# Patient Record
Sex: MALE | Race: WHITE | NOT HISPANIC OR LATINO | Employment: FULL TIME | URBAN - METROPOLITAN AREA
[De-identification: names, ages, dates, MRNs, and addresses within clinical notes are randomized per-mention and may not be internally consistent; named-entity substitution may affect disease eponyms.]

---

## 2017-09-19 ENCOUNTER — ALLSCRIPTS OFFICE VISIT (OUTPATIENT)
Dept: OTHER | Facility: OTHER | Age: 42
End: 2017-09-19

## 2017-09-19 ENCOUNTER — APPOINTMENT (OUTPATIENT)
Dept: LAB | Facility: CLINIC | Age: 42
End: 2017-09-19
Payer: COMMERCIAL

## 2017-09-19 ENCOUNTER — TRANSCRIBE ORDERS (OUTPATIENT)
Dept: LAB | Facility: CLINIC | Age: 42
End: 2017-09-19

## 2017-09-19 DIAGNOSIS — H53.9 VISUAL DISTURBANCE: ICD-10-CM

## 2017-09-19 DIAGNOSIS — Z00.00 ENCOUNTER FOR GENERAL ADULT MEDICAL EXAMINATION WITHOUT ABNORMAL FINDINGS: ICD-10-CM

## 2017-09-19 DIAGNOSIS — E66.01 MORBID (SEVERE) OBESITY DUE TO EXCESS CALORIES (HCC): ICD-10-CM

## 2017-09-19 DIAGNOSIS — R63.5 ABNORMAL WEIGHT GAIN: ICD-10-CM

## 2017-09-19 DIAGNOSIS — R74.8 ABNORMAL LEVELS OF OTHER SERUM ENZYMES: ICD-10-CM

## 2017-09-19 DIAGNOSIS — R73.9 HYPERGLYCEMIA: ICD-10-CM

## 2017-09-19 DIAGNOSIS — Z98.84 BARIATRIC SURGERY STATUS: ICD-10-CM

## 2017-09-19 DIAGNOSIS — Z13.220 ENCOUNTER FOR SCREENING FOR LIPOID DISORDERS: ICD-10-CM

## 2017-09-19 LAB
ALBUMIN SERPL BCP-MCNC: 3.8 G/DL (ref 3.5–5)
ALP SERPL-CCNC: 138 U/L (ref 46–116)
ALT SERPL W P-5'-P-CCNC: 33 U/L (ref 12–78)
ANION GAP SERPL CALCULATED.3IONS-SCNC: 7 MMOL/L (ref 4–13)
AST SERPL W P-5'-P-CCNC: 25 U/L (ref 5–45)
BILIRUB SERPL-MCNC: 0.7 MG/DL (ref 0.2–1)
BILIRUB UR QL STRIP: NEGATIVE
BUN SERPL-MCNC: 17 MG/DL (ref 5–25)
CALCIUM SERPL-MCNC: 8.9 MG/DL (ref 8.3–10.1)
CHLORIDE SERPL-SCNC: 107 MMOL/L (ref 100–108)
CHOLEST SERPL-MCNC: 144 MG/DL (ref 50–200)
CLARITY UR: CLEAR
CO2 SERPL-SCNC: 27 MMOL/L (ref 21–32)
COLOR UR: YELLOW
CREAT SERPL-MCNC: 0.88 MG/DL (ref 0.6–1.3)
CREAT UR-MCNC: 206 MG/DL
ERYTHROCYTE [DISTWIDTH] IN BLOOD BY AUTOMATED COUNT: 13.3 % (ref 11.6–15.1)
EST. AVERAGE GLUCOSE BLD GHB EST-MCNC: 137 MG/DL
GFR SERPL CREATININE-BSD FRML MDRD: 105 ML/MIN/1.73SQ M
GLUCOSE P FAST SERPL-MCNC: 131 MG/DL (ref 65–99)
GLUCOSE UR STRIP-MCNC: NEGATIVE MG/DL
HBA1C MFR BLD: 6.4 % (ref 4.2–6.3)
HCT VFR BLD AUTO: 46.2 % (ref 36.5–49.3)
HDLC SERPL-MCNC: 46 MG/DL (ref 40–60)
HGB BLD-MCNC: 15.4 G/DL (ref 12–17)
HGB UR QL STRIP.AUTO: NEGATIVE
KETONES UR STRIP-MCNC: NEGATIVE MG/DL
LDLC SERPL CALC-MCNC: 80 MG/DL (ref 0–100)
LEUKOCYTE ESTERASE UR QL STRIP: NEGATIVE
MCH RBC QN AUTO: 30 PG (ref 26.8–34.3)
MCHC RBC AUTO-ENTMCNC: 33.3 G/DL (ref 31.4–37.4)
MCV RBC AUTO: 90 FL (ref 82–98)
MICROALBUMIN UR-MCNC: 6.4 MG/L (ref 0–20)
MICROALBUMIN/CREAT 24H UR: 3 MG/G CREATININE (ref 0–30)
NITRITE UR QL STRIP: NEGATIVE
PH UR STRIP.AUTO: 6 [PH] (ref 4.5–8)
PLATELET # BLD AUTO: 250 THOUSANDS/UL (ref 149–390)
PMV BLD AUTO: 11.2 FL (ref 8.9–12.7)
POTASSIUM SERPL-SCNC: 4.3 MMOL/L (ref 3.5–5.3)
PROT SERPL-MCNC: 7 G/DL (ref 6.4–8.2)
PROT UR STRIP-MCNC: NEGATIVE MG/DL
RBC # BLD AUTO: 5.14 MILLION/UL (ref 3.88–5.62)
SODIUM SERPL-SCNC: 141 MMOL/L (ref 136–145)
SP GR UR STRIP.AUTO: 1.03 (ref 1–1.03)
TRIGL SERPL-MCNC: 91 MG/DL
TSH SERPL DL<=0.05 MIU/L-ACNC: 3.07 UIU/ML (ref 0.36–3.74)
UROBILINOGEN UR QL STRIP.AUTO: 1 E.U./DL
WBC # BLD AUTO: 6.81 THOUSAND/UL (ref 4.31–10.16)

## 2017-09-19 PROCEDURE — 36415 COLL VENOUS BLD VENIPUNCTURE: CPT

## 2017-09-19 PROCEDURE — 82043 UR ALBUMIN QUANTITATIVE: CPT

## 2017-09-19 PROCEDURE — 80053 COMPREHEN METABOLIC PANEL: CPT

## 2017-09-19 PROCEDURE — 81003 URINALYSIS AUTO W/O SCOPE: CPT

## 2017-09-19 PROCEDURE — 83036 HEMOGLOBIN GLYCOSYLATED A1C: CPT

## 2017-09-19 PROCEDURE — 85027 COMPLETE CBC AUTOMATED: CPT

## 2017-09-19 PROCEDURE — 80061 LIPID PANEL: CPT

## 2017-09-19 PROCEDURE — 82570 ASSAY OF URINE CREATININE: CPT

## 2017-09-19 PROCEDURE — 84443 ASSAY THYROID STIM HORMONE: CPT

## 2017-09-27 ENCOUNTER — GENERIC CONVERSION - ENCOUNTER (OUTPATIENT)
Dept: OTHER | Facility: OTHER | Age: 42
End: 2017-09-27

## 2017-09-28 ENCOUNTER — APPOINTMENT (OUTPATIENT)
Dept: LAB | Facility: CLINIC | Age: 42
End: 2017-09-28
Payer: COMMERCIAL

## 2017-09-28 PROCEDURE — 84075 ASSAY ALKALINE PHOSPHATASE: CPT

## 2017-09-28 PROCEDURE — 84080 ASSAY ALKALINE PHOSPHATASES: CPT

## 2017-09-28 PROCEDURE — 36415 COLL VENOUS BLD VENIPUNCTURE: CPT

## 2017-10-02 LAB
ALP BONE CFR SERPL: 53 % (ref 12–68)
ALP INTEST CFR SERPL: 3 % (ref 0–18)
ALP LIVER CFR SERPL: 44 % (ref 13–88)
ALP SERPL-CCNC: 144 IU/L (ref 39–117)

## 2018-01-14 VITALS
BODY MASS INDEX: 35.91 KG/M2 | HEIGHT: 71 IN | WEIGHT: 256.5 LBS | SYSTOLIC BLOOD PRESSURE: 110 MMHG | HEART RATE: 56 BPM | DIASTOLIC BLOOD PRESSURE: 72 MMHG

## 2018-01-22 VITALS
BODY MASS INDEX: 36.02 KG/M2 | WEIGHT: 258.25 LBS | DIASTOLIC BLOOD PRESSURE: 82 MMHG | HEART RATE: 60 BPM | SYSTOLIC BLOOD PRESSURE: 112 MMHG

## 2018-03-27 DIAGNOSIS — E66.01 MORBID (SEVERE) OBESITY DUE TO EXCESS CALORIES (HCC): ICD-10-CM

## 2018-03-27 DIAGNOSIS — R63.5 ABNORMAL WEIGHT GAIN: ICD-10-CM

## 2018-03-27 DIAGNOSIS — R73.9 HYPERGLYCEMIA: ICD-10-CM

## 2018-03-27 DIAGNOSIS — H60.90 OTITIS EXTERNA: ICD-10-CM

## 2018-03-27 DIAGNOSIS — H61.21 IMPACTED CERUMEN OF RIGHT EAR: ICD-10-CM

## 2018-03-27 DIAGNOSIS — Z98.84 BARIATRIC SURGERY STATUS: ICD-10-CM

## 2018-03-27 DIAGNOSIS — R74.8 ABNORMAL LEVELS OF OTHER SERUM ENZYMES: ICD-10-CM

## 2018-04-02 PROBLEM — R63.5 WEIGHT GAIN FOLLOWING GASTRIC BYPASS SURGERY: Status: ACTIVE | Noted: 2017-09-19

## 2018-04-02 PROBLEM — R74.8 ALKALINE PHOSPHATASE ELEVATION: Status: ACTIVE | Noted: 2017-09-27

## 2018-04-02 PROBLEM — E66.01 SEVERE OBESITY (BMI 35.0-39.9): Status: ACTIVE | Noted: 2017-09-19

## 2018-04-02 PROBLEM — Z98.84 WEIGHT GAIN FOLLOWING GASTRIC BYPASS SURGERY: Status: ACTIVE | Noted: 2017-09-19

## 2018-04-02 PROBLEM — R73.9 HYPERGLYCEMIA: Status: ACTIVE | Noted: 2017-09-19

## 2018-04-02 PROBLEM — H53.9 VISION CHANGES: Status: ACTIVE | Noted: 2017-09-19

## 2019-01-04 ENCOUNTER — OFFICE VISIT (OUTPATIENT)
Dept: FAMILY MEDICINE CLINIC | Facility: CLINIC | Age: 44
End: 2019-01-04
Payer: COMMERCIAL

## 2019-01-04 VITALS
DIASTOLIC BLOOD PRESSURE: 80 MMHG | SYSTOLIC BLOOD PRESSURE: 116 MMHG | HEART RATE: 60 BPM | BODY MASS INDEX: 35.56 KG/M2 | TEMPERATURE: 98.1 F | WEIGHT: 254 LBS | HEIGHT: 71 IN

## 2019-01-04 DIAGNOSIS — J01.00 ACUTE NON-RECURRENT MAXILLARY SINUSITIS: Primary | ICD-10-CM

## 2019-01-04 PROCEDURE — 1036F TOBACCO NON-USER: CPT | Performed by: PHYSICIAN ASSISTANT

## 2019-01-04 PROCEDURE — 99214 OFFICE O/P EST MOD 30 MIN: CPT | Performed by: PHYSICIAN ASSISTANT

## 2019-01-04 PROCEDURE — 3008F BODY MASS INDEX DOCD: CPT | Performed by: PHYSICIAN ASSISTANT

## 2019-01-04 RX ORDER — AZITHROMYCIN 250 MG/1
TABLET, FILM COATED ORAL
Qty: 6 TABLET | Refills: 0 | Status: SHIPPED | OUTPATIENT
Start: 2019-01-04 | End: 2019-01-09

## 2019-01-04 NOTE — PROGRESS NOTES
Assessment/Plan:  Patient Instructions   1  Acute sinusitis-recommended Zithromax Z-Felipe as directed  The patient was also advised to use Flonase, 2 sprays in each nostril daily  They may use over-the-counter NSAIDs such as ibuprofen as necessary for pressure  Follow-up in the next 4-5 days if not improved  Diagnoses and all orders for this visit:    Acute non-recurrent maxillary sinusitis  -     azithromycin (ZITHROMAX) 250 mg tablet; Take 2 Tabs by mouth today, then Take 1 tablet daily for 4 more days  Subjective:   Fever, sore throat, productive cough clear / yellow, body aches with cough  Symptoms started Monday  -  ls     Patient ID: Candelaria Bang is a 38 y o  male  HPI:  This is a 76-year-old gentleman that presents to the office with ongoing cough and sinus congestion  He has had a lot of head pressure and postnasal drip  He has had some yellow mucus production  He is feeling a bit fatigued and run down  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  He has been having some fevers which are mostly around  degrees F  The following portions of the patient's history were reviewed and updated as appropriate: allergies, current medications, past family history, past medical history, past social history, past surgical history and problem list     Review of Systems   Constitutional: Positive for activity change and fatigue  Negative for fever  HENT: Positive for congestion, postnasal drip, rhinorrhea, sinus pressure and sore throat  Negative for ear pain  Respiratory: Positive for cough  Negative for chest tightness, shortness of breath and wheezing  Cardiovascular: Negative for palpitations  Gastrointestinal: Negative for diarrhea and nausea  Musculoskeletal: Positive for myalgias  Negative for arthralgias  Skin: Negative for rash  Neurological: Negative for dizziness and numbness  All other systems reviewed and are negative  Objective:      /80 (BP Location: Left arm, Patient Position: Sitting, Cuff Size: Large)   Pulse 60   Temp 98 1 °F (36 7 °C) (Oral)   Ht 5' 11" (1 803 m)   Wt 115 kg (254 lb)   BMI 35 43 kg/m²          Physical Exam   Constitutional: He is oriented to person, place, and time  He appears well-developed and well-nourished  No distress  HENT:   Head: Normocephalic and atraumatic  Mouth/Throat: No oropharyngeal exudate  Turbinates are erythematous and edematous bilaterally  Post nasal drip of the posterior pharynx noted  Eyes: Pupils are equal, round, and reactive to light  Right eye exhibits no discharge  Left eye exhibits no discharge  Neck: Neck supple  Cardiovascular: Normal rate, regular rhythm and normal heart sounds  Exam reveals no friction rub  No murmur heard  Pulmonary/Chest: Effort normal and breath sounds normal  No respiratory distress  He has no wheezes  He has no rales  Musculoskeletal: Normal range of motion  He exhibits no edema  Lymphadenopathy:     He has cervical adenopathy  Neurological: He is alert and oriented to person, place, and time  Skin: Skin is warm and dry  No erythema  Psychiatric: He has a normal mood and affect  His behavior is normal    Nursing note and vitals reviewed

## 2019-01-04 NOTE — PATIENT INSTRUCTIONS
1   Acute sinusitis-recommended Zithromax Z-Felipe as directed  The patient was also advised to use Flonase, 2 sprays in each nostril daily  They may use over-the-counter NSAIDs such as ibuprofen as necessary for pressure  Follow-up in the next 4-5 days if not improved

## 2019-07-11 ENCOUNTER — OFFICE VISIT (OUTPATIENT)
Dept: FAMILY MEDICINE CLINIC | Facility: CLINIC | Age: 44
End: 2019-07-11
Payer: COMMERCIAL

## 2019-07-11 VITALS
HEIGHT: 72 IN | SYSTOLIC BLOOD PRESSURE: 102 MMHG | HEART RATE: 56 BPM | WEIGHT: 262 LBS | DIASTOLIC BLOOD PRESSURE: 64 MMHG | BODY MASS INDEX: 35.49 KG/M2

## 2019-07-11 DIAGNOSIS — E66.01 SEVERE OBESITY WITH BODY MASS INDEX (BMI) OF 35.0 TO 39.9 WITH SERIOUS COMORBIDITY (HCC): ICD-10-CM

## 2019-07-11 DIAGNOSIS — S91.332A PUNCTURE WOUND OF LEFT FOOT, INITIAL ENCOUNTER: Primary | ICD-10-CM

## 2019-07-11 DIAGNOSIS — R63.5 WEIGHT GAIN FOLLOWING GASTRIC BYPASS SURGERY: ICD-10-CM

## 2019-07-11 DIAGNOSIS — Z13.220 SCREENING, LIPID: ICD-10-CM

## 2019-07-11 DIAGNOSIS — R73.9 HYPERGLYCEMIA: ICD-10-CM

## 2019-07-11 DIAGNOSIS — R10.9 ABDOMINAL PAIN, UNSPECIFIED ABDOMINAL LOCATION: ICD-10-CM

## 2019-07-11 DIAGNOSIS — K80.20 ASYMPTOMATIC CHOLELITHIASIS: ICD-10-CM

## 2019-07-11 DIAGNOSIS — R74.8 ALKALINE PHOSPHATASE ELEVATION: ICD-10-CM

## 2019-07-11 DIAGNOSIS — Z98.84 WEIGHT GAIN FOLLOWING GASTRIC BYPASS SURGERY: ICD-10-CM

## 2019-07-11 PROCEDURE — 90715 TDAP VACCINE 7 YRS/> IM: CPT

## 2019-07-11 PROCEDURE — 90471 IMMUNIZATION ADMIN: CPT

## 2019-07-11 PROCEDURE — 99214 OFFICE O/P EST MOD 30 MIN: CPT | Performed by: FAMILY MEDICINE

## 2019-07-11 NOTE — PROGRESS NOTES
Assessment and Plan:   1  Abdominal pain /  Asymptomatic cholelithiasis in past   Check blood work and ultrasound  2  Hyperglycemia borderline diabetic blood work is ordered  3  Alkaline phosphatase elevation blood work is ordered  4  Weight gain status post gastric bypass /very obesity diet exercise and weight loss recommended BMI of 35 53 discussed   5  Puncture wound left foot healing well  Signs of infection discussed  Adacel given today  6  Return in 2 weeks sooner if needed      Problem List Items Addressed This Visit        Digestive    Asymptomatic cholelithiasis      Blood work and ultrasound are ordered            Other    Alkaline phosphatase elevation    Relevant Orders    CBC    Comprehensive metabolic panel    Hemoglobin A1C    Lipid Panel with Direct LDL reflex    Microalbumin / creatinine urine ratio    Urinalysis with reflex to microscopic    Alkaline phosphatase, isoenzymes    Amylase    Lipase    Hyperglycemia    Relevant Orders    CBC    Comprehensive metabolic panel    Hemoglobin A1C    Lipid Panel with Direct LDL reflex    Microalbumin / creatinine urine ratio    Urinalysis with reflex to microscopic    Alkaline phosphatase, isoenzymes    Amylase    Lipase    Severe obesity (BMI 35 0-39  9)    Relevant Orders    CBC    Comprehensive metabolic panel    Hemoglobin A1C    Lipid Panel with Direct LDL reflex    Microalbumin / creatinine urine ratio    Urinalysis with reflex to microscopic    Alkaline phosphatase, isoenzymes    Amylase    Lipase    Weight gain following gastric bypass surgery    Relevant Orders    CBC    Comprehensive metabolic panel    Hemoglobin A1C    Lipid Panel with Direct LDL reflex    Microalbumin / creatinine urine ratio    Urinalysis with reflex to microscopic    Alkaline phosphatase, isoenzymes    Amylase    Lipase    Abdominal pain      Blood work and ultrasound ordered         Relevant Orders    CBC    Comprehensive metabolic panel    Hemoglobin A1C    Lipid Panel with Direct LDL reflex    Microalbumin / creatinine urine ratio    Urinalysis with reflex to microscopic    Alkaline phosphatase, isoenzymes    Amylase    Lipase    US abdomen complete      Other Visit Diagnoses     Puncture wound of left foot, initial encounter    -  Primary    Relevant Orders    CBC    Comprehensive metabolic panel    Hemoglobin A1C    Lipid Panel with Direct LDL reflex    Microalbumin / creatinine urine ratio    Urinalysis with reflex to microscopic    Alkaline phosphatase, isoenzymes    Amylase    Lipase    Screening, lipid        Relevant Orders    CBC    Comprehensive metabolic panel    Hemoglobin A1C    Lipid Panel with Direct LDL reflex    Microalbumin / creatinine urine ratio    Urinalysis with reflex to microscopic    Alkaline phosphatase, isoenzymes    Amylase    Lipase                 Diagnoses and all orders for this visit:    Puncture wound of left foot, initial encounter  -     CBC; Future  -     Comprehensive metabolic panel; Future  -     Hemoglobin A1C; Future  -     Lipid Panel with Direct LDL reflex; Future  -     Microalbumin / creatinine urine ratio; Future  -     Urinalysis with reflex to microscopic; Future  -     Alkaline phosphatase, isoenzymes; Future  -     Amylase; Future  -     Lipase; Future    Hyperglycemia  -     CBC; Future  -     Comprehensive metabolic panel; Future  -     Hemoglobin A1C; Future  -     Lipid Panel with Direct LDL reflex; Future  -     Microalbumin / creatinine urine ratio; Future  -     Urinalysis with reflex to microscopic; Future  -     Alkaline phosphatase, isoenzymes; Future  -     Amylase; Future  -     Lipase; Future    Alkaline phosphatase elevation  -     CBC; Future  -     Comprehensive metabolic panel; Future  -     Hemoglobin A1C; Future  -     Lipid Panel with Direct LDL reflex; Future  -     Microalbumin / creatinine urine ratio; Future  -     Urinalysis with reflex to microscopic; Future  -     Alkaline phosphatase, isoenzymes;  Future  - Amylase; Future  -     Lipase; Future    Severe obesity (BMI 35 0-39 9)  -     CBC; Future  -     Comprehensive metabolic panel; Future  -     Hemoglobin A1C; Future  -     Lipid Panel with Direct LDL reflex; Future  -     Microalbumin / creatinine urine ratio; Future  -     Urinalysis with reflex to microscopic; Future  -     Alkaline phosphatase, isoenzymes; Future  -     Amylase; Future  -     Lipase; Future    Weight gain following gastric bypass surgery  -     CBC; Future  -     Comprehensive metabolic panel; Future  -     Hemoglobin A1C; Future  -     Lipid Panel with Direct LDL reflex; Future  -     Microalbumin / creatinine urine ratio; Future  -     Urinalysis with reflex to microscopic; Future  -     Alkaline phosphatase, isoenzymes; Future  -     Amylase; Future  -     Lipase; Future    Screening, lipid  -     CBC; Future  -     Comprehensive metabolic panel; Future  -     Hemoglobin A1C; Future  -     Lipid Panel with Direct LDL reflex; Future  -     Microalbumin / creatinine urine ratio; Future  -     Urinalysis with reflex to microscopic; Future  -     Alkaline phosphatase, isoenzymes; Future  -     Amylase; Future  -     Lipase; Future    Abdominal pain, unspecified abdominal location  -     CBC; Future  -     Comprehensive metabolic panel; Future  -     Hemoglobin A1C; Future  -     Lipid Panel with Direct LDL reflex; Future  -     Microalbumin / creatinine urine ratio; Future  -     Urinalysis with reflex to microscopic; Future  -     Alkaline phosphatase, isoenzymes; Future  -     Amylase; Future  -     Lipase; Future  -     US abdomen complete; Future    Asymptomatic cholelithiasis              Subjective:      Patient ID: Ethan Covarrubias is a 38 y o  male  CC:    Chief Complaint   Patient presents with    Follow-up     patient is here for a yearly check up  ak       HPI:     Patient is here for follow-up  Patient is overdue for blood work  Patient gained 8 lb since last office visit  Yesterday patient found a small piece of glasses of foot he did remove it  Patient is overdue for tetanus immunizations  Patient states by once a week he gets upper abdominal crampy type pain  No nausea vomiting constipation diarrhea no melena change in stools      The following portions of the patient's history were reviewed and updated as appropriate: allergies, current medications, past family history, past medical history, past social history, past surgical history and problem list       Review of Systems   Constitutional: Positive for unexpected weight change  HENT: Negative  Eyes: Negative  Respiratory: Negative  Cardiovascular: Negative  Gastrointestinal: Negative  Endocrine:          Patient's last hemoglobin A1c is 6 4/borderline diabetic   Genitourinary: Negative  Musculoskeletal: Negative  Skin:         HPI   Allergic/Immunologic: Negative  Neurological: Negative  Hematological: Negative  Psychiatric/Behavioral: Negative  Data to review:       Objective:    Vitals:    07/11/19 1141   BP: 102/64   Pulse: 56   Weight: 119 kg (262 lb)   Height: 6' (1 829 m)        Physical Exam   Constitutional: He is oriented to person, place, and time  He appears well-developed and well-nourished  HENT:   Head: Normocephalic and atraumatic  Right Ear: External ear normal    Left Ear: External ear normal    Nose: Nose normal    Mouth/Throat: Oropharynx is clear and moist  No oropharyngeal exudate  Eyes: Pupils are equal, round, and reactive to light  Conjunctivae and EOM are normal  No scleral icterus  Neck: Neck supple  No JVD present  No tracheal deviation present  No thyromegaly present  Cardiovascular: Normal rate, regular rhythm and normal heart sounds  Pulmonary/Chest: Effort normal and breath sounds normal    Abdominal: Soft  Bowel sounds are normal  He exhibits no distension and no mass  There is no tenderness  There is no rebound and no guarding  No hernia  Musculoskeletal: He exhibits no edema  Lymphadenopathy:     He has no cervical adenopathy  Neurological: He is alert and oriented to person, place, and time  No cranial nerve deficit  Skin: Skin is warm and dry  Left foot just proximal to 1st MTP joint with small puncture wound no purulent discharge no tenderness   Psychiatric: He has a normal mood and affect  BMI Counseling: Body mass index is 35 53 kg/m²  Discussed the patient's BMI with him  The BMI is above average  BMI counseling and education was provided to the patient  Nutrition recommendations include moderation in carbohydrate intake  Exercise recommendations include exercising 3-5 times per week

## 2019-07-13 ENCOUNTER — APPOINTMENT (OUTPATIENT)
Dept: LAB | Facility: MEDICAL CENTER | Age: 44
End: 2019-07-13
Payer: COMMERCIAL

## 2019-07-13 DIAGNOSIS — Z98.84 WEIGHT GAIN FOLLOWING GASTRIC BYPASS SURGERY: ICD-10-CM

## 2019-07-13 DIAGNOSIS — R63.5 WEIGHT GAIN FOLLOWING GASTRIC BYPASS SURGERY: ICD-10-CM

## 2019-07-13 DIAGNOSIS — Z13.220 SCREENING, LIPID: ICD-10-CM

## 2019-07-13 DIAGNOSIS — R74.8 ALKALINE PHOSPHATASE ELEVATION: ICD-10-CM

## 2019-07-13 DIAGNOSIS — R10.9 ABDOMINAL PAIN, UNSPECIFIED ABDOMINAL LOCATION: ICD-10-CM

## 2019-07-13 DIAGNOSIS — R73.9 HYPERGLYCEMIA: ICD-10-CM

## 2019-07-13 DIAGNOSIS — E66.01 SEVERE OBESITY WITH BODY MASS INDEX (BMI) OF 35.0 TO 39.9 WITH SERIOUS COMORBIDITY (HCC): ICD-10-CM

## 2019-07-13 DIAGNOSIS — S91.332A PUNCTURE WOUND OF LEFT FOOT, INITIAL ENCOUNTER: ICD-10-CM

## 2019-07-13 LAB
ALBUMIN SERPL BCP-MCNC: 3.8 G/DL (ref 3.5–5)
ALP SERPL-CCNC: 133 U/L (ref 46–116)
ALT SERPL W P-5'-P-CCNC: 26 U/L (ref 12–78)
AMYLASE SERPL-CCNC: 48 IU/L (ref 25–115)
ANION GAP SERPL CALCULATED.3IONS-SCNC: 4 MMOL/L (ref 4–13)
AST SERPL W P-5'-P-CCNC: 18 U/L (ref 5–45)
BILIRUB SERPL-MCNC: 0.9 MG/DL (ref 0.2–1)
BILIRUB UR QL STRIP: NEGATIVE
BUN SERPL-MCNC: 15 MG/DL (ref 5–25)
CALCIUM SERPL-MCNC: 8.6 MG/DL (ref 8.3–10.1)
CHLORIDE SERPL-SCNC: 109 MMOL/L (ref 100–108)
CHOLEST SERPL-MCNC: 135 MG/DL (ref 50–200)
CLARITY UR: CLEAR
CO2 SERPL-SCNC: 25 MMOL/L (ref 21–32)
COLOR UR: YELLOW
CREAT SERPL-MCNC: 0.88 MG/DL (ref 0.6–1.3)
CREAT UR-MCNC: 193 MG/DL
ERYTHROCYTE [DISTWIDTH] IN BLOOD BY AUTOMATED COUNT: 13 % (ref 11.6–15.1)
EST. AVERAGE GLUCOSE BLD GHB EST-MCNC: 148 MG/DL
GFR SERPL CREATININE-BSD FRML MDRD: 104 ML/MIN/1.73SQ M
GLUCOSE P FAST SERPL-MCNC: 130 MG/DL (ref 65–99)
GLUCOSE UR STRIP-MCNC: NEGATIVE MG/DL
HBA1C MFR BLD: 6.8 % (ref 4.2–6.3)
HCT VFR BLD AUTO: 45.8 % (ref 36.5–49.3)
HDLC SERPL-MCNC: 40 MG/DL (ref 40–60)
HGB BLD-MCNC: 15.2 G/DL (ref 12–17)
HGB UR QL STRIP.AUTO: NEGATIVE
KETONES UR STRIP-MCNC: NEGATIVE MG/DL
LDLC SERPL CALC-MCNC: 76 MG/DL (ref 0–100)
LEUKOCYTE ESTERASE UR QL STRIP: NEGATIVE
LIPASE SERPL-CCNC: 112 U/L (ref 73–393)
MCH RBC QN AUTO: 30.2 PG (ref 26.8–34.3)
MCHC RBC AUTO-ENTMCNC: 33.2 G/DL (ref 31.4–37.4)
MCV RBC AUTO: 91 FL (ref 82–98)
MICROALBUMIN UR-MCNC: 6.9 MG/L (ref 0–20)
MICROALBUMIN/CREAT 24H UR: 4 MG/G CREATININE (ref 0–30)
NITRITE UR QL STRIP: NEGATIVE
PH UR STRIP.AUTO: 6 [PH]
PLATELET # BLD AUTO: 235 THOUSANDS/UL (ref 149–390)
PMV BLD AUTO: 10.8 FL (ref 8.9–12.7)
POTASSIUM SERPL-SCNC: 4 MMOL/L (ref 3.5–5.3)
PROT SERPL-MCNC: 6.9 G/DL (ref 6.4–8.2)
PROT UR STRIP-MCNC: NEGATIVE MG/DL
RBC # BLD AUTO: 5.03 MILLION/UL (ref 3.88–5.62)
SODIUM SERPL-SCNC: 138 MMOL/L (ref 136–145)
SP GR UR STRIP.AUTO: 1.02 (ref 1–1.03)
TRIGL SERPL-MCNC: 93 MG/DL
UROBILINOGEN UR QL STRIP.AUTO: 1 E.U./DL
WBC # BLD AUTO: 6.65 THOUSAND/UL (ref 4.31–10.16)

## 2019-07-13 PROCEDURE — 82043 UR ALBUMIN QUANTITATIVE: CPT

## 2019-07-13 PROCEDURE — 84080 ASSAY ALKALINE PHOSPHATASES: CPT

## 2019-07-13 PROCEDURE — 3061F NEG MICROALBUMINURIA REV: CPT | Performed by: FAMILY MEDICINE

## 2019-07-13 PROCEDURE — 83036 HEMOGLOBIN GLYCOSYLATED A1C: CPT

## 2019-07-13 PROCEDURE — 80061 LIPID PANEL: CPT

## 2019-07-13 PROCEDURE — 85027 COMPLETE CBC AUTOMATED: CPT

## 2019-07-13 PROCEDURE — 84075 ASSAY ALKALINE PHOSPHATASE: CPT

## 2019-07-13 PROCEDURE — 36415 COLL VENOUS BLD VENIPUNCTURE: CPT

## 2019-07-13 PROCEDURE — 80053 COMPREHEN METABOLIC PANEL: CPT

## 2019-07-13 PROCEDURE — 82570 ASSAY OF URINE CREATININE: CPT

## 2019-07-13 PROCEDURE — 83690 ASSAY OF LIPASE: CPT

## 2019-07-13 PROCEDURE — 82150 ASSAY OF AMYLASE: CPT

## 2019-07-13 PROCEDURE — 81003 URINALYSIS AUTO W/O SCOPE: CPT

## 2019-07-16 LAB
ALP BONE CFR SERPL: 48 % (ref 12–68)
ALP INTEST CFR SERPL: 3 % (ref 0–18)
ALP LIVER CFR SERPL: 49 % (ref 13–88)
ALP SERPL-CCNC: 123 IU/L (ref 39–117)

## 2019-07-18 ENCOUNTER — HOSPITAL ENCOUNTER (OUTPATIENT)
Dept: ULTRASOUND IMAGING | Facility: HOSPITAL | Age: 44
Discharge: HOME | End: 2019-07-18
Payer: COMMERCIAL

## 2019-07-18 DIAGNOSIS — R10.9 ABDOMINAL PAIN, UNSPECIFIED ABDOMINAL LOCATION: ICD-10-CM

## 2019-07-18 PROCEDURE — 76700 US EXAM ABDOM COMPLETE: CPT

## 2019-07-25 ENCOUNTER — OFFICE VISIT (OUTPATIENT)
Dept: FAMILY MEDICINE CLINIC | Facility: CLINIC | Age: 44
End: 2019-07-25
Payer: COMMERCIAL

## 2019-07-25 VITALS
HEART RATE: 56 BPM | DIASTOLIC BLOOD PRESSURE: 76 MMHG | HEIGHT: 72 IN | BODY MASS INDEX: 34.95 KG/M2 | WEIGHT: 258 LBS | SYSTOLIC BLOOD PRESSURE: 108 MMHG

## 2019-07-25 DIAGNOSIS — Z98.84 WEIGHT GAIN FOLLOWING GASTRIC BYPASS SURGERY: ICD-10-CM

## 2019-07-25 DIAGNOSIS — E11.9 TYPE 2 DIABETES MELLITUS WITHOUT COMPLICATION, WITHOUT LONG-TERM CURRENT USE OF INSULIN (HCC): Primary | ICD-10-CM

## 2019-07-25 DIAGNOSIS — R74.8 ALKALINE PHOSPHATASE ELEVATION: ICD-10-CM

## 2019-07-25 DIAGNOSIS — R10.9 ABDOMINAL PAIN, UNSPECIFIED ABDOMINAL LOCATION: ICD-10-CM

## 2019-07-25 DIAGNOSIS — E66.9 OBESITY (BMI 30-39.9): ICD-10-CM

## 2019-07-25 DIAGNOSIS — R63.5 WEIGHT GAIN FOLLOWING GASTRIC BYPASS SURGERY: ICD-10-CM

## 2019-07-25 DIAGNOSIS — K80.20 CALCULUS OF GALLBLADDER WITHOUT CHOLECYSTITIS WITHOUT OBSTRUCTION: ICD-10-CM

## 2019-07-25 PROBLEM — H53.9 VISION CHANGES: Status: RESOLVED | Noted: 2017-09-19 | Resolved: 2019-07-25

## 2019-07-25 PROBLEM — R73.9 HYPERGLYCEMIA: Status: RESOLVED | Noted: 2017-09-19 | Resolved: 2019-07-25

## 2019-07-25 PROBLEM — E66.01 SEVERE OBESITY WITH BODY MASS INDEX (BMI) OF 35.0 TO 39.9 WITH SERIOUS COMORBIDITY (HCC): Status: RESOLVED | Noted: 2017-09-19 | Resolved: 2019-07-25

## 2019-07-25 LAB
LEFT EYE DIABETIC RETINOPATHY: ABNORMAL
LEFT EYE IMAGE QUALITY: ABNORMAL
LEFT EYE MACULAR EDEMA: ABNORMAL
LEFT EYE OTHER RETINOPATHY: ABNORMAL
RIGHT EYE DIABETIC RETINOPATHY: ABNORMAL
RIGHT EYE IMAGE QUALITY: ABNORMAL
RIGHT EYE MACULAR EDEMA: ABNORMAL
RIGHT EYE OTHER RETINOPATHY: ABNORMAL
SEVERITY (EYE EXAM): ABNORMAL

## 2019-07-25 PROCEDURE — 2022F DILAT RTA XM EVC RTNOPTHY: CPT | Performed by: PHYSICIAN ASSISTANT

## 2019-07-25 PROCEDURE — 99214 OFFICE O/P EST MOD 30 MIN: CPT | Performed by: FAMILY MEDICINE

## 2019-07-25 PROCEDURE — 3008F BODY MASS INDEX DOCD: CPT | Performed by: FAMILY MEDICINE

## 2019-07-25 PROCEDURE — 92250 FUNDUS PHOTOGRAPHY W/I&R: CPT | Performed by: FAMILY MEDICINE

## 2019-07-25 RX ORDER — LANCETS
EACH MISCELLANEOUS
Qty: 100 EACH | Refills: 3 | Status: SHIPPED | OUTPATIENT
Start: 2019-07-25 | End: 2021-04-01

## 2019-07-25 RX ORDER — METFORMIN HYDROCHLORIDE 500 MG/1
500 TABLET, EXTENDED RELEASE ORAL
Qty: 30 TABLET | Refills: 5 | Status: SHIPPED | OUTPATIENT
Start: 2019-07-25 | End: 2020-01-14 | Stop reason: SDUPTHER

## 2019-07-25 RX ORDER — BLOOD-GLUCOSE METER
EACH MISCELLANEOUS DAILY
Qty: 1 EACH | Refills: 0 | Status: SHIPPED | OUTPATIENT
Start: 2019-07-25 | End: 2021-04-01

## 2019-07-25 NOTE — PROGRESS NOTES
Assessment and Plan:  1  Type 2 diabetes, diabetic foot and iris exams completed in office today metformin was prescribed  Glucometer was prescribed  Patient was a type 2 insulin-dependent before his gastric bypass over 10 years ago did declines diabetic education  2  Cholelithiasis /abdominal pain will consult General surgery for evaluation  3  Alkaline phosphatase elevation, normal isoenzymes  4  Obesity /status post gastric bypass  Diet exercise weight loss recommended  5  Patient to return in 3 months for office visit blood work sooner if needed  Problem List Items Addressed This Visit        Digestive    Cholelithiasis    Relevant Orders    Comprehensive metabolic panel    Hemoglobin A1C    Microalbumin / creatinine urine ratio    Lipid Panel with Direct LDL reflex    Ambulatory referral to General Surgery       Endocrine    Type 2 diabetes mellitus without complication, without long-term current use of insulin ([facility]) - Primary     Lab Results   Component Value Date    HGBA1C 6 8 (H) 07/13/2019    hemoglobin A1c and fasting sugars now in the diabetic range  Will start metformin  Patient was diabetic before his gastric bypass  Does not wish to go for diabetic training  Will start metformin 500 mg extended release daily  Diabetic foot not exams completed in office today    No results for input(s): POCGLU in the last 72 hours      Blood Sugar Average: Last 72 hrs:           Relevant Medications    Lancets (ONETOUCH ULTRASOFT) lancets    glucose blood (ONE TOUCH ULTRA TEST) test strip    Blood Glucose Monitoring Suppl (ONE TOUCH ULTRA 2) w/Device KIT    Other Relevant Orders    IRIS Diabetic eye exam    Comprehensive metabolic panel    Hemoglobin A1C    Microalbumin / creatinine urine ratio    Lipid Panel with Direct LDL reflex       Other    Alkaline phosphatase elevation    Relevant Orders    Comprehensive metabolic panel    Hemoglobin A1C    Microalbumin / creatinine urine ratio    Lipid Panel with Direct LDL reflex    Weight gain following gastric bypass surgery    Relevant Orders    Comprehensive metabolic panel    Hemoglobin A1C    Microalbumin / creatinine urine ratio    Lipid Panel with Direct LDL reflex    Abdominal pain    Relevant Orders    Comprehensive metabolic panel    Hemoglobin A1C    Microalbumin / creatinine urine ratio    Lipid Panel with Direct LDL reflex    Ambulatory referral to General Surgery    Obesity (BMI 30-39  9)      Diet exercise weight loss recommended         Relevant Orders    Comprehensive metabolic panel    Hemoglobin A1C    Microalbumin / creatinine urine ratio    Lipid Panel with Direct LDL reflex                 Diagnoses and all orders for this visit:    Type 2 diabetes mellitus without complication, without long-term current use of insulin ([facility])  -     IRIS Diabetic eye exam  -     Comprehensive metabolic panel; Future  -     Hemoglobin A1C; Future  -     Microalbumin / creatinine urine ratio; Future  -     Lipid Panel with Direct LDL reflex; Future  -     Lancets (ONETOUCH ULTRASOFT) lancets; Use as instructed daily  -     glucose blood (ONE TOUCH ULTRA TEST) test strip; Use as instructed daily  -     Blood Glucose Monitoring Suppl (ONE TOUCH ULTRA 2) w/Device KIT; by Does not apply route daily    Calculus of gallbladder without cholecystitis without obstruction  -     Comprehensive metabolic panel; Future  -     Hemoglobin A1C; Future  -     Microalbumin / creatinine urine ratio; Future  -     Lipid Panel with Direct LDL reflex; Future  -     Ambulatory referral to General Surgery; Future    Abdominal pain, unspecified abdominal location  -     Comprehensive metabolic panel; Future  -     Hemoglobin A1C; Future  -     Microalbumin / creatinine urine ratio; Future  -     Lipid Panel with Direct LDL reflex; Future  -     Ambulatory referral to General Surgery; Future    Alkaline phosphatase elevation  -     Comprehensive metabolic panel;  Future  -     Hemoglobin A1C; Future  -     Microalbumin / creatinine urine ratio; Future  -     Lipid Panel with Direct LDL reflex; Future    Weight gain following gastric bypass surgery  -     Comprehensive metabolic panel; Future  -     Hemoglobin A1C; Future  -     Microalbumin / creatinine urine ratio; Future  -     Lipid Panel with Direct LDL reflex; Future    Obesity (BMI 30-39 9)  -     Comprehensive metabolic panel; Future  -     Hemoglobin A1C; Future  -     Microalbumin / creatinine urine ratio; Future  -     Lipid Panel with Direct LDL reflex; Future              Subjective:      Patient ID: Naomi Fernández is a 38 y o  male  CC:    Chief Complaint   Patient presents with    Follow-up     patient is here for a f/u to review U/S and blood work results  Patient still has intermittent abdominal pain and occasional loose stools  ak       HPI:     Patient's abdominal pain/ discomfort is not better not worse  Ultrasound was discussed  Question if cholelithiasis is the cause  Will refer to General surgery for consultation  Blood work was discussed  Patient is now diabetic with a hemoglobin A1c of 6 8 and fasting 130  Of note patient was insulin dependent diabetic before his gastric bypass  Over 10 years ago      The following portions of the patient's history were reviewed and updated as appropriate: allergies, current medications, past family history, past medical history, past social history, past surgical history and problem list       Review of Systems   Constitutional: Negative  HENT: Negative  Eyes: Negative  Respiratory: Negative  Cardiovascular: Negative  Gastrointestinal:         HPI   Endocrine:         HPI   Genitourinary: Negative  Musculoskeletal: Negative  Skin: Negative  Allergic/Immunologic: Negative  Neurological: Negative  Hematological: Negative  Psychiatric/Behavioral: Negative            Data to review:       Objective:    Vitals:    07/25/19 1113   BP: 108/76   Pulse: 56 Weight: 117 kg (258 lb)   Height: 6' (1 829 m)        Physical Exam   Constitutional: He is oriented to person, place, and time  He appears well-developed and well-nourished  HENT:   Head: Normocephalic and atraumatic  Mouth/Throat: Oropharynx is clear and moist    Eyes: Pupils are equal, round, and reactive to light  Conjunctivae and EOM are normal  No scleral icterus  Neck: Neck supple  No JVD present  Cardiovascular: Normal rate, regular rhythm, normal heart sounds and intact distal pulses  Pulses:       Dorsalis pedis pulses are 2+ on the right side, and 2+ on the left side  Posterior tibial pulses are 2+ on the right side, and 2+ on the left side  Pulmonary/Chest: Effort normal and breath sounds normal    Abdominal: Soft  Bowel sounds are normal  He exhibits no distension and no mass  There is no tenderness  There is no rebound and no guarding  No hernia  Musculoskeletal: He exhibits no edema  Feet:   Right Foot:   Skin Integrity: Negative for ulcer, skin breakdown, erythema, warmth, callus or dry skin  Left Foot:   Skin Integrity: Negative for ulcer, skin breakdown, erythema, warmth, callus or dry skin  Neurological: He is alert and oriented to person, place, and time  No cranial nerve deficit  Skin: Skin is warm and dry  Psychiatric: He has a normal mood and affect  Patient's shoes and socks removed  Right Foot/Ankle   Right Foot Inspection  Skin Exam: skin normal and skin intact no dry skin, no warmth, no callus, no erythema, no maceration, no abnormal color, no pre-ulcer, no ulcer and no callus                          Toe Exam: ROM and strength within normal limits  Sensory   Vibration: intact  Proprioception: intact   Monofilament testing: intact  Vascular  Capillary refills: < 3 seconds  The right DP pulse is 2+  The right PT pulse is 2+     Right Toe  - Comprehensive Exam  Arch: normal  Hammertoes: absent  Claw Toes: absent  Swelling: none   Tenderness: none Left Foot/Ankle  Left Foot Inspection  Skin Exam: skin normal and skin intactno dry skin, no warmth, no erythema, no maceration, normal color, no pre-ulcer, no ulcer and no callus                         Toe Exam: ROM and strength within normal limits                   Sensory   Vibration: intact  Proprioception: intact  Monofilament: intact  Vascular  Capillary refills: < 3 seconds  The left DP pulse is 2+  The left PT pulse is 2+  Left Toe  - Comprehensive Exam  Arch: normal  Hammertoes: absent  Claw toes: absent  Swelling: none   Tenderness: none       Assign Risk Category:  No deformity present; ;        Risk: 0      BMI Counseling: Body mass index is 34 99 kg/m²  Discussed the patient's BMI with him  The BMI is above average  BMI counseling and education was provided to the patient  Nutrition recommendations include moderation in carbohydrate intake

## 2019-07-25 NOTE — ASSESSMENT & PLAN NOTE
Lab Results   Component Value Date    HGBA1C 6 8 (H) 07/13/2019    hemoglobin A1c and fasting sugars now in the diabetic range  Will start metformin  Patient was diabetic before his gastric bypass  Does not wish to go for diabetic training  Will start metformin 500 mg extended release daily  Diabetic foot not exams completed in office today    No results for input(s): POCGLU in the last 72 hours      Blood Sugar Average: Last 72 hrs:

## 2019-07-25 NOTE — PATIENT INSTRUCTIONS
Follow low-fat low-carbohydrate low sugar diet  Check blood sugars daily  Start metformin daily  Return in 3 months for office visit blood work    Follow-up with General surgery for evaluation of abdominal pain and gallstone

## 2019-08-07 ENCOUNTER — CONSULT (OUTPATIENT)
Dept: SURGERY | Facility: MEDICAL CENTER | Age: 44
End: 2019-08-07
Payer: COMMERCIAL

## 2019-08-07 VITALS
WEIGHT: 256.2 LBS | BODY MASS INDEX: 34.7 KG/M2 | RESPIRATION RATE: 16 BRPM | HEART RATE: 68 BPM | DIASTOLIC BLOOD PRESSURE: 70 MMHG | SYSTOLIC BLOOD PRESSURE: 102 MMHG | TEMPERATURE: 97.3 F | HEIGHT: 72 IN

## 2019-08-07 DIAGNOSIS — K80.20 CALCULUS OF GALLBLADDER WITHOUT CHOLECYSTITIS WITHOUT OBSTRUCTION: ICD-10-CM

## 2019-08-07 DIAGNOSIS — R10.9 ABDOMINAL PAIN, UNSPECIFIED ABDOMINAL LOCATION: ICD-10-CM

## 2019-08-07 DIAGNOSIS — E11.9 TYPE 2 DIABETES MELLITUS WITHOUT COMPLICATION, WITHOUT LONG-TERM CURRENT USE OF INSULIN (HCC): ICD-10-CM

## 2019-08-07 DIAGNOSIS — E66.9 OBESITY (BMI 30-39.9): Primary | ICD-10-CM

## 2019-08-07 PROCEDURE — 99203 OFFICE O/P NEW LOW 30 MIN: CPT | Performed by: SURGERY

## 2019-08-07 NOTE — PROGRESS NOTES
Assessment/Plan:     Cholelithiasis  -  Symptomatic     discussed with him the options which included laparoscopic cholecystectomy versus conservative management  Explained that  Because he is apparently symptomatic from his gallstones it is definitely indicated to consider cholecystectomy  However he would require 1 month off work in order to undergo surgical removal   This point he does not feel that his symptoms with that concerning and also he is unable to find that much time off work  He states he will talk to his working see if there is an opportune time to proceed with surgery  In addition we discussed that if his pain increased in severity and does not resolve with time or he develops a fever he needs to call for evaluation  Diagnoses and all orders for this visit:    Obesity (BMI 30-39  9)    Calculus of gallbladder without cholecystitis without obstruction  -     Ambulatory referral to General Surgery    Abdominal pain, unspecified abdominal location  -     Ambulatory referral to General Surgery    Type 2 diabetes mellitus without complication, without long-term current use of insulin (HCC)          Subjective:      Patient ID: Juris Severance is a 38 y o  male  Mr Steve Mercado  Is a 65-year-old gentleman that is here for evaluation of intermittent abdominal pain and cholelithiasis  He has history of a laparoscopic Quynh-en-Y gastric bypass in 2008  At that time he was told he had gallstones  Since then he has episodes of epigastric right-sided abdominal pain postprandial that last about 20 minutes  These are intermittent and can be several weeks or months between attacks  He states  Their related to eating fatty or greasy foods  He was sent for repeat ultrasound for re-evaluation and is here for discussion    He denies fevers or chills      The following portions of the patient's history were reviewed and updated as appropriate: allergies, current medications, past family history, past medical history, past social history, past surgical history and problem list     Review of Systems   Constitutional: Negative for chills, fever and unexpected weight change  HENT: Negative for congestion, sore throat and trouble swallowing  Eyes: Negative for discharge and itching  Respiratory: Negative for cough, shortness of breath and wheezing  Cardiovascular: Negative for chest pain and leg swelling  Gastrointestinal: Positive for abdominal pain  Negative for abdominal distention  Endocrine: Negative for cold intolerance and heat intolerance  Genitourinary: Negative for difficulty urinating, dysuria and frequency  Musculoskeletal: Negative for arthralgias, gait problem and joint swelling  Skin: Negative for color change and wound  Neurological: Negative for dizziness, numbness and headaches  Psychiatric/Behavioral: Negative for agitation and confusion  The patient is not nervous/anxious  Objective:      /70   Pulse 68   Temp (!) 97 3 °F (36 3 °C) (Tympanic)   Resp 16   Ht 6' (1 829 m)   Wt 116 kg (256 lb 3 2 oz)   BMI 34 75 kg/m²          Physical Exam   Constitutional: He is oriented to person, place, and time  He appears well-developed  He is cooperative  HENT:   Head: Normocephalic and atraumatic  Eyes: Pupils are equal, round, and reactive to light  Conjunctivae and EOM are normal    Neck: Trachea normal  Neck supple  Cardiovascular: Regular rhythm and normal heart sounds  Pulmonary/Chest: Effort normal and breath sounds normal    Abdominal: Soft  Bowel sounds are normal  He exhibits no distension  There is no tenderness  Musculoskeletal: Normal range of motion  Neurological: He is alert and oriented to person, place, and time  Skin: Skin is warm and dry  Psychiatric: He has a normal mood and affect  His behavior is normal    Nursing note and vitals reviewed

## 2020-01-10 ENCOUNTER — APPOINTMENT (OUTPATIENT)
Dept: LAB | Facility: CLINIC | Age: 45
End: 2020-01-10
Payer: COMMERCIAL

## 2020-01-10 DIAGNOSIS — R10.9 ABDOMINAL PAIN, UNSPECIFIED ABDOMINAL LOCATION: ICD-10-CM

## 2020-01-10 DIAGNOSIS — Z98.84 WEIGHT GAIN FOLLOWING GASTRIC BYPASS SURGERY: ICD-10-CM

## 2020-01-10 DIAGNOSIS — R63.5 WEIGHT GAIN FOLLOWING GASTRIC BYPASS SURGERY: ICD-10-CM

## 2020-01-10 DIAGNOSIS — E66.9 OBESITY (BMI 30-39.9): ICD-10-CM

## 2020-01-10 DIAGNOSIS — R74.8 ALKALINE PHOSPHATASE ELEVATION: ICD-10-CM

## 2020-01-10 DIAGNOSIS — K80.20 CALCULUS OF GALLBLADDER WITHOUT CHOLECYSTITIS WITHOUT OBSTRUCTION: ICD-10-CM

## 2020-01-10 DIAGNOSIS — E11.9 TYPE 2 DIABETES MELLITUS WITHOUT COMPLICATION, WITHOUT LONG-TERM CURRENT USE OF INSULIN (HCC): ICD-10-CM

## 2020-01-10 LAB
ALBUMIN SERPL BCP-MCNC: 3.8 G/DL (ref 3.5–5)
ALP SERPL-CCNC: 109 U/L (ref 46–116)
ALT SERPL W P-5'-P-CCNC: 36 U/L (ref 12–78)
ANION GAP SERPL CALCULATED.3IONS-SCNC: 3 MMOL/L (ref 4–13)
AST SERPL W P-5'-P-CCNC: 22 U/L (ref 5–45)
BILIRUB SERPL-MCNC: 1.11 MG/DL (ref 0.2–1)
BUN SERPL-MCNC: 13 MG/DL (ref 5–25)
CALCIUM SERPL-MCNC: 9 MG/DL (ref 8.3–10.1)
CHLORIDE SERPL-SCNC: 109 MMOL/L (ref 100–108)
CHOLEST SERPL-MCNC: 169 MG/DL (ref 50–200)
CO2 SERPL-SCNC: 28 MMOL/L (ref 21–32)
CREAT SERPL-MCNC: 0.9 MG/DL (ref 0.6–1.3)
CREAT UR-MCNC: 181 MG/DL
EST. AVERAGE GLUCOSE BLD GHB EST-MCNC: 151 MG/DL
GFR SERPL CREATININE-BSD FRML MDRD: 102 ML/MIN/1.73SQ M
GLUCOSE P FAST SERPL-MCNC: 143 MG/DL (ref 65–99)
HBA1C MFR BLD: 6.9 % (ref 4.2–6.3)
HDLC SERPL-MCNC: 48 MG/DL
LDLC SERPL CALC-MCNC: 102 MG/DL (ref 0–100)
MICROALBUMIN UR-MCNC: 8 MG/L (ref 0–20)
MICROALBUMIN/CREAT 24H UR: 4 MG/G CREATININE (ref 0–30)
POTASSIUM SERPL-SCNC: 4.1 MMOL/L (ref 3.5–5.3)
PROT SERPL-MCNC: 7 G/DL (ref 6.4–8.2)
SODIUM SERPL-SCNC: 140 MMOL/L (ref 136–145)
TRIGL SERPL-MCNC: 96 MG/DL

## 2020-01-10 PROCEDURE — 83036 HEMOGLOBIN GLYCOSYLATED A1C: CPT

## 2020-01-10 PROCEDURE — 82570 ASSAY OF URINE CREATININE: CPT

## 2020-01-10 PROCEDURE — 3061F NEG MICROALBUMINURIA REV: CPT | Performed by: FAMILY MEDICINE

## 2020-01-10 PROCEDURE — 36415 COLL VENOUS BLD VENIPUNCTURE: CPT

## 2020-01-10 PROCEDURE — 80053 COMPREHEN METABOLIC PANEL: CPT

## 2020-01-10 PROCEDURE — 80061 LIPID PANEL: CPT

## 2020-01-10 PROCEDURE — 82043 UR ALBUMIN QUANTITATIVE: CPT

## 2020-01-14 DIAGNOSIS — E11.9 TYPE 2 DIABETES MELLITUS WITHOUT COMPLICATION, WITHOUT LONG-TERM CURRENT USE OF INSULIN (HCC): ICD-10-CM

## 2020-01-14 RX ORDER — METFORMIN HYDROCHLORIDE 500 MG/1
500 TABLET, EXTENDED RELEASE ORAL
Qty: 30 TABLET | Refills: 5 | Status: SHIPPED | OUTPATIENT
Start: 2020-01-14 | End: 2020-06-12

## 2020-01-14 NOTE — TELEPHONE ENCOUNTER
Pt presented in office today to review labs with Dr Mick Scales at 11:30, due to provider running behind and hour pt was unable to stay as he needed to head back to work  He was also unable to schedule f/u with pcp because of computer issues at check in

## 2020-01-27 ENCOUNTER — OFFICE VISIT (OUTPATIENT)
Dept: FAMILY MEDICINE CLINIC | Facility: CLINIC | Age: 45
End: 2020-01-27
Payer: COMMERCIAL

## 2020-01-27 VITALS
DIASTOLIC BLOOD PRESSURE: 70 MMHG | HEART RATE: 60 BPM | WEIGHT: 262 LBS | BODY MASS INDEX: 35.49 KG/M2 | SYSTOLIC BLOOD PRESSURE: 100 MMHG | HEIGHT: 72 IN

## 2020-01-27 DIAGNOSIS — R00.0 TACHYCARDIA: ICD-10-CM

## 2020-01-27 DIAGNOSIS — E78.5 HYPERLIPIDEMIA, UNSPECIFIED HYPERLIPIDEMIA TYPE: ICD-10-CM

## 2020-01-27 DIAGNOSIS — Z98.84 WEIGHT GAIN FOLLOWING GASTRIC BYPASS SURGERY: ICD-10-CM

## 2020-01-27 DIAGNOSIS — E66.9 OBESITY (BMI 30-39.9): ICD-10-CM

## 2020-01-27 DIAGNOSIS — E11.9 TYPE 2 DIABETES MELLITUS WITHOUT COMPLICATION, WITHOUT LONG-TERM CURRENT USE OF INSULIN (HCC): Primary | ICD-10-CM

## 2020-01-27 DIAGNOSIS — Z23 ENCOUNTER FOR IMMUNIZATION: ICD-10-CM

## 2020-01-27 DIAGNOSIS — E66.01 SEVERE OBESITY (BMI 35.0-39.9) WITH COMORBIDITY (HCC): ICD-10-CM

## 2020-01-27 DIAGNOSIS — R74.8 ALKALINE PHOSPHATASE ELEVATION: ICD-10-CM

## 2020-01-27 DIAGNOSIS — R63.5 WEIGHT GAIN FOLLOWING GASTRIC BYPASS SURGERY: ICD-10-CM

## 2020-01-27 PROBLEM — R10.9 ABDOMINAL PAIN: Status: RESOLVED | Noted: 2019-07-11 | Resolved: 2020-01-27

## 2020-01-27 PROCEDURE — 99214 OFFICE O/P EST MOD 30 MIN: CPT | Performed by: FAMILY MEDICINE

## 2020-01-27 PROCEDURE — 1036F TOBACCO NON-USER: CPT | Performed by: FAMILY MEDICINE

## 2020-01-27 PROCEDURE — 3008F BODY MASS INDEX DOCD: CPT | Performed by: FAMILY MEDICINE

## 2020-01-27 NOTE — ASSESSMENT & PLAN NOTE
Lab Results   Component Value Date    HGBA1C 6 9 (H) 01/10/2020   Stable continue metformin 500 mg daily

## 2020-01-27 NOTE — PATIENT INSTRUCTIONS
Diet exercise weight loss recommended  Take metformin the same time every day even if not eating at that time  Patient to return in 6 months for office visit blood work    If fast heart rate reoccurs call office report to [facility]

## 2020-01-27 NOTE — PROGRESS NOTES
Assessment and Plan:  1  Type 2 diabetes without complications, continue metformin daily  May take once a day even if not eating that day  2  Hyperlipidemia LDL is risen of 102  If over 100 next office visit consideration be for statin therapy  3  Alkaline phosphatase elevation, normal isoenzymes in the past total normal the present time continue to monitor  4  Severe obesity with BMI of 35 53/weight gain following gastric bypass surgery diet exercise and weight loss recommended  5  Episode of tachycardia  Patient was asymptomatic  Patient did have a hangover  I feel hangover/dehydration/hot tub contribute to this  If reoccurs return to office  6  Patient return in 6 months for office visit blood work sooner if need      Problem List Items Addressed This Visit        Endocrine    Type 2 diabetes mellitus without complication, without long-term current use of insulin ([facility] [address] ) - Primary       Lab Results   Component Value Date    HGBA1C 6 9 (H) 01/10/2020   Stable continue metformin 500 mg daily         Relevant Orders    CBC    Comprehensive metabolic panel    Hemoglobin A1C    Lipid Panel with Direct LDL reflex    Microalbumin / creatinine urine ratio    TSH, 3rd generation with Free T4 reflex    UA (URINE) with reflex to Scope       Other    Alkaline phosphatase elevation     Level normal continue to monitor         Relevant Orders    CBC    Comprehensive metabolic panel    Hemoglobin A1C    Lipid Panel with Direct LDL reflex    Microalbumin / creatinine urine ratio    TSH, 3rd generation with Free T4 reflex    UA (URINE) with reflex to Scope    Weight gain following gastric bypass surgery     As above         Relevant Orders    CBC    Comprehensive metabolic panel    Hemoglobin A1C    Lipid Panel with Direct LDL reflex    Microalbumin / creatinine urine ratio    TSH, 3rd generation with Free T4 reflex    UA (URINE) with reflex to Scope    Hyperlipidemia     LDL has gone from     If over 100 next office visit will consider statin therapy         Relevant Orders    CBC    Comprehensive metabolic panel    Hemoglobin A1C    Lipid Panel with Direct LDL reflex    Microalbumin / creatinine urine ratio    TSH, 3rd generation with Free T4 reflex    UA (URINE) with reflex to Scope    Severe obesity (BMI 35 0-39  9) with comorbidity ([address] )     Patient gained 6 lb  BMI of 35 53 discussed  Diet exercise weight loss recommended         Relevant Orders    CBC    Comprehensive metabolic panel    Hemoglobin A1C    Lipid Panel with Direct LDL reflex    Microalbumin / creatinine urine ratio    TSH, 3rd generation with Free T4 reflex    UA (URINE) with reflex to Scope    RESOLVED: Obesity (BMI 30-39  9)    Relevant Orders    CBC    Comprehensive metabolic panel    Hemoglobin A1C    Lipid Panel with Direct LDL reflex    Microalbumin / creatinine urine ratio    TSH, 3rd generation with Free T4 reflex    UA (URINE) with reflex to Scope      Other Visit Diagnoses     Encounter for immunization        Relevant Orders    HEPATITIS A VACCINE ADULT IM    HEPATITIS B VACCINE ADULT IM    Tachycardia        Relevant Orders    CBC    Comprehensive metabolic panel    Hemoglobin A1C    Lipid Panel with Direct LDL reflex    Microalbumin / creatinine urine ratio    TSH, 3rd generation with Free T4 reflex    UA (URINE) with reflex to Scope                 Diagnoses and all orders for this visit:    Type 2 diabetes mellitus without complication, without long-term current use of insulin (HCC)  -     CBC; Future  -     Comprehensive metabolic panel; Future  -     Hemoglobin A1C; Future  -     Lipid Panel with Direct LDL reflex; Future  -     Microalbumin / creatinine urine ratio; Future  -     TSH, 3rd generation with Free T4 reflex; Future  -     UA (URINE) with reflex to Scope;  Future    Encounter for immunization  -     HEPATITIS A VACCINE ADULT IM  -     HEPATITIS B VACCINE ADULT IM    Hyperlipidemia, unspecified hyperlipidemia type  -     CBC; Future  -     Comprehensive metabolic panel; Future  -     Hemoglobin A1C; Future  -     Lipid Panel with Direct LDL reflex; Future  -     Microalbumin / creatinine urine ratio; Future  -     TSH, 3rd generation with Free T4 reflex; Future  -     UA (URINE) with reflex to Scope; Future    Alkaline phosphatase elevation  -     CBC; Future  -     Comprehensive metabolic panel; Future  -     Hemoglobin A1C; Future  -     Lipid Panel with Direct LDL reflex; Future  -     Microalbumin / creatinine urine ratio; Future  -     TSH, 3rd generation with Free T4 reflex; Future  -     UA (URINE) with reflex to Scope; Future    Obesity (BMI 30-39 9)  -     CBC; Future  -     Comprehensive metabolic panel; Future  -     Hemoglobin A1C; Future  -     Lipid Panel with Direct LDL reflex; Future  -     Microalbumin / creatinine urine ratio; Future  -     TSH, 3rd generation with Free T4 reflex; Future  -     UA (URINE) with reflex to Scope; Future    Severe obesity (BMI 35 0-39  9) with comorbidity ([address] )  -     CBC; Future  -     Comprehensive metabolic panel; Future  -     Hemoglobin A1C; Future  -     Lipid Panel with Direct LDL reflex; Future  -     Microalbumin / creatinine urine ratio; Future  -     TSH, 3rd generation with Free T4 reflex; Future  -     UA (URINE) with reflex to Scope; Future    Weight gain following gastric bypass surgery  -     CBC; Future  -     Comprehensive metabolic panel; Future  -     Hemoglobin A1C; Future  -     Lipid Panel with Direct LDL reflex; Future  -     Microalbumin / creatinine urine ratio; Future  -     TSH, 3rd generation with Free T4 reflex; Future  -     UA (URINE) with reflex to Scope; Future    Tachycardia  -     CBC; Future  -     Comprehensive metabolic panel; Future  -     Hemoglobin A1C; Future  -     Lipid Panel with Direct LDL reflex; Future  -     Microalbumin / creatinine urine ratio; Future  -     TSH, 3rd generation with Free T4 reflex;  Future  -     UA (URINE) with reflex to Scope; Future              Subjective:      Patient ID: Alvarez Mcdonough is a 54 y o  male  CC:    Chief Complaint   Patient presents with    Follow-up    Results     review recent lab results  HPI:    Patient is doing well  Patient stated over the weekend he was at a party drink too much  Woke up the next day not feeling well  Was drinking Gatorade with the hot tub his pulse was up for little bit 120  Was regular patient otherwise had absolutely 0 symptoms  Patient's pulse is been normal since that time  Told patient I believe is probably dehydrated from the alcohol and also temperature elevation from the hot tub can raise is  If this reoccurs patient will return to office  At the present time exam was normal   Blood work was discussed  Patient gained 6 lb since last office visit      The following portions of the patient's history were reviewed and updated as appropriate: allergies, current medications, past family history, past medical history, past social history, past surgical history and problem list       Review of Systems   Constitutional:        HPI   HENT: Negative  Eyes: Negative  Respiratory: Negative  Cardiovascular:        HPI   Gastrointestinal: Negative  Endocrine:        Diabetes is well controlled   Musculoskeletal: Negative  Skin: Negative  Allergic/Immunologic: Negative  Neurological: Negative  Hematological: Negative  Psychiatric/Behavioral: Negative  Data to review:       Objective:    Vitals:    01/27/20 0809   BP: 100/70   BP Location: Left arm   Patient Position: Sitting   Cuff Size: Large   Pulse: 60   Weight: 119 kg (262 lb)   Height: 6' (1 829 m)        Physical Exam   Constitutional: He is oriented to person, place, and time  He appears well-developed and well-nourished  HENT:   Head: Normocephalic and atraumatic  Mouth/Throat: Oropharynx is clear and moist    Eyes: No scleral icterus  Neck: Neck supple  No JVD present  Cardiovascular: Normal rate, regular rhythm and normal heart sounds  Pulmonary/Chest: Effort normal and breath sounds normal    Abdominal: Soft  Bowel sounds are normal  There is no tenderness  Musculoskeletal: He exhibits no edema  Neurological: He is alert and oriented to person, place, and time  No cranial nerve deficit  Skin: Skin is warm and dry  Psychiatric: He has a normal mood and affect  BMI Counseling: Body mass index is 35 53 kg/m²  The BMI is above normal  Nutrition recommendations include moderation in carbohydrate intake and reducing intake of cholesterol  Exercise recommendations include exercising 3-5 times per week

## 2020-03-13 ENCOUNTER — APPOINTMENT (OUTPATIENT)
Dept: URGENT CARE | Facility: MEDICAL CENTER | Age: 45
End: 2020-03-13

## 2020-03-13 ENCOUNTER — APPOINTMENT (OUTPATIENT)
Dept: LAB | Facility: MEDICAL CENTER | Age: 45
End: 2020-03-13

## 2020-03-13 ENCOUNTER — TRANSCRIBE ORDERS (OUTPATIENT)
Dept: URGENT CARE | Facility: MEDICAL CENTER | Age: 45
End: 2020-03-13

## 2020-03-13 DIAGNOSIS — Z02.1 PRE-EMPLOYMENT EXAMINATION: Primary | ICD-10-CM

## 2020-03-13 DIAGNOSIS — Z02.1 PRE-EMPLOYMENT EXAMINATION: ICD-10-CM

## 2020-03-13 LAB — RUBV IGG SERPL IA-ACNC: >175 IU/ML

## 2020-03-13 PROCEDURE — 86735 MUMPS ANTIBODY: CPT

## 2020-03-13 PROCEDURE — 86480 TB TEST CELL IMMUN MEASURE: CPT

## 2020-03-13 PROCEDURE — 36415 COLL VENOUS BLD VENIPUNCTURE: CPT

## 2020-03-13 PROCEDURE — 86762 RUBELLA ANTIBODY: CPT

## 2020-03-13 PROCEDURE — 86765 RUBEOLA ANTIBODY: CPT

## 2020-03-13 PROCEDURE — 86787 VARICELLA-ZOSTER ANTIBODY: CPT

## 2020-03-16 LAB
GAMMA INTERFERON BACKGROUND BLD IA-ACNC: 0.04 IU/ML
M TB IFN-G BLD-IMP: NEGATIVE
M TB IFN-G CD4+ BCKGRND COR BLD-ACNC: 0 IU/ML
M TB IFN-G CD4+ BCKGRND COR BLD-ACNC: 0.01 IU/ML
MITOGEN IGNF BCKGRD COR BLD-ACNC: >10 IU/ML

## 2020-03-17 LAB
MEV IGG SER QL: NORMAL
MUV IGG SER QL: NORMAL
VZV IGG SER IA-ACNC: NORMAL

## 2020-04-18 ENCOUNTER — TELEPHONE (OUTPATIENT)
Dept: FAMILY MEDICINE CLINIC | Facility: CLINIC | Age: 45
End: 2020-04-18

## 2020-06-12 ENCOUNTER — TELEMEDICINE (OUTPATIENT)
Dept: FAMILY MEDICINE CLINIC | Facility: CLINIC | Age: 45
End: 2020-06-12
Payer: COMMERCIAL

## 2020-06-12 VITALS — BODY MASS INDEX: 35.21 KG/M2 | WEIGHT: 260 LBS | HEIGHT: 72 IN

## 2020-06-12 DIAGNOSIS — E78.5 HYPERLIPIDEMIA, UNSPECIFIED HYPERLIPIDEMIA TYPE: ICD-10-CM

## 2020-06-12 DIAGNOSIS — E11.9 TYPE 2 DIABETES MELLITUS WITHOUT COMPLICATION, WITHOUT LONG-TERM CURRENT USE OF INSULIN (HCC): Primary | ICD-10-CM

## 2020-06-12 DIAGNOSIS — R74.8 ALKALINE PHOSPHATASE ELEVATION: ICD-10-CM

## 2020-06-12 DIAGNOSIS — E66.01 SEVERE OBESITY (BMI 35.0-39.9) WITH COMORBIDITY (HCC): ICD-10-CM

## 2020-06-12 PROCEDURE — 3008F BODY MASS INDEX DOCD: CPT | Performed by: FAMILY MEDICINE

## 2020-06-12 PROCEDURE — 99213 OFFICE O/P EST LOW 20 MIN: CPT | Performed by: FAMILY MEDICINE

## 2020-08-14 ENCOUNTER — APPOINTMENT (OUTPATIENT)
Dept: LAB | Facility: CLINIC | Age: 45
End: 2020-08-14
Payer: COMMERCIAL

## 2020-08-14 DIAGNOSIS — R00.0 TACHYCARDIA: ICD-10-CM

## 2020-08-14 DIAGNOSIS — R74.8 ALKALINE PHOSPHATASE ELEVATION: ICD-10-CM

## 2020-08-14 DIAGNOSIS — E66.9 OBESITY (BMI 30-39.9): ICD-10-CM

## 2020-08-14 DIAGNOSIS — Z98.84 WEIGHT GAIN FOLLOWING GASTRIC BYPASS SURGERY: ICD-10-CM

## 2020-08-14 DIAGNOSIS — E78.5 HYPERLIPIDEMIA, UNSPECIFIED HYPERLIPIDEMIA TYPE: ICD-10-CM

## 2020-08-14 DIAGNOSIS — E11.9 TYPE 2 DIABETES MELLITUS WITHOUT COMPLICATION, WITHOUT LONG-TERM CURRENT USE OF INSULIN (HCC): ICD-10-CM

## 2020-08-14 DIAGNOSIS — E66.01 SEVERE OBESITY (BMI 35.0-39.9) WITH COMORBIDITY (HCC): ICD-10-CM

## 2020-08-14 DIAGNOSIS — R63.5 WEIGHT GAIN FOLLOWING GASTRIC BYPASS SURGERY: ICD-10-CM

## 2020-08-14 LAB
ALBUMIN SERPL BCP-MCNC: 4 G/DL (ref 3.5–5)
ALP SERPL-CCNC: 91 U/L (ref 46–116)
ALT SERPL W P-5'-P-CCNC: 31 U/L (ref 12–78)
ANION GAP SERPL CALCULATED.3IONS-SCNC: 5 MMOL/L (ref 4–13)
AST SERPL W P-5'-P-CCNC: 25 U/L (ref 5–45)
BILIRUB SERPL-MCNC: 0.87 MG/DL (ref 0.2–1)
BILIRUB UR QL STRIP: NEGATIVE
BUN SERPL-MCNC: 17 MG/DL (ref 5–25)
CALCIUM SERPL-MCNC: 8.3 MG/DL (ref 8.3–10.1)
CHLORIDE SERPL-SCNC: 111 MMOL/L (ref 100–108)
CHOLEST SERPL-MCNC: 152 MG/DL (ref 50–200)
CLARITY UR: CLEAR
CO2 SERPL-SCNC: 24 MMOL/L (ref 21–32)
COLOR UR: YELLOW
CREAT SERPL-MCNC: 0.86 MG/DL (ref 0.6–1.3)
CREAT UR-MCNC: 150 MG/DL
ERYTHROCYTE [DISTWIDTH] IN BLOOD BY AUTOMATED COUNT: 13 % (ref 11.6–15.1)
EST. AVERAGE GLUCOSE BLD GHB EST-MCNC: 134 MG/DL
GFR SERPL CREATININE-BSD FRML MDRD: 104 ML/MIN/1.73SQ M
GLUCOSE P FAST SERPL-MCNC: 112 MG/DL (ref 65–99)
GLUCOSE UR STRIP-MCNC: NEGATIVE MG/DL
HBA1C MFR BLD: 6.3 %
HCT VFR BLD AUTO: 46 % (ref 36.5–49.3)
HDLC SERPL-MCNC: 42 MG/DL
HGB BLD-MCNC: 15.5 G/DL (ref 12–17)
HGB UR QL STRIP.AUTO: NEGATIVE
KETONES UR STRIP-MCNC: NEGATIVE MG/DL
LDLC SERPL CALC-MCNC: 95 MG/DL (ref 0–100)
LEUKOCYTE ESTERASE UR QL STRIP: NEGATIVE
MCH RBC QN AUTO: 31.2 PG (ref 26.8–34.3)
MCHC RBC AUTO-ENTMCNC: 33.7 G/DL (ref 31.4–37.4)
MCV RBC AUTO: 93 FL (ref 82–98)
MICROALBUMIN UR-MCNC: 6.5 MG/L (ref 0–20)
MICROALBUMIN/CREAT 24H UR: 4 MG/G CREATININE (ref 0–30)
NITRITE UR QL STRIP: NEGATIVE
PH UR STRIP.AUTO: 6 [PH]
PLATELET # BLD AUTO: 258 THOUSANDS/UL (ref 149–390)
PMV BLD AUTO: 10.9 FL (ref 8.9–12.7)
POTASSIUM SERPL-SCNC: 4.1 MMOL/L (ref 3.5–5.3)
PROT SERPL-MCNC: 7 G/DL (ref 6.4–8.2)
PROT UR STRIP-MCNC: NEGATIVE MG/DL
RBC # BLD AUTO: 4.97 MILLION/UL (ref 3.88–5.62)
SODIUM SERPL-SCNC: 140 MMOL/L (ref 136–145)
SP GR UR STRIP.AUTO: 1.03 (ref 1–1.03)
TRIGL SERPL-MCNC: 77 MG/DL
TSH SERPL DL<=0.05 MIU/L-ACNC: 2.34 UIU/ML (ref 0.36–3.74)
UROBILINOGEN UR QL STRIP.AUTO: 0.2 E.U./DL
WBC # BLD AUTO: 6.09 THOUSAND/UL (ref 4.31–10.16)

## 2020-08-14 PROCEDURE — 85027 COMPLETE CBC AUTOMATED: CPT

## 2020-08-14 PROCEDURE — 82570 ASSAY OF URINE CREATININE: CPT

## 2020-08-14 PROCEDURE — 82043 UR ALBUMIN QUANTITATIVE: CPT

## 2020-08-14 PROCEDURE — 83036 HEMOGLOBIN GLYCOSYLATED A1C: CPT

## 2020-08-14 PROCEDURE — 80053 COMPREHEN METABOLIC PANEL: CPT

## 2020-08-14 PROCEDURE — 3044F HG A1C LEVEL LT 7.0%: CPT | Performed by: FAMILY MEDICINE

## 2020-08-14 PROCEDURE — 3061F NEG MICROALBUMINURIA REV: CPT | Performed by: FAMILY MEDICINE

## 2020-08-14 PROCEDURE — 84443 ASSAY THYROID STIM HORMONE: CPT

## 2020-08-14 PROCEDURE — 80061 LIPID PANEL: CPT

## 2020-08-14 PROCEDURE — 81003 URINALYSIS AUTO W/O SCOPE: CPT

## 2020-08-14 PROCEDURE — 36415 COLL VENOUS BLD VENIPUNCTURE: CPT

## 2020-09-02 LAB
LEFT EYE DIABETIC RETINOPATHY: NORMAL
RIGHT EYE DIABETIC RETINOPATHY: NORMAL

## 2020-09-17 ENCOUNTER — OFFICE VISIT (OUTPATIENT)
Dept: FAMILY MEDICINE CLINIC | Facility: CLINIC | Age: 45
End: 2020-09-17
Payer: COMMERCIAL

## 2020-09-17 VITALS
RESPIRATION RATE: 16 BRPM | WEIGHT: 253 LBS | TEMPERATURE: 97.8 F | HEIGHT: 72 IN | SYSTOLIC BLOOD PRESSURE: 108 MMHG | BODY MASS INDEX: 34.27 KG/M2 | HEART RATE: 72 BPM | DIASTOLIC BLOOD PRESSURE: 66 MMHG

## 2020-09-17 DIAGNOSIS — Z11.4 SCREENING FOR HIV (HUMAN IMMUNODEFICIENCY VIRUS): ICD-10-CM

## 2020-09-17 DIAGNOSIS — E78.5 HYPERLIPIDEMIA, UNSPECIFIED HYPERLIPIDEMIA TYPE: ICD-10-CM

## 2020-09-17 DIAGNOSIS — E11.9 TYPE 2 DIABETES MELLITUS WITHOUT COMPLICATION, WITHOUT LONG-TERM CURRENT USE OF INSULIN (HCC): Primary | ICD-10-CM

## 2020-09-17 DIAGNOSIS — E66.9 OBESITY (BMI 30-39.9): ICD-10-CM

## 2020-09-17 DIAGNOSIS — R74.8 ALKALINE PHOSPHATASE ELEVATION: ICD-10-CM

## 2020-09-17 DIAGNOSIS — F52.32 DELAYED EJACULATION: ICD-10-CM

## 2020-09-17 PROBLEM — E66.01 SEVERE OBESITY (BMI 35.0-39.9) WITH COMORBIDITY (HCC): Status: RESOLVED | Noted: 2020-01-27 | Resolved: 2020-09-17

## 2020-09-17 PROCEDURE — 1036F TOBACCO NON-USER: CPT | Performed by: FAMILY MEDICINE

## 2020-09-17 PROCEDURE — 3725F SCREEN DEPRESSION PERFORMED: CPT | Performed by: FAMILY MEDICINE

## 2020-09-17 PROCEDURE — 99214 OFFICE O/P EST MOD 30 MIN: CPT | Performed by: FAMILY MEDICINE

## 2020-09-17 NOTE — PATIENT INSTRUCTIONS
Complete testosterone level  Continue diet exercise weight loss  I recommend contacting insurance for coverage approval before HIV screening completed  Return in 6 months for office visit blood work sooner if need

## 2020-09-17 NOTE — ASSESSMENT & PLAN NOTE
Possibly from diabetes will check testosterone level recommended urology consultation patient wishes to hold off on urology consult patient the present time

## 2020-09-17 NOTE — PROGRESS NOTES
Assessment and Plan:  1  Type 2 diabetes, stable continue present therapy foot exam was completed  Will obtain patient's diabetic eye exam which was completed recently by Ophthalmology  2  Hyperlipidemia, diet controlled  3  Alkaline phosphatase elevation, normal the present time continue to monitor  4  BMI 34 31 patient lost 7 lb continue diet exercise weight loss recommended  5  Delayed ejaculation will check a testosterone level offered urology consultation patient declined the present time  6  Screening for HIV discussed with patient order was placed  7  Patient to return in 6 months for office visit blood work sooner if needed     Problem List Items Addressed This Visit        Endocrine    Type 2 diabetes mellitus without complication, without long-term current use of insulin ([address] ) - Primary       Lab Results   Component Value Date    HGBA1C 6 3 (H) 08/14/2020   Stable continue present therapy diabetic foot exam was completed  Will obtain diabetic eye exams by patient's optometrist            Genitourinary    Delayed ejaculation     Possibly from diabetes will check testosterone level recommended urology consultation patient wishes to hold off on urology consult patient the present time         Relevant Orders    Testosterone       Other    Alkaline phosphatase elevation     Alkaline phosphatase level normal will monitor         Hyperlipidemia     Stable, diet controlled         Obesity (BMI 30-39  9)     BMI is 34 31 patient lost 7 lb continue diet exercise weight loss recommended           Other Visit Diagnoses     Screening for HIV (human immunodeficiency virus)        Relevant Orders    HIV 1/2 Antigen/Antibody (4th Generation) w Reflex [facility]                 Diagnoses and all orders for this visit:    Type 2 diabetes mellitus without complication, without long-term current use of insulin ([address] )  -     Cancel: [facility] Diabetic eye exam    Screening for HIV (human immunodeficiency virus)  -     HIV 1/2 Antigen/Antibody (4th Generation) w Reflex [facility]; Future    Hyperlipidemia, unspecified hyperlipidemia type    Alkaline phosphatase elevation    Obesity (BMI 30-39  9)    Delayed ejaculation  -     Testosterone; Future              Subjective:      Patient ID: Bekah Pradhan is a 54 y o  male  CC:    Chief Complaint   Patient presents with    Follow-up     pt here for a follow up and to review lab results  SOLEDAD Marcos       HPI:    Patient sugars have been good patient has recently seen ophthalmology will get a report of his diabetic eye exam   Patient did lose 7 lb since last office visit  Patient to a complaint is sometimes trouble with for long ejaculation sometimes no ejaculation  Patient has no issues with erection  Blood work was discussed with the patient      The following portions of the patient's history were reviewed and updated as appropriate: allergies, current medications, past family history, past medical history, past social history, past surgical history and problem list       Review of Systems   Constitutional: Negative  HENT: Negative  Eyes: Negative  Respiratory: Negative  Cardiovascular: Negative  Gastrointestinal: Negative  Endocrine:        HPI   Genitourinary:        HPI   Musculoskeletal: Negative  Skin: Negative  Allergic/Immunologic: Negative  Neurological: Negative  Hematological: Negative  Psychiatric/Behavioral: Negative  Data to review:       Objective:    Vitals:    09/17/20 1500   BP: 108/66   BP Location: Left arm   Patient Position: Sitting   Cuff Size: Large   Pulse: 72   Resp: 16   Temp: 97 8 °F (36 6 °C)   TempSrc: Temporal   Weight: 115 kg (253 lb)   Height: 6' (1 829 m)        Physical Exam  Vitals signs and nursing note reviewed  Constitutional:       Appearance: Normal appearance  HENT:      Head: Normocephalic and atraumatic  Eyes:      General: No scleral icterus  Neck:      Musculoskeletal: Neck supple        Vascular: No carotid bruit  Cardiovascular:      Rate and Rhythm: Rhythm irregular  Pulses: Normal pulses  Dorsalis pedis pulses are 2+ on the right side and 2+ on the left side  Posterior tibial pulses are 2+ on the right side and 2+ on the left side  Heart sounds: Normal heart sounds  Pulmonary:      Effort: Pulmonary effort is normal       Breath sounds: Normal breath sounds  Abdominal:      General: Bowel sounds are normal       Palpations: Abdomen is soft  Tenderness: There is no abdominal tenderness  Musculoskeletal:      Right lower leg: No edema  Left lower leg: No edema  Feet:      Right foot:      Skin integrity: No ulcer, skin breakdown, erythema, warmth, callus or dry skin  Left foot:      Skin integrity: No ulcer, skin breakdown, erythema, warmth, callus or dry skin  Skin:     General: Skin is warm and dry  Neurological:      General: No focal deficit present  Mental Status: He is alert  Psychiatric:         Mood and Affect: Mood normal          Patient's shoes and socks removed  Right Foot/Ankle   Right Foot Inspection  Skin Exam: skin normal and skin intact no dry skin, no warmth, no callus, no erythema, no maceration, no abnormal color, no pre-ulcer, no ulcer and no callus                          Toe Exam: ROM and strength within normal limits  Sensory   Vibration: intact  Proprioception: intact   Monofilament testing: intact  Vascular  Capillary refills: < 3 seconds  The right DP pulse is 2+  The right PT pulse is 2+     Right Toe  - Comprehensive Exam  Arch: normal  Hammertoes: absent  Claw Toes: absent  Swelling: none   Tenderness: none         Left Foot/Ankle  Left Foot Inspection  Skin Exam: skin normal and skin intactno dry skin, no warmth, no erythema, no maceration, normal color, no pre-ulcer, no ulcer and no callus                         Toe Exam: ROM and strength within normal limits                   Sensory   Vibration: intact  Proprioception: intact  Monofilament: intact  Vascular  Capillary refills: < 3 seconds  The left DP pulse is 2+  The left PT pulse is 2+  Left Toe  - Comprehensive Exam  Arch: normal  Hammertoes: absent  Claw toes: absent  Swelling: none   Tenderness: none       Assign Risk Category:  No deformity present; ;        Risk: 0       BMI Counseling: Body mass index is 34 31 kg/m²  The BMI is above normal  Nutrition recommendations include moderation in carbohydrate intake and reducing intake of cholesterol

## 2020-09-17 NOTE — ASSESSMENT & PLAN NOTE
Lab Results   Component Value Date    HGBA1C 6 3 (H) 08/14/2020   Stable continue present therapy diabetic foot exam was completed    Will obtain diabetic eye exams by patient's optometrist

## 2020-09-18 ENCOUNTER — TELEPHONE (OUTPATIENT)
Dept: ADMINISTRATIVE | Facility: OTHER | Age: 45
End: 2020-09-18

## 2020-09-18 NOTE — TELEPHONE ENCOUNTER
----- Message from Trang Henley sent at 9/17/2020  3:06 PM EDT -----  Regarding: [facility] [phone]  09/17/20 3:07 PM    Hello, our patient Bekah Dejesus has had Diabetic Eye Exam completed/performed   Please assist in updating the patient chart by making an External outreach to [facility] located in in [facility] date of service is 9/8/2020    Thank you,  Jailyn Hays MA  [facility]

## 2020-09-18 NOTE — TELEPHONE ENCOUNTER
Upon review of the In Basket request and the patient's chart, initial outreach has been made via telephone, please see Contacts section for details  A second outreach attempt will be made within 5 business days      Thank you  Ramakrishna Cleveland

## 2020-09-21 NOTE — TELEPHONE ENCOUNTER
Upon review of the In Basket request we were able to locate, review, and update the patient chart as requested for Diabetic Eye Exam     Any additional questions or concerns should be emailed to the Practice Liaisons via [email or web address] email, please do not reply via In Basket      Thank you  Farnaz Ramirez

## 2020-12-09 DIAGNOSIS — E11.9 TYPE 2 DIABETES MELLITUS WITHOUT COMPLICATION, WITHOUT LONG-TERM CURRENT USE OF INSULIN (HCC): ICD-10-CM

## 2021-02-04 ENCOUNTER — APPOINTMENT (OUTPATIENT)
Dept: RADIOLOGY | Facility: MEDICAL CENTER | Age: 46
End: 2021-02-04
Payer: COMMERCIAL

## 2021-02-04 ENCOUNTER — TELEMEDICINE (OUTPATIENT)
Dept: FAMILY MEDICINE CLINIC | Facility: CLINIC | Age: 46
End: 2021-02-04
Payer: COMMERCIAL

## 2021-02-04 DIAGNOSIS — M54.2 NECK PAIN: ICD-10-CM

## 2021-02-04 DIAGNOSIS — L30.9 DERMATITIS: ICD-10-CM

## 2021-02-04 DIAGNOSIS — J04.0 LARYNGITIS: ICD-10-CM

## 2021-02-04 DIAGNOSIS — J30.9 ALLERGIC RHINITIS, UNSPECIFIED SEASONALITY, UNSPECIFIED TRIGGER: ICD-10-CM

## 2021-02-04 DIAGNOSIS — J04.0 LARYNGITIS: Primary | ICD-10-CM

## 2021-02-04 DIAGNOSIS — R49.0 HOARSENESS: ICD-10-CM

## 2021-02-04 DIAGNOSIS — E11.9 TYPE 2 DIABETES MELLITUS WITHOUT COMPLICATION, WITHOUT LONG-TERM CURRENT USE OF INSULIN (HCC): ICD-10-CM

## 2021-02-04 PROCEDURE — 99214 OFFICE O/P EST MOD 30 MIN: CPT | Performed by: FAMILY MEDICINE

## 2021-02-04 PROCEDURE — 70360 X-RAY EXAM OF NECK: CPT

## 2021-02-04 RX ORDER — PREDNISONE 20 MG/1
TABLET ORAL
Qty: 20 TABLET | Refills: 0 | Status: SHIPPED | OUTPATIENT
Start: 2021-02-04 | End: 2021-02-14

## 2021-02-04 RX ORDER — KETOCONAZOLE 20 MG/G
CREAM TOPICAL 2 TIMES DAILY
Qty: 30 G | Refills: 1 | Status: SHIPPED | OUTPATIENT
Start: 2021-02-04 | End: 2021-04-01

## 2021-02-04 NOTE — PROGRESS NOTES
Virtual Regular Visit      Assessment/Plan:   1  Hoarseness  2  Laryngitis    3  Neck pain   Soft tissue neck x-ray is ordered, prednisone is ordered  4  Per allergic rhinitis, prednisone is ordered  5  Type 2 diabetes, blood sugar may be temporary elevated secondary prednisone  6  Dermatitis favor candidal, Nizoral was ordered  7  Patient return in office in 2 weeks  If no improvement next week call office          Problem List Items Addressed This Visit        Endocrine    Type 2 diabetes mellitus without complication, without long-term current use of insulin ([facility])       Lab Results   Component Value Date    HGBA1C 6 3 (H) 08/14/2020    blood sugars may be elevated with prednisone            Respiratory    Allergic rhinitis      Prednisone ordered         Relevant Medications    predniSONE 20 mg tablet      Other Visit Diagnoses     Laryngitis    -  Primary    Relevant Medications    predniSONE 20 mg tablet    Other Relevant Orders    XR neck soft tissue    Hoarseness        Relevant Medications    predniSONE 20 mg tablet    Other Relevant Orders    XR neck soft tissue    Neck pain        Relevant Medications    predniSONE 20 mg tablet    Other Relevant Orders    XR neck soft tissue    Dermatitis        Relevant Medications    predniSONE 20 mg tablet    ketoconazole (NIZORAL) 2 % cream    Other Relevant Orders    XR neck soft tissue               Reason for visit is   Chief Complaint   Patient presents with    Virtual Regular Visit        Encounter provider Nancy Valdez DO    Provider located at [address] [place] [address]  [phone]      Recent Visits  No visits were found meeting these conditions     Showing recent visits within past 7 days and meeting all other requirements     Today's Visits  Date Type Provider Dept   02/04/21 Telemedicine Nancy Valdez DO [facility]   Showing today's visits and meeting all other requirements     Future Appointments  No visits were found meeting these conditions  Showing future appointments within next 150 days and meeting all other requirements        The patient was identified by name and date of birth  Malina Talley was informed that this is a telemedicine visit and that the visit is being conducted through [employer] and patient was informed that this is not a secure, HIPAA-compliant platform  He agrees to proceed     My office door was closed  No one else was in the room  He acknowledged consent and understanding of privacy and security of the video platform  The patient has agreed to participate and understands they can discontinue the visit at any time  Patient is aware this is a billable service  Subjective  Malina Talley is a 51 y o  male  HPI       Patient states that he has had laryngitis and hoarseness since November  Patient does have a history of allergies and he gets occasional bits never lasted this long  No fever chills headache vertigo mild postnasal drip no abdominal pain and GI issues  Absolutely no symptomatology of COVID  Patient does have some rash bilateral axillary  Areas  Patient occasion with right sided neck pain       No past medical history on file      Past Surgical History:   Procedure Laterality Date    GASTRIC BYPASS      gastric surgery for morbid obesity-last assessed-9/19/2017    VASECTOMY      surgery vas deferens-last assessed-9/19/2017       Current Outpatient Medications   Medication Sig Dispense Refill    Blood Glucose Monitoring Suppl (ONE TOUCH ULTRA 2) w/Device KIT by Does not apply route daily 1 each 0    glucose blood (ONE TOUCH ULTRA TEST) test strip Use as instructed daily 100 each 3    ketoconazole (NIZORAL) 2 % cream Apply topically 2 (two) times a day 30 g 1    Lancets (ONETOUCH ULTRASOFT) lancets Use as instructed daily 100 each 3    metFORMIN (GLUCOPHAGE) 500 mg tablet take 1 tablet by mouth twice a day with meals 60 tablet 11    predniSONE 20 mg tablet Take 3 tablets daily for 3 days, 2 tablets daily for 3 days, 1 tablet daily for 4 days  Take with food 20 tablet 0     No current facility-administered medications for this visit  Allergies   Allergen Reactions    Nsaids      Hx gastric bypass       Review of Systems   Constitutional: Negative  HENT:        HPI   Eyes: Negative  Respiratory: Negative  Cardiovascular: Negative  Gastrointestinal:        HPI   Endocrine: Negative  Genitourinary: Negative  Musculoskeletal: Negative  Skin:        HPI   Allergic/Immunologic: Positive for environmental allergies  Neurological:        HPI   Hematological: Negative  Psychiatric/Behavioral: Negative  Video Exam    There were no vitals filed for this visit  Physical Exam  Constitutional:       General: He is not in acute distress  Appearance: Normal appearance  He is not ill-appearing, toxic-appearing or diaphoretic  HENT:      Head: Normocephalic and atraumatic  Eyes:      General: No scleral icterus  Right eye: No discharge  Left eye: No discharge  Conjunctiva/sclera: Conjunctivae normal    Neck:      Musculoskeletal: No neck rigidity  Pulmonary:      Effort: Pulmonary effort is normal    Skin:     General: Skin is dry  Neurological:      Mental Status: He is alert and oriented to person, place, and time  Cranial Nerves: Cranial nerve deficit present  Comments: Positive hoarseness   Psychiatric:         Mood and Affect: Mood normal          Behavior: Behavior normal          Thought Content: Thought content normal          Judgment: Judgment normal           I spent 18 minutes directly with the patient during this visit      VIRTUAL VISIT DISCLAIMER    Saran Noonan acknowledges that he has consented to an online visit or consultation   He understands that the online visit is based solely on information provided by him, and that, in the absence of a face-to-face physical evaluation by the physician, the diagnosis he receives is both limited and provisional in terms of accuracy and completeness  This is not intended to replace a full medical face-to-face evaluation by the physician  Irma Deal understands and accepts these terms

## 2021-02-04 NOTE — ASSESSMENT & PLAN NOTE
Lab Results   Component Value Date    HGBA1C 6 3 (H) 08/14/2020    blood sugars may be elevated with prednisone

## 2021-02-04 NOTE — PATIENT INSTRUCTIONS
Complete neck x-ray   Finish prednisone  Prednisone may temporally raise blood sugars   Use  Ketoconazole cream twice daily to rash area  Recheck in office 2 weeks

## 2021-02-25 ENCOUNTER — OFFICE VISIT (OUTPATIENT)
Dept: FAMILY MEDICINE CLINIC | Facility: CLINIC | Age: 46
End: 2021-02-25
Payer: COMMERCIAL

## 2021-02-25 VITALS
DIASTOLIC BLOOD PRESSURE: 76 MMHG | RESPIRATION RATE: 16 BRPM | WEIGHT: 249 LBS | TEMPERATURE: 98.1 F | BODY MASS INDEX: 33.72 KG/M2 | HEART RATE: 80 BPM | HEIGHT: 72 IN | SYSTOLIC BLOOD PRESSURE: 122 MMHG

## 2021-02-25 DIAGNOSIS — R49.0 CHRONIC HOARSENESS: ICD-10-CM

## 2021-02-25 DIAGNOSIS — E11.9 TYPE 2 DIABETES MELLITUS WITHOUT COMPLICATION, WITHOUT LONG-TERM CURRENT USE OF INSULIN (HCC): ICD-10-CM

## 2021-02-25 DIAGNOSIS — J30.9 ALLERGIC RHINITIS, UNSPECIFIED SEASONALITY, UNSPECIFIED TRIGGER: Primary | ICD-10-CM

## 2021-02-25 DIAGNOSIS — R09.82 POSTNASAL DRIP: ICD-10-CM

## 2021-02-25 DIAGNOSIS — E66.9 OBESITY (BMI 30-39.9): ICD-10-CM

## 2021-02-25 PROCEDURE — 99214 OFFICE O/P EST MOD 30 MIN: CPT | Performed by: FAMILY MEDICINE

## 2021-02-25 RX ORDER — DESLORATADINE 5 MG/1
5 TABLET ORAL DAILY
Qty: 30 TABLET | Refills: 5 | Status: SHIPPED | OUTPATIENT
Start: 2021-02-25 | End: 2021-05-06

## 2021-02-25 NOTE — PATIENT INSTRUCTIONS
Start Clarinex daily  Follow with ENT, Dr Janel Monson exercise and weight loss  Return at scheduled appointment sooner if needed

## 2021-02-25 NOTE — PROGRESS NOTES
Assessment and Plan:  1  Allergic rhinitis, Clarinex was started  2  Postnasal drip, as above  3  Chronic coarseness  Will refer to ENT for further evaluation  4  Type 2 diabetes patient has an appointment next month for follow-up with blood work  5  BMI 33 77 patient lost 4 lb continue diet exercise weight loss recommended  6  Return at scheduled appointment sooner if needed    Problem List Items Addressed This Visit        Endocrine    Type 2 diabetes mellitus without complication, without long-term current use of insulin ([facility] )       Lab Results   Component Value Date    HGBA1C 6 3 (H) 08/14/2020    patient has appointment in office next month for office visit and blood work follow-up            Respiratory    Allergic rhinitis - Primary      Clarinex ordered will refer to ENT         Relevant Medications    desloratadine (CLARINEX) 5 MG tablet    Other Relevant Orders    Ambulatory Referral to Otolaryngology       Other    Obesity (BMI 30-39  9)      BMI 33 77  Patient lost 4 lb since last office visit continue weight loss is recommended           Other Visit Diagnoses     Postnasal drip        Relevant Orders    Ambulatory Referral to Otolaryngology    Chronic hoarseness        Relevant Orders    Ambulatory Referral to Otolaryngology                 Diagnoses and all orders for this visit:    Allergic rhinitis, unspecified seasonality, unspecified trigger  -     desloratadine (CLARINEX) 5 MG tablet; Take 1 tablet (5 mg total) by mouth daily  -     Ambulatory Referral to Otolaryngology; Future    Postnasal drip  -     Ambulatory Referral to Otolaryngology; Future    Chronic hoarseness  -     Ambulatory Referral to Otolaryngology; Future    Obesity (BMI 30-39  9)    Type 2 diabetes mellitus without complication, without long-term current use of insulin ([facility])              Subjective:      Patient ID: Jamie Boone is a 51 y o  male      CC:    Chief Complaint   Patient presents with    Follow-up     pt here for a follow up and his throat issues  R<Hemant       HPI:     Patient is here for follow-up of hoarseness  Patient stated after the prednisone his neck pain actually completely 1 away but he still gets worse  Occasional postnasal drip  No fever chills cough no chest pain  Patient's soft tissue neck x-ray was normal      The following portions of the patient's history were reviewed and updated as appropriate: allergies, current medications, past family history, past medical history, past social history, past surgical history and problem list       Review of Systems   Constitutional: Negative for chills and fever  HENT:        HPI   Eyes: Negative  Respiratory: Negative  Cardiovascular: Negative  Gastrointestinal: Negative  Endocrine:        Has appointment next month for diabetic follow-up and blood work   Genitourinary: Negative  Musculoskeletal: Negative  Skin: Negative  Allergic/Immunologic: Positive for environmental allergies  Neurological:        HPI   Hematological: Negative  Psychiatric/Behavioral: Negative  Data to review:       Objective:    Vitals:    02/25/21 1732   BP: 122/76   BP Location: Left arm   Patient Position: Sitting   Cuff Size: Large   Pulse: 80   Resp: 16   Temp: 98 1 °F (36 7 °C)   TempSrc: Temporal   Weight: 113 kg (249 lb)   Height: 6' (1 829 m)        Physical Exam  Vitals signs and nursing note reviewed  Constitutional:       Appearance: Normal appearance  HENT:      Head: Normocephalic and atraumatic  Right Ear: Tympanic membrane normal       Left Ear: Tympanic membrane normal       Nose:      Comments: Mildly allergic turbinates     Mouth/Throat:      Pharynx: No oropharyngeal exudate or posterior oropharyngeal erythema  Comments: Scant clear postnasal drip negative pharyngeal injection or exudate  Eyes:      General: No scleral icterus  Neck:      Musculoskeletal: Neck supple     Cardiovascular:      Rate and Rhythm: Normal rate and regular rhythm  Heart sounds: Normal heart sounds  Pulmonary:      Effort: Pulmonary effort is normal       Breath sounds: Normal breath sounds  Lymphadenopathy:      Cervical: No cervical adenopathy  Skin:     General: Skin is warm and dry  Neurological:      Mental Status: He is alert and oriented to person, place, and time  Cranial Nerves: Cranial nerve deficit present  Comments: Positive hoarseness otherwise cranial nerves intact   Psychiatric:         Mood and Affect: Mood normal            BMI Counseling: Body mass index is 33 77 kg/m²  The BMI is above normal  Nutrition recommendations include moderation in carbohydrate intake and reducing intake of cholesterol  Exercise recommendations include exercising 3-5 times per week

## 2021-02-25 NOTE — ASSESSMENT & PLAN NOTE
Lab Results   Component Value Date    HGBA1C 6 3 (H) 08/14/2020    patient has appointment in office next month for office visit and blood work follow-up

## 2021-03-30 NOTE — PROGRESS NOTES
Speech-Language Pathology Initial Evaluation    Today's date: 3/30/2021  Patients name: Erick Cartwright  : 1975  MRN: [id number]  Safety measures: medication allergy  Referring provider: DO Kandi Zaragoza Diagnosis     ICD-10-CM    1  Other voice and resonance disorders  R49 8    2  Chronic hoarseness  R49 0 Ambulatory referral to Speech Therapy   3  Vocal cord nodule  J38 2 Ambulatory referral to Speech Therapy       Visit tracking:  -Referring provider: Epic  -Billing guidelines: CMS  -Visit #1 (30 PBP-- 10/1/2020-2021)  -Insurance: Brenda Salcedo (no auth required)  -RE due 2021    Subjective comments: Patient states doing well, has noted voice difficulties since November however reports they have been improving as of late    How did the patient hear about us? Physician    Patient's goal(s): to improve vocal quality    Reason for referral: Change in vocal quality  Prior functional status: Communication effective and appropriate in all situations  Clinically complex situations: N/A    History: Patient is a 51 y o  male who was referred to outpatient skilled Speech Therapy services for a voice evaluation  Patient presents with medical history of chronic hoarseness, vocal nodule, and allergic rhinitis, resulting in other voice/resonance disorders  Patient was previously able to communicate with vocal quality WFL however has noted reduced vocal quality past few months (since November)  Patient requires skilled SLP interventions at this time in order to assess current functional status, reduce risk of further decline, miscommunication and associated complications        Hearing: WFL  Vision: WFL    Home environment/lifestyle: lives with fiance  Highest level of education: some college, paramedic school   Vocational status: paramedic     Mental status: Alert  Behavior status: Cooperative  Communication modalities: Verbal  Rehabilitation prognosis: Good rehab potential to reach and maintain prior level of function    Assessments    VOICE EVALUATION:  -Chief complaint: "losing voice", soreness in throat     -Onset: Gradual (beginning: November), feels like it has improved as of late     -Voice history: Nodules     -Occupational/vocal demands: consistent demand of voice throughout work day     -Water intake:  2-3 bottles up to gallon, varies per day    -Caffeine intake: monster (2/day) , coffee, 2-4 cup/day    -Alcohol intake: 6 pack/week approximately    -Smoking?: no, no hx     -Throat clearing/coughing?: inconsistent throat clearing, does not notice increase with allergies     -Previous voice tx?:  No history     -Other History n/a      1  Voice Handicap Index (VHI): The VHI is a list of 30 statements that many people have used to describe their voices and the effects of their voices on their lives  Patient indicated how frequently she has the same experience using a rating point scale (never = 0, almost never = 1, sometimes = 2, almost always = 3, and always = 4)  Patient's results were as follows:    Subscale: Score: Self-Perceived Impairment Level:   Physical 12/40 Mild   Functional 2/40 Mild   Emotional 1/40 Mild        TOTAL 15/120 Mild       PERCEPTUAL VOICE ASSESSMENT:    2  Consensus Auditory Perceptual Evaluation of Voice (CAPE-V): The CAPE-V rates auditory-perceptual qualities of a patients voice  The overall severity, roughness, breathiness, strain, pitch and loudness are rated during several tasks including general conversation, vowel prolongation, and reading of sentences  Patient also read the [place] to assess the coordination of respiration and voice production  The ratings of the abovementioned parameters were plotted on a 100-millimeter scale, with 0 corresponding to typical normal voice and 100 indicating a deviant voice      Parameter: Rating: Severity & Perceptual Observations:   *Overall Severity Roughness 25/100 mild/mod   Roughness 25/100 mild/mod   Breathiness 10/100 Mild   Strain 10/100 Mild   Pitch 0/100 WFL   Loudness 0/100 WFL   Focus of Resonance pharyngeal/laryngeal Moderate       RESPIRATORY EFFICIENCY:   3  S:Z Ratio Task: Patient was instructed to sustain the sounds /s/ and /z/ across three trials to examine the coordination and efficiency of respiration and voice production  Normative data suggests that adults can prolong these sounds for 20-25 seconds  Ratios of 1 4 and above are consistent with laryngeal inefficiency, and ratios of 2 0 and above are suggestive of vocal fold pathology  Task: Trial 1 (sec): Trial 2 (sec): Trial 3 (sec):   /s/ 6 53 6 70 6 54   /z/ 6 41 9 00 11 22     Best /s/: 6 54  Best /z/: 11 22     Ratio:  58      4  Maximum Phonation Time: Patient was instructed to sustain the sound /ah/ across three trials to measure respiratory and laryngeal coordination and efficiency  Adults are typically able to prolong these vowels sound for 15-20 seconds  Reduced MPT may suggest poor respiratory support, or poor medial glottal closure  Trial 1 (sec): Trial 2 (sec): Trial 3 (sec):   9 33 10 44 8 04     Average Duration: 9 27 seconds      *Patient was educated on various vocal abuse behaviors and vocal hygiene throughout assessment  Vocal abuses included increased caffiene intake, reduced water intake, talking over background noise, straining voice  Vocal hygiene strategies included increased water intake reduced caffeine intake and additional vocal hygiene recommendations  Patient was agreeable  Goals    Short-term goals:  1  Patient will be educated on vocal hygiene and demonstrate understanding of recommendations to facilitate increased quality of voice, to be achieved in 4-6 weeks  2  Pt will utilize diaphragmatic breathing during oral sentence/paragraph reading in 80% of opportunities to facilitate increased breath support for voice/speaking, to be achieved in 4-6 weeks      3  Vocal quality during 1:1 conversation will improve over 4 weeks using the trained compensatory strategies, to be achieved in 4-6 weeks  4  Patient will complete vocal function exercises to increase vocal fold efficiency and endurance, to be achieved in 4-6 weeks  Long-term goals:  1  Patient will improve vocal quality as self reported by 80% improvement for increased functional communication by discharge  2  Patient will independently utilize trained strategies with 90% accuracy for improved vocal quality during oral reading tasks to facilitate increased generational skills into conversational speech by discharge  Impressions/Recommendations    Impressions:   - Patient presents mild to moderate voice difficulties as characterized by hoarse vocal quality, reduced MPT time indicative of reduced medial glottal closure or potentially reduced respiratory support, history of nodules, pharyngeal/laryngeal focus for resonance impacting vocal quality for communication tasks  Palpation of anterior portion of cervical area/ laryngeal area noted to reveal no increased patient reported tightness/tension in association with vocal quality  SLP provided education on vocal hygiene recommendations in order to facilitate improved vocal quality, patient in agreement at this time  Patient trialed with resonant voice tasks  SOVTE tasks and noted to be stimulable provided cues for improved vocal quality  Patient does have a high vocal demand during work/preferred tasks impacting communication accuracy as at times, patient reports he is asked to repeat himself especially when communicating via radio  Patient would benefit from skilled SLP interventions at this time in order to facilitate improved vocal quality, train in strategies to improve vocal quality/vocal hygiene recommendations for improved communication during functional work/preferred communication tasks           Recommendations:  -Patient would benefit from outpatient skilled Speech Therapy services: Voice therapy    -Frequency: 1-2x weekly  -Duration: 4-6 weeks    -Intervention certification from: 5/72/0377  -Intervention certification to: 5/88/6826

## 2021-03-31 ENCOUNTER — EVALUATION (OUTPATIENT)
Dept: SPEECH THERAPY | Facility: REHABILITATION | Age: 46
End: 2021-03-31
Payer: COMMERCIAL

## 2021-03-31 DIAGNOSIS — R49.0 CHRONIC HOARSENESS: ICD-10-CM

## 2021-03-31 DIAGNOSIS — J38.2 VOCAL CORD NODULE: ICD-10-CM

## 2021-03-31 DIAGNOSIS — R49.8 OTHER VOICE AND RESONANCE DISORDERS: Primary | ICD-10-CM

## 2021-03-31 PROCEDURE — 92524 BEHAVRAL QUALIT ANALYS VOICE: CPT

## 2021-04-21 ENCOUNTER — APPOINTMENT (OUTPATIENT)
Dept: LAB | Facility: CLINIC | Age: 46
End: 2021-04-21
Payer: COMMERCIAL

## 2021-04-21 DIAGNOSIS — R74.8 ALKALINE PHOSPHATASE ELEVATION: ICD-10-CM

## 2021-04-21 DIAGNOSIS — Z11.4 SCREENING FOR HIV (HUMAN IMMUNODEFICIENCY VIRUS): ICD-10-CM

## 2021-04-21 DIAGNOSIS — E66.9 OBESITY (BMI 30-39.9): ICD-10-CM

## 2021-04-21 DIAGNOSIS — E11.9 TYPE 2 DIABETES MELLITUS WITHOUT COMPLICATION, WITHOUT LONG-TERM CURRENT USE OF INSULIN (HCC): ICD-10-CM

## 2021-04-21 DIAGNOSIS — E78.5 HYPERLIPIDEMIA, UNSPECIFIED HYPERLIPIDEMIA TYPE: ICD-10-CM

## 2021-04-21 DIAGNOSIS — F52.32 DELAYED EJACULATION: ICD-10-CM

## 2021-04-21 LAB
ALBUMIN SERPL BCP-MCNC: 3.8 G/DL (ref 3.5–5)
ALP SERPL-CCNC: 94 U/L (ref 46–116)
ALT SERPL W P-5'-P-CCNC: 35 U/L (ref 12–78)
ANION GAP SERPL CALCULATED.3IONS-SCNC: 6 MMOL/L (ref 4–13)
AST SERPL W P-5'-P-CCNC: 22 U/L (ref 5–45)
BILIRUB SERPL-MCNC: 0.77 MG/DL (ref 0.2–1)
BILIRUB UR QL STRIP: NEGATIVE
BUN SERPL-MCNC: 16 MG/DL (ref 5–25)
CALCIUM SERPL-MCNC: 8.7 MG/DL (ref 8.3–10.1)
CHLORIDE SERPL-SCNC: 108 MMOL/L (ref 100–108)
CHOLEST SERPL-MCNC: 160 MG/DL (ref 50–200)
CLARITY UR: CLEAR
CO2 SERPL-SCNC: 25 MMOL/L (ref 21–32)
COLOR UR: YELLOW
CREAT SERPL-MCNC: 0.88 MG/DL (ref 0.6–1.3)
CREAT UR-MCNC: 111 MG/DL
ERYTHROCYTE [DISTWIDTH] IN BLOOD BY AUTOMATED COUNT: 12.9 % (ref 11.6–15.1)
EST. AVERAGE GLUCOSE BLD GHB EST-MCNC: 140 MG/DL
GFR SERPL CREATININE-BSD FRML MDRD: 102 ML/MIN/1.73SQ M
GLUCOSE P FAST SERPL-MCNC: 192 MG/DL (ref 65–99)
GLUCOSE UR STRIP-MCNC: ABNORMAL MG/DL
HBA1C MFR BLD: 6.5 %
HCT VFR BLD AUTO: 46.7 % (ref 36.5–49.3)
HDLC SERPL-MCNC: 40 MG/DL
HGB BLD-MCNC: 15.6 G/DL (ref 12–17)
HGB UR QL STRIP.AUTO: NEGATIVE
KETONES UR STRIP-MCNC: NEGATIVE MG/DL
LDLC SERPL CALC-MCNC: 92 MG/DL (ref 0–100)
LEUKOCYTE ESTERASE UR QL STRIP: NEGATIVE
MCH RBC QN AUTO: 31.2 PG (ref 26.8–34.3)
MCHC RBC AUTO-ENTMCNC: 33.4 G/DL (ref 31.4–37.4)
MCV RBC AUTO: 93 FL (ref 82–98)
MICROALBUMIN UR-MCNC: <5 MG/L (ref 0–20)
MICROALBUMIN/CREAT 24H UR: <5 MG/G CREATININE (ref 0–30)
NITRITE UR QL STRIP: NEGATIVE
PH UR STRIP.AUTO: 7 [PH]
PLATELET # BLD AUTO: 245 THOUSANDS/UL (ref 149–390)
PMV BLD AUTO: 10.7 FL (ref 8.9–12.7)
POTASSIUM SERPL-SCNC: 4.1 MMOL/L (ref 3.5–5.3)
PROT SERPL-MCNC: 6.8 G/DL (ref 6.4–8.2)
PROT UR STRIP-MCNC: NEGATIVE MG/DL
RBC # BLD AUTO: 5 MILLION/UL (ref 3.88–5.62)
SODIUM SERPL-SCNC: 139 MMOL/L (ref 136–145)
SP GR UR STRIP.AUTO: 1.02 (ref 1–1.03)
TESTOST SERPL-MCNC: 242 NG/DL (ref 113–1065)
TRIGL SERPL-MCNC: 142 MG/DL
TSH SERPL DL<=0.05 MIU/L-ACNC: 2.48 UIU/ML (ref 0.36–3.74)
UROBILINOGEN UR QL STRIP.AUTO: 1 E.U./DL
WBC # BLD AUTO: 7.68 THOUSAND/UL (ref 4.31–10.16)

## 2021-04-21 PROCEDURE — 83036 HEMOGLOBIN GLYCOSYLATED A1C: CPT

## 2021-04-21 PROCEDURE — 84403 ASSAY OF TOTAL TESTOSTERONE: CPT

## 2021-04-21 PROCEDURE — 84443 ASSAY THYROID STIM HORMONE: CPT

## 2021-04-21 PROCEDURE — 36415 COLL VENOUS BLD VENIPUNCTURE: CPT

## 2021-04-21 PROCEDURE — 85027 COMPLETE CBC AUTOMATED: CPT

## 2021-04-21 PROCEDURE — 80061 LIPID PANEL: CPT

## 2021-04-21 PROCEDURE — 80053 COMPREHEN METABOLIC PANEL: CPT

## 2021-04-21 PROCEDURE — 82570 ASSAY OF URINE CREATININE: CPT

## 2021-04-21 PROCEDURE — 82043 UR ALBUMIN QUANTITATIVE: CPT

## 2021-04-21 PROCEDURE — 81003 URINALYSIS AUTO W/O SCOPE: CPT

## 2021-04-21 PROCEDURE — 87389 HIV-1 AG W/HIV-1&-2 AB AG IA: CPT

## 2021-04-22 LAB — HIV 1+2 AB+HIV1 P24 AG SERPL QL IA: NORMAL

## 2021-05-06 ENCOUNTER — OFFICE VISIT (OUTPATIENT)
Dept: FAMILY MEDICINE CLINIC | Facility: CLINIC | Age: 46
End: 2021-05-06
Payer: COMMERCIAL

## 2021-05-06 VITALS
DIASTOLIC BLOOD PRESSURE: 74 MMHG | BODY MASS INDEX: 35.18 KG/M2 | WEIGHT: 259.38 LBS | TEMPERATURE: 97 F | HEART RATE: 60 BPM | SYSTOLIC BLOOD PRESSURE: 112 MMHG

## 2021-05-06 DIAGNOSIS — R74.8 ALKALINE PHOSPHATASE ELEVATION: ICD-10-CM

## 2021-05-06 DIAGNOSIS — E11.9 TYPE 2 DIABETES MELLITUS WITHOUT COMPLICATION, WITHOUT LONG-TERM CURRENT USE OF INSULIN (HCC): Primary | ICD-10-CM

## 2021-05-06 DIAGNOSIS — E66.01 SEVERE OBESITY (BMI 35.0-39.9) WITH COMORBIDITY (HCC): ICD-10-CM

## 2021-05-06 DIAGNOSIS — E78.00 PURE HYPERCHOLESTEROLEMIA: ICD-10-CM

## 2021-05-06 DIAGNOSIS — R63.5 WEIGHT GAIN FOLLOWING GASTRIC BYPASS SURGERY: ICD-10-CM

## 2021-05-06 DIAGNOSIS — R49.0 CHRONIC HOARSENESS: ICD-10-CM

## 2021-05-06 DIAGNOSIS — J30.9 ALLERGIC RHINITIS, UNSPECIFIED SEASONALITY, UNSPECIFIED TRIGGER: ICD-10-CM

## 2021-05-06 DIAGNOSIS — Z98.84 WEIGHT GAIN FOLLOWING GASTRIC BYPASS SURGERY: ICD-10-CM

## 2021-05-06 PROBLEM — E66.9 OBESITY (BMI 30-39.9): Status: RESOLVED | Noted: 2020-09-17 | Resolved: 2021-05-06

## 2021-05-06 PROCEDURE — 99214 OFFICE O/P EST MOD 30 MIN: CPT | Performed by: FAMILY MEDICINE

## 2021-05-06 NOTE — PROGRESS NOTES
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  Patient status post speech therapy          Problem List Items Addressed This Visit        Endocrine    Type 2 diabetes mellitus without complication, without long-term current use of insulin ([facility] [address] ) - Primary       Lab Results   Component Value Date    HGBA1C 6 5 (H) 04/21/2021     Stable metformin was reordered         Relevant Medications    metFORMIN (GLUCOPHAGE) 500 mg tablet    Other Relevant Orders    CBC    Comprehensive metabolic panel    Hemoglobin A1C    Lipid Panel with Direct LDL reflex    Microalbumin / creatinine urine ratio    TSH, 3rd generation with Free T4 reflex    UA (URINE) with reflex to Scope       Respiratory    Allergic rhinitis      Asymptomatic patient has stopped his Clarinex         Relevant Orders    CBC    Comprehensive metabolic panel    Hemoglobin A1C    Lipid Panel with Direct LDL reflex    Microalbumin / creatinine urine ratio    TSH, 3rd generation with Free T4 reflex    UA (URINE) with reflex to Scope       Other    Weight gain following gastric bypass surgery      Patient lost 2 lb diet exercise weight loss to continue         Relevant Orders    CBC    Comprehensive metabolic panel    Hemoglobin A1C    Lipid Panel with Direct LDL reflex    Microalbumin / creatinine urine ratio    TSH, 3rd generation with Free T4 reflex    UA (URINE) with reflex to Scope    Pure hypercholesterolemia      Diet controlled         Relevant Orders    CBC    Comprehensive metabolic panel    Hemoglobin A1C    Lipid Panel with Direct LDL reflex    Microalbumin / creatinine urine ratio    TSH, 3rd generation with Free T4 reflex    UA (URINE) with reflex to Scope    Severe obesity (BMI 35 0-39  9) with comorbidity (HCC)      BMI 35 18 patient lost 2 lb continue diet exercise weight loss         Relevant Orders    CBC    Comprehensive metabolic panel    Hemoglobin A1C    Lipid Panel with Direct LDL reflex    Microalbumin / creatinine urine ratio    TSH, 3rd generation with Free T4 reflex    UA (URINE) with reflex to Scope    RESOLVED: Alkaline phosphatase elevation      Alkaline phosphatase is normal         Relevant Orders    CBC    Comprehensive metabolic panel    Hemoglobin A1C    Lipid Panel with Direct LDL reflex    Microalbumin / creatinine urine ratio    TSH, 3rd generation with Free T4 reflex    UA (URINE) with reflex to Scope                 Diagnoses and all orders for this visit:    Type 2 diabetes mellitus without complication, without long-term current use of insulin ([facility])  -     metFORMIN (GLUCOPHAGE) 500 mg tablet; Take 1 tablet (500 mg total) by mouth 2 (two) times a day with meals  -     CBC; Future  -     Comprehensive metabolic panel; Future  -     Hemoglobin A1C; Future  -     Lipid Panel with Direct LDL reflex; Future  -     Microalbumin / creatinine urine ratio; Future  -     TSH, 3rd generation with Free T4 reflex; Future  -     UA (URINE) with reflex to Scope; Future    Allergic rhinitis, unspecified seasonality, unspecified trigger  -     CBC; Future  -     Comprehensive metabolic panel; Future  -     Hemoglobin A1C; Future  -     Lipid Panel with Direct LDL reflex; Future  -     Microalbumin / creatinine urine ratio; Future  -     TSH, 3rd generation with Free T4 reflex; Future  -     UA (URINE) with reflex to Scope; Future    Alkaline phosphatase elevation  -     CBC; Future  -     Comprehensive metabolic panel; Future  -     Hemoglobin A1C; Future  -     Lipid Panel with Direct LDL reflex;  Future  -     Microalbumin / creatinine urine ratio; Future  -     TSH, 3rd generation with Free T4 reflex; Future  -     UA (URINE) with reflex to Scope; Future    Pure hypercholesterolemia  -     CBC; Future  -     Comprehensive metabolic panel; Future  -     Hemoglobin A1C; Future  -     Lipid Panel with Direct LDL reflex; Future  -     Microalbumin / creatinine urine ratio; Future  -     TSH, 3rd generation with Free T4 reflex; Future  -     UA (URINE) with reflex to Scope; Future    Severe obesity (BMI 35 0-39  9) with comorbidity ([facility] [address] )  -     CBC; Future  -     Comprehensive metabolic panel; Future  -     Hemoglobin A1C; Future  -     Lipid Panel with Direct LDL reflex; Future  -     Microalbumin / creatinine urine ratio; Future  -     TSH, 3rd generation with Free T4 reflex; Future  -     UA (URINE) with reflex to Scope; Future    Weight gain following gastric bypass surgery  -     CBC; Future  -     Comprehensive metabolic panel; Future  -     Hemoglobin A1C; Future  -     Lipid Panel with Direct LDL reflex; Future  -     Microalbumin / creatinine urine ratio; Future  -     TSH, 3rd generation with Free T4 reflex; Future  -     UA (URINE) with reflex to Scope; Future              Subjective:      Patient ID: Randy Orellana is a 51 y o  male  CC:    Chief Complaint   Patient presents with    Follow-up     for chronic conditions  mgb       HPI:     Patient doing well  Must sugars are good [place] to [address] but knots not typical for the patient  Hemoglobin A1c is 6 5  The following portions of the patient's history were reviewed and updated as appropriate: allergies, current medications, past family history, past medical history, past social history, past surgical history and problem list       Review of Systems   Constitutional: Negative  HENT: Negative  Eyes: Negative  Respiratory: Negative  Cardiovascular: Negative  Gastrointestinal: Negative  Endocrine:        HPI   Genitourinary: Negative  Musculoskeletal: Negative  Skin: Negative  Allergic/Immunologic: Negative  Neurological: Negative  Hematological: Negative  Psychiatric/Behavioral: Negative  Data to review:       Objective:    Vitals:    05/06/21 1837   BP: 112/74   BP Location: Left arm   Patient Position: Sitting   Cuff Size: Large   Pulse: 60   Temp: (!) 97 °F (36 1 °C)   TempSrc: Temporal   Weight: 118 kg (259 lb 6 oz)        Physical Exam  Vitals signs and nursing note reviewed  Constitutional:       Appearance: Normal appearance  HENT:      Head: Normocephalic and atraumatic  Eyes:      General: No scleral icterus  Neck:      Musculoskeletal: Neck supple  Vascular: No carotid bruit  Cardiovascular:      Rate and Rhythm: Normal rate and regular rhythm  Heart sounds: Normal heart sounds  Pulmonary:      Effort: Pulmonary effort is normal       Breath sounds: Normal breath sounds  Abdominal:      General: Bowel sounds are normal       Palpations: Abdomen is soft  Tenderness: There is no abdominal tenderness  Musculoskeletal:      Right lower leg: No edema  Left lower leg: No edema  Skin:     General: Skin is warm and dry  Neurological:      General: No focal deficit present  Mental Status: He is alert     Psychiatric:         Mood and Affect: Mood normal

## 2021-05-25 NOTE — PROGRESS NOTES
Recommendations:  -Patient to be discharged from outpatient skilled Speech Therapy services: Patient has not returned to clinic for further treatment  If patient would like to resume therapy in the future, a new prescription will be required

## 2021-12-04 ENCOUNTER — APPOINTMENT (OUTPATIENT)
Dept: LAB | Facility: MEDICAL CENTER | Age: 46
End: 2021-12-04
Payer: COMMERCIAL

## 2021-12-04 DIAGNOSIS — E11.9 TYPE 2 DIABETES MELLITUS WITHOUT COMPLICATION, WITHOUT LONG-TERM CURRENT USE OF INSULIN (HCC): ICD-10-CM

## 2021-12-04 DIAGNOSIS — R63.5 WEIGHT GAIN FOLLOWING GASTRIC BYPASS SURGERY: ICD-10-CM

## 2021-12-04 DIAGNOSIS — E66.01 SEVERE OBESITY (BMI 35.0-39.9) WITH COMORBIDITY (HCC): ICD-10-CM

## 2021-12-04 DIAGNOSIS — Z98.84 WEIGHT GAIN FOLLOWING GASTRIC BYPASS SURGERY: ICD-10-CM

## 2021-12-04 DIAGNOSIS — E78.00 PURE HYPERCHOLESTEROLEMIA: ICD-10-CM

## 2021-12-04 DIAGNOSIS — J30.9 ALLERGIC RHINITIS, UNSPECIFIED SEASONALITY, UNSPECIFIED TRIGGER: ICD-10-CM

## 2021-12-04 DIAGNOSIS — R74.8 ALKALINE PHOSPHATASE ELEVATION: ICD-10-CM

## 2021-12-04 LAB
ALBUMIN SERPL BCP-MCNC: 3.8 G/DL (ref 3.5–5)
ALP SERPL-CCNC: 95 U/L (ref 46–116)
ALT SERPL W P-5'-P-CCNC: 35 U/L (ref 12–78)
ANION GAP SERPL CALCULATED.3IONS-SCNC: 5 MMOL/L (ref 4–13)
AST SERPL W P-5'-P-CCNC: 22 U/L (ref 5–45)
BILIRUB SERPL-MCNC: 1 MG/DL (ref 0.2–1)
BILIRUB UR QL STRIP: NEGATIVE
BUN SERPL-MCNC: 12 MG/DL (ref 5–25)
CALCIUM SERPL-MCNC: 9.4 MG/DL (ref 8.3–10.1)
CHLORIDE SERPL-SCNC: 108 MMOL/L (ref 100–108)
CHOLEST SERPL-MCNC: 145 MG/DL
CLARITY UR: CLEAR
CO2 SERPL-SCNC: 27 MMOL/L (ref 21–32)
COLOR UR: YELLOW
CREAT SERPL-MCNC: 0.93 MG/DL (ref 0.6–1.3)
CREAT UR-MCNC: 119 MG/DL
ERYTHROCYTE [DISTWIDTH] IN BLOOD BY AUTOMATED COUNT: 13.4 % (ref 11.6–15.1)
EST. AVERAGE GLUCOSE BLD GHB EST-MCNC: 137 MG/DL
GFR SERPL CREATININE-BSD FRML MDRD: 97 ML/MIN/1.73SQ M
GLUCOSE P FAST SERPL-MCNC: 127 MG/DL (ref 65–99)
GLUCOSE UR STRIP-MCNC: NEGATIVE MG/DL
HBA1C MFR BLD: 6.4 %
HCT VFR BLD AUTO: 47.9 % (ref 36.5–49.3)
HDLC SERPL-MCNC: 43 MG/DL
HGB BLD-MCNC: 15.6 G/DL (ref 12–17)
HGB UR QL STRIP.AUTO: NEGATIVE
KETONES UR STRIP-MCNC: NEGATIVE MG/DL
LDLC SERPL CALC-MCNC: 84 MG/DL (ref 0–100)
LEUKOCYTE ESTERASE UR QL STRIP: NEGATIVE
MCH RBC QN AUTO: 30.8 PG (ref 26.8–34.3)
MCHC RBC AUTO-ENTMCNC: 32.6 G/DL (ref 31.4–37.4)
MCV RBC AUTO: 95 FL (ref 82–98)
MICROALBUMIN UR-MCNC: 5.4 MG/L (ref 0–20)
MICROALBUMIN/CREAT 24H UR: 5 MG/G CREATININE (ref 0–30)
NITRITE UR QL STRIP: NEGATIVE
PH UR STRIP.AUTO: 6.5 [PH]
PLATELET # BLD AUTO: 271 THOUSANDS/UL (ref 149–390)
PMV BLD AUTO: 10.9 FL (ref 8.9–12.7)
POTASSIUM SERPL-SCNC: 4 MMOL/L (ref 3.5–5.3)
PROT SERPL-MCNC: 7 G/DL (ref 6.4–8.2)
PROT UR STRIP-MCNC: NEGATIVE MG/DL
RBC # BLD AUTO: 5.07 MILLION/UL (ref 3.88–5.62)
SODIUM SERPL-SCNC: 140 MMOL/L (ref 136–145)
SP GR UR STRIP.AUTO: 1.02 (ref 1–1.03)
TRIGL SERPL-MCNC: 89 MG/DL
TSH SERPL DL<=0.05 MIU/L-ACNC: 2.31 UIU/ML (ref 0.36–3.74)
UROBILINOGEN UR QL STRIP.AUTO: 1 E.U./DL
WBC # BLD AUTO: 10.34 THOUSAND/UL (ref 4.31–10.16)

## 2021-12-04 PROCEDURE — 36415 COLL VENOUS BLD VENIPUNCTURE: CPT

## 2021-12-04 PROCEDURE — 85027 COMPLETE CBC AUTOMATED: CPT

## 2021-12-04 PROCEDURE — 80061 LIPID PANEL: CPT

## 2021-12-04 PROCEDURE — 80053 COMPREHEN METABOLIC PANEL: CPT

## 2021-12-04 PROCEDURE — 82043 UR ALBUMIN QUANTITATIVE: CPT

## 2021-12-04 PROCEDURE — 82570 ASSAY OF URINE CREATININE: CPT

## 2021-12-04 PROCEDURE — 83036 HEMOGLOBIN GLYCOSYLATED A1C: CPT

## 2021-12-04 PROCEDURE — 81003 URINALYSIS AUTO W/O SCOPE: CPT

## 2021-12-04 PROCEDURE — 84443 ASSAY THYROID STIM HORMONE: CPT

## 2021-12-06 ENCOUNTER — OFFICE VISIT (OUTPATIENT)
Dept: FAMILY MEDICINE CLINIC | Facility: CLINIC | Age: 46
End: 2021-12-06
Payer: COMMERCIAL

## 2021-12-06 VITALS
HEIGHT: 72 IN | SYSTOLIC BLOOD PRESSURE: 100 MMHG | WEIGHT: 252 LBS | DIASTOLIC BLOOD PRESSURE: 80 MMHG | BODY MASS INDEX: 34.13 KG/M2 | HEART RATE: 72 BPM

## 2021-12-06 DIAGNOSIS — D72.829 LEUKOCYTOSIS, UNSPECIFIED TYPE: ICD-10-CM

## 2021-12-06 DIAGNOSIS — Z98.84 WEIGHT GAIN FOLLOWING GASTRIC BYPASS SURGERY: ICD-10-CM

## 2021-12-06 DIAGNOSIS — J30.9 ALLERGIC RHINITIS, UNSPECIFIED SEASONALITY, UNSPECIFIED TRIGGER: ICD-10-CM

## 2021-12-06 DIAGNOSIS — E11.9 TYPE 2 DIABETES MELLITUS WITHOUT COMPLICATION, WITHOUT LONG-TERM CURRENT USE OF INSULIN (HCC): Primary | ICD-10-CM

## 2021-12-06 DIAGNOSIS — R49.0 CHRONIC HOARSENESS: ICD-10-CM

## 2021-12-06 DIAGNOSIS — E78.00 PURE HYPERCHOLESTEROLEMIA: ICD-10-CM

## 2021-12-06 DIAGNOSIS — Z23 NEED FOR INFLUENZA VACCINATION: ICD-10-CM

## 2021-12-06 DIAGNOSIS — R63.5 WEIGHT GAIN FOLLOWING GASTRIC BYPASS SURGERY: ICD-10-CM

## 2021-12-06 DIAGNOSIS — E66.9 OBESITY (BMI 30-39.9): ICD-10-CM

## 2021-12-06 PROBLEM — E66.01 SEVERE OBESITY (BMI 35.0-39.9) WITH COMORBIDITY (HCC): Status: RESOLVED | Noted: 2021-05-06 | Resolved: 2021-12-06

## 2021-12-06 LAB
LEFT EYE DIABETIC RETINOPATHY: NORMAL
LEFT EYE IMAGE QUALITY: NORMAL
LEFT EYE MACULAR EDEMA: NORMAL
LEFT EYE OTHER RETINOPATHY: NORMAL
RIGHT EYE DIABETIC RETINOPATHY: NORMAL
RIGHT EYE IMAGE QUALITY: NORMAL
RIGHT EYE MACULAR EDEMA: NORMAL
RIGHT EYE OTHER RETINOPATHY: NORMAL
SEVERITY (EYE EXAM): NORMAL

## 2021-12-06 PROCEDURE — 99214 OFFICE O/P EST MOD 30 MIN: CPT | Performed by: FAMILY MEDICINE

## 2021-12-06 PROCEDURE — 92250 FUNDUS PHOTOGRAPHY W/I&R: CPT | Performed by: FAMILY MEDICINE

## 2022-01-03 ENCOUNTER — TELEPHONE (OUTPATIENT)
Dept: FAMILY MEDICINE CLINIC | Facility: CLINIC | Age: 47
End: 2022-01-03

## 2022-01-03 DIAGNOSIS — Z11.52 ENCOUNTER FOR SCREENING FOR COVID-19: Primary | ICD-10-CM

## 2022-01-03 PROCEDURE — U0003 INFECTIOUS AGENT DETECTION BY NUCLEIC ACID (DNA OR RNA); SEVERE ACUTE RESPIRATORY SYNDROME CORONAVIRUS 2 (SARS-COV-2) (CORONAVIRUS DISEASE [COVID-19]), AMPLIFIED PROBE TECHNIQUE, MAKING USE OF HIGH THROUGHPUT TECHNOLOGIES AS DESCRIBED BY CMS-2020-01-R: HCPCS | Performed by: FAMILY MEDICINE

## 2022-01-03 PROCEDURE — U0005 INFEC AGEN DETEC AMPLI PROBE: HCPCS | Performed by: FAMILY MEDICINE

## 2022-01-03 NOTE — TELEPHONE ENCOUNTER
COLD & SINUS SYMPTONS X 1 WEEK, SCRATCHY THROAT STARTED Thursday, FEVER LAST NIGHT  WORK NEEDS COVID TEST PLEASE  PLEASE LET PT KNOW WHEN ORDERED  THANK YOU  PT WORKS FOR [facility]  THANK YOU

## 2022-01-04 ENCOUNTER — TELEMEDICINE (OUTPATIENT)
Dept: FAMILY MEDICINE CLINIC | Facility: CLINIC | Age: 47
End: 2022-01-04
Payer: COMMERCIAL

## 2022-01-04 DIAGNOSIS — U07.1 COVID-19 VIRUS INFECTION: Primary | ICD-10-CM

## 2022-01-04 PROCEDURE — 99213 OFFICE O/P EST LOW 20 MIN: CPT | Performed by: NURSE PRACTITIONER

## 2022-01-04 NOTE — ASSESSMENT & PLAN NOTE
4Jan: Day 5  Patient's symptoms are mild at this point  I will follow-up with patient again on 01/06/2022

## 2022-01-04 NOTE — PATIENT INSTRUCTIONS
Problem List Items Addressed This Visit        Other    COVID-19 virus infection - Primary     4Jan: Day 5  Patient's symptoms are mild at this point  I will follow-up with patient again on 01/06/2022  [address]    Your healthcare provider and/or public health staff have evaluated you and have determined that you do not need to remain in the hospital at this time  At this time you can be isolated at home where you will be monitored by staff from your local or state health department  You should carefully follow the prevention and isolation steps below until a healthcare provider or local or state health department says that you can return to your normal activities  Stay home except to get medical care    People who are mildly ill with COVID-19 are able to isolate at home during their illness  You should restrict activities outside your home, except for getting medical care  Do not go to work, school, or public areas  Avoid using public transportation, ride-sharing, or taxis  Separate yourself from other people and animals in your home    People: As much as possible, you should stay in a specific room and away from other people in your home  Also, you should use a separate bathroom, if available  Animals: You should restrict contact with pets and other animals while you are sick with COVID-19, just like you would around other people  Although there have not been reports of pets or other animals becoming sick with COVID-19, it is still recommended that people sick with COVID-19 limit contact with animals until more information is known about the virus  When possible, have another member of your household care for your animals while you are sick  If you are sick with COVID-19, avoid contact with your pet, including petting, snuggling, being kissed or licked, and sharing food   If you must care for your pet or be around animals while you are sick, wash your hands before and after you interact with pets and wear a facemask  See COVID-19 and Animals for more information  Call ahead before visiting your doctor    If you have a medical appointment, call the healthcare provider and tell them that you have or may have COVID-19  This will help the healthcare providers office take steps to keep other people from getting infected or exposed  Wear a facemask    You should wear a facemask when you are around other people (e g , sharing a room or vehicle) or pets and before you enter a healthcare providers office  If you are not able to wear a facemask (for example, because it causes trouble breathing), then people who live with you should not stay in the same room with you, or they should wear a facemask if they enter your room  Cover your coughs and sneezes    Cover your mouth and nose with a tissue when you cough or sneeze  Throw used tissues in a lined trash can  Immediately wash your hands with soap and water for at least 20 seconds or, if soap and water are not available, clean your hands with an alcohol-based hand  that contains at least 60% alcohol  Clean your hands often    Wash your hands often with soap and water for at least 20 seconds, especially after blowing your nose, coughing, or sneezing; going to the bathroom; and before eating or preparing food  If soap and water are not readily available, use an alcohol-based hand  with at least 60% alcohol, covering all surfaces of your hands and rubbing them together until they feel dry  Soap and water are the best option if hands are visibly dirty  Avoid touching your eyes, nose, and mouth with unwashed hands  Avoid sharing personal household items    You should not share dishes, drinking glasses, cups, eating utensils, towels, or bedding with other people or pets in your home  After using these items, they should be washed thoroughly with soap and water      Clean all high-touch surfaces everyday    High touch surfaces include counters, tabletops, doorknobs, bathroom fixtures, toilets, phones, keyboards, tablets, and bedside tables  Also, clean any surfaces that may have blood, stool, or body fluids on them  Use a household cleaning spray or wipe, according to the label instructions  Labels contain instructions for safe and effective use of the cleaning product including precautions you should take when applying the product, such as wearing gloves and making sure you have good ventilation during use of the product  Monitor your symptoms    Seek prompt medical attention if your illness is worsening (e g , difficulty breathing)  Before seeking care, call your healthcare provider and tell them that you have, or are being evaluated for, COVID-19  Put on a facemask before you enter the facility  These steps will help the healthcare providers office to keep other people in the office or waiting room from getting infected or exposed  Ask your healthcare provider to call the local or [facility] health department  Persons who are placed under active monitoring or facilitated self-monitoring should follow instructions provided by their local health department or occupational health professionals, as appropriate  If you have a medical emergency and need to call 911, notify the dispatch personnel that you have, or are being evaluated for COVID-19  If possible, put on a facemask before emergency medical services arrive      Discontinuing home isolation    Patients with confirmed COVID-19 should remain under home isolation precautions until the following conditions are met:   - They have had no fever for at least 24 hours (that is one full day of no fever without the use medicine that reduces fevers)  AND  - other symptoms have improved (for example, when their cough or shortness of breath have improved)  AND  - If had mild or moderate illness, at least 10 days have passed since their symptoms first appeared or if severe illness (needed oxygen) or immunosuppressed, at least 20 days have passed since symptoms first appeared  Patients with confirmed COVID-19 should also notify close contacts (including their workplace) and ask that they self-quarantine  Currently, close contact is defined as being within 6 feet for 15 minutes or more from the period 24 hours starting 48 hours before symptom onset to the time at which the patient went into isolation  Close contacts of patients diagnosed with COVID-19 should be instructed by the patient to self-quarantine for 14 days from the last time of their last contact with the patient       Source: [employer]

## 2022-01-04 NOTE — PROGRESS NOTES
COVID-19 Outpatient Progress Note    Assessment/Plan:    Problem List Items Addressed This Visit        Other    COVID-19 virus infection - Primary     4Jan: Day 5  Patient's symptoms are mild at this point  I will follow-up with patient again on 01/06/2022  Disposition:     Patient already tested positive for COVID  I have spent 15 minutes directly with the patient  Encounter provider MAXX Hickman    Provider located at [address] [place] [id number] [phone]    Recent Visits  Date Type Provider Dept   01/03/22 Telephone [id number] Primary [facility]   Showing recent visits within past 7 days and meeting all other requirements  Today's Visits  Date Type Provider Dept   01/04/22 Telemedicine CharliRusty Giraldo  Primary Care   Showing today's visits and meeting all other requirements  Future Appointments  No visits were found meeting these conditions  Showing future appointments within next 150 days and meeting all other requirements     This virtual check-in was done via telephone and he agrees to proceed  Patient agrees to participate in a virtual check in via telephone or video visit instead of presenting to the office to address urgent/immediate medical needs  Patient is aware this is a billable service  After connecting through Telephone, the patient was identified by name and date of birth  Miriam Caballero was informed that this was a telemedicine visit and that the exam was being conducted confidentially over secure lines  My office door was closed  No one else was in the room  Miriam Caballero acknowledged consent and understanding of privacy and security of the telemedicine visit  I informed the patient that I have reviewed his record in Epic and presented the opportunity for him to ask any questions regarding the visit today  The patient agreed to participate      It was my intent to perform this visit via video technology but the patient was not able to do a video connection so the visit was completed via audio telephone only  Verification of patient location:  Patient is located in the following state in which I hold an active license: [place]    Subjective:   Marcos Kaba is a 49 y o  male who is concerned about COVID-19  Patient's symptoms include nasal congestion, rhinorrhea, sore throat and cough (non-productive )  Patient denies fever, chills, fatigue, malaise, anosmia, loss of taste, shortness of breath, chest tightness, abdominal pain, nausea, vomiting, diarrhea, myalgias and headaches  Date of symptom onset: 12/30/2021  COVID-19 vaccination status: Fully vaccinated with booster    Exposure:   Contact with a person who is under investigation (PUI) for or who is positive for COVID-19 within the last 14 days?: No    Hospitalized recently for fever and/or lower respiratory symptoms?: No      Currently a healthcare worker that is involved in direct patient care?: No      Works in a special setting where the risk of COVID-19 transmission may be high? (this may include long-term care, correctional and long-term facilities; homeless shelters; assisted-living facilities and group homes ): No      Resident in a special setting where the risk of COVID-19 transmission may be high? (this may include long-term care, correctional and long-term facilities; homeless shelters; assisted-living facilities and group homes ): No      Patient tested positive for [place] on 01/03/2022  Patient is currently reporting symptoms of intermittent nonproductive cough, rhinorrhea, nasal congestion, and sore throat  Patient denies any fevers or shortness of breath  Patient does report he has been taking Sudafed and this has been improving his congestion      Lab Results   Component Value Date    SARSCOV2 Positive (A) 01/03/2022     Past Medical History:   Diagnosis Date    Diabetes [facility])      Past Surgical History: Procedure Laterality Date    GASTRIC BYPASS  2008    gastric surgery for morbid obesity-last assessed-9/19/2017    VASECTOMY      surgery vas deferens-last assessed-9/19/2017     Current Outpatient Medications   Medication Sig Dispense Refill    metFORMIN (GLUCOPHAGE) 500 mg tablet Take 1 tablet (500 mg total) by mouth 2 (two) times a day with meals 180 tablet 3     No current facility-administered medications for this visit  Allergies   Allergen Reactions    Nsaids      Hx gastric bypass       Review of Systems   Constitutional: Negative for chills, fatigue and fever  HENT: Positive for congestion, rhinorrhea and sore throat  Eyes: Negative  Respiratory: Positive for cough (non-productive )  Negative for chest tightness and shortness of breath  Cardiovascular: Negative  Gastrointestinal: Negative for abdominal pain, diarrhea, nausea and vomiting  Endocrine: Negative  Genitourinary: Negative  Musculoskeletal: Negative for myalgias  Skin: Negative  Allergic/Immunologic: Negative  Neurological: Negative for headaches  Hematological: Negative  Psychiatric/Behavioral: Negative  Objective: There were no vitals filed for this visit  Physical Exam  Vitals reviewed: limited due to phone only exam    Neurological:      Mental Status: He is alert  VIRTUAL VISIT DISCLAIMER    Carmina Marilee verbally agrees to participate in [employer]  Pt is aware that [employer] could be limited without vital signs or the ability to perform a full hands-on physical Dorcus Mann understands he or the provider may request at any time to terminate the video visit and request the patient to seek care or treatment in person

## 2022-01-06 ENCOUNTER — TELEMEDICINE (OUTPATIENT)
Dept: FAMILY MEDICINE CLINIC | Facility: CLINIC | Age: 47
End: 2022-01-06
Payer: COMMERCIAL

## 2022-01-06 DIAGNOSIS — U07.1 COVID-19 VIRUS INFECTION: Primary | ICD-10-CM

## 2022-01-06 PROCEDURE — 99213 OFFICE O/P EST LOW 20 MIN: CPT | Performed by: NURSE PRACTITIONER

## 2022-01-06 NOTE — PATIENT INSTRUCTIONS
Problem List Items Addressed This Visit        Other    COVID-19 virus infection - Primary     6Jan: Day 7  Patient is released to return from isolation  No further follow-up is necessary  [address]    Your healthcare provider and/or public health staff have evaluated you and have determined that you do not need to remain in the hospital at this time  At this time you can be isolated at home where you will be monitored by staff from your local or state health department  You should carefully follow the prevention and isolation steps below until a healthcare provider or local or state health department says that you can return to your normal activities  Stay home except to get medical care    People who are mildly ill with COVID-19 are able to isolate at home during their illness  You should restrict activities outside your home, except for getting medical care  Do not go to work, school, or public areas  Avoid using public transportation, ride-sharing, or taxis  Separate yourself from other people and animals in your home    People: As much as possible, you should stay in a specific room and away from other people in your home  Also, you should use a separate bathroom, if available  Animals: You should restrict contact with pets and other animals while you are sick with COVID-19, just like you would around other people  Although there have not been reports of pets or other animals becoming sick with COVID-19, it is still recommended that people sick with COVID-19 limit contact with animals until more information is known about the virus  When possible, have another member of your household care for your animals while you are sick  If you are sick with COVID-19, avoid contact with your pet, including petting, snuggling, being kissed or licked, and sharing food   If you must care for your pet or be around animals while you are sick, wash your hands before and after you interact with pets and wear a facemask  See COVID-19 and Animals for more information  Call ahead before visiting your doctor    If you have a medical appointment, call the healthcare provider and tell them that you have or may have COVID-19  This will help the healthcare providers office take steps to keep other people from getting infected or exposed  Wear a facemask    You should wear a facemask when you are around other people (e g , sharing a room or vehicle) or pets and before you enter a healthcare providers office  If you are not able to wear a facemask (for example, because it causes trouble breathing), then people who live with you should not stay in the same room with you, or they should wear a facemask if they enter your room  Cover your coughs and sneezes    Cover your mouth and nose with a tissue when you cough or sneeze  Throw used tissues in a lined trash can  Immediately wash your hands with soap and water for at least 20 seconds or, if soap and water are not available, clean your hands with an alcohol-based hand  that contains at least 60% alcohol  Clean your hands often    Wash your hands often with soap and water for at least 20 seconds, especially after blowing your nose, coughing, or sneezing; going to the bathroom; and before eating or preparing food  If soap and water are not readily available, use an alcohol-based hand  with at least 60% alcohol, covering all surfaces of your hands and rubbing them together until they feel dry  Soap and water are the best option if hands are visibly dirty  Avoid touching your eyes, nose, and mouth with unwashed hands  Avoid sharing personal household items    You should not share dishes, drinking glasses, cups, eating utensils, towels, or bedding with other people or pets in your home  After using these items, they should be washed thoroughly with soap and water      Clean all high-touch surfaces everyday    High touch surfaces include counters, tabletops, doorknobs, bathroom fixtures, toilets, phones, keyboards, tablets, and bedside tables  Also, clean any surfaces that may have blood, stool, or body fluids on them  Use a household cleaning spray or wipe, according to the label instructions  Labels contain instructions for safe and effective use of the cleaning product including precautions you should take when applying the product, such as wearing gloves and making sure you have good ventilation during use of the product  Monitor your symptoms    Seek prompt medical attention if your illness is worsening (e g , difficulty breathing)  Before seeking care, call your healthcare provider and tell them that you have, or are being evaluated for, COVID-19  Put on a facemask before you enter the facility  These steps will help the healthcare providers office to keep other people in the office or waiting room from getting infected or exposed  Ask your healthcare provider to call the local or [facility] health department  Persons who are placed under active monitoring or facilitated self-monitoring should follow instructions provided by their local health department or occupational health professionals, as appropriate  If you have a medical emergency and need to call 911, notify the dispatch personnel that you have, or are being evaluated for COVID-19  If possible, put on a facemask before emergency medical services arrive      Discontinuing home isolation    Patients with confirmed COVID-19 should remain under home isolation precautions until the following conditions are met:   - They have had no fever for at least 24 hours (that is one full day of no fever without the use medicine that reduces fevers)  AND  - other symptoms have improved (for example, when their cough or shortness of breath have improved)  AND  - If had mild or moderate illness, at least 10 days have passed since their symptoms first appeared or if severe illness (needed oxygen) or immunosuppressed, at least 20 days have passed since symptoms first appeared  Patients with confirmed COVID-19 should also notify close contacts (including their workplace) and ask that they self-quarantine  Currently, close contact is defined as being within 6 feet for 15 minutes or more from the period 24 hours starting 48 hours before symptom onset to the time at which the patient went into isolation  Close contacts of patients diagnosed with COVID-19 should be instructed by the patient to self-quarantine for 14 days from the last time of their last contact with the patient       Source: [employer]

## 2022-01-06 NOTE — LETTER
January 6, 2022     Patient: Barbie Cruz   YOB: 1975   Date of Visit: 1/6/2022       To Whom it May Concern:    Barbie Cruz is under my professional care  He was seen in my office on 1/6/2022  He completed the necessary quarantine for COVID infection and is no longer considered contagious for the virus  Patient may return to work on 01/07/2022 with no restrictions  If you have any questions or concerns, please don't hesitate to call           Sincerely,          MAXX Torres        CC: No Recipients

## 2022-01-06 NOTE — PROGRESS NOTES
COVID-19 Outpatient Progress Note    Assessment/Plan:    Problem List Items Addressed This Visit        Other    COVID-19 virus infection - Primary     6Jan: Day 7  Patient is released to return from isolation  No further follow-up is necessary  Disposition:     Patient is released to return from isolation  I have spent 15 minutes directly with the patient  Encounter provider MAXX Gaviria    Provider located at [address]  [address] [facility] [address] [place] [id number] [phone]    Recent Visits  Date Type Provider Dept   01/04/22 Telemedicine MAXX [place] [facility]   01/03/22 Telephone [address] [facility]   Showing recent visits within past 7 days and meeting all other requirements  Today's Visits  Date Type Provider Dept   01/06/22 Telemedicine [facility]y [facility]   Showing today's visits and meeting all other requirements  Future Appointments  No visits were found meeting these conditions  Showing future appointments within next 150 days and meeting all other requirements     This virtual check-in was done via telephone and he agrees to proceed  Patient agrees to participate in a virtual check in via telephone or video visit instead of presenting to the office to address urgent/immediate medical needs  Patient is aware this is a billable service  After connecting through Telephone, the patient was identified by name and date of birth  Jennifer Longo was informed that this was a telemedicine visit and that the exam was being conducted confidentially over secure lines  My office door was closed  No one else was in the room  Jennifer Longo acknowledged consent and understanding of privacy and security of the telemedicine visit  I informed the patient that I have reviewed his record in Epic and presented the opportunity for him to ask any questions regarding the visit today   The patient agreed to participate  It was my intent to perform this visit via video technology but the patient was not able to do a video connection so the visit was completed via audio telephone only  Verification of patient location:  Patient is located in the following state in which I hold an active license: [place]    Subjective:   Barbie Cruz is a 49 y o  male who has been screened for COVID-19  Symptom change since last report: resolving  Patient's symptoms include nasal congestion, rhinorrhea, sore throat, cough (non-productive ) and myalgias  Patient denies fever, chills, fatigue, malaise, anosmia, loss of taste, shortness of breath, chest tightness, abdominal pain, nausea, vomiting, diarrhea and headaches  Date of positive COVID-19 PCR: 1/3/2022  COVID-19 vaccination status: Fully vaccinated with booster    Onel Ackerman has been staying home and has isolated themselves in his home  He is taking care to not share personal items and is cleaning all surfaces that are touched often, like counters, tabletops, and doorknobs using household cleaning sprays or wipes  He is wearing a mask when he leaves his room  Patient is reporting symptoms of intermittent nonproductive cough, rhinorrhea, nasal congestion, sore throat, and myalgias  Patient denies any fevers or shortness of breath  Since patient is fully vaccinated with the booster and it has been 7 days since his symptoms 1st began I will release the patient to return from isolation      Lab Results   Component Value Date    SARSCOV2 Positive (A) 01/03/2022     Past Medical History:   Diagnosis Date    Diabetes [facility])      Past Surgical History:   Procedure Laterality Date    GASTRIC BYPASS  2008    gastric surgery for morbid obesity-last assessed-9/19/2017    VASECTOMY      surgery vas deferens-last assessed-9/19/2017     Current Outpatient Medications   Medication Sig Dispense Refill    metFORMIN (GLUCOPHAGE) 500 mg tablet Take 1 tablet (500 mg total) by mouth 2 (two) times a day with meals 180 tablet 3     No current facility-administered medications for this visit  Allergies   Allergen Reactions    Nsaids      Hx gastric bypass       Review of Systems   Constitutional: Negative for chills, fatigue and fever  HENT: Positive for congestion, rhinorrhea and sore throat  Eyes: Negative  Respiratory: Positive for cough (non-productive )  Negative for chest tightness and shortness of breath  Cardiovascular: Negative  Gastrointestinal: Negative for abdominal pain, diarrhea, nausea and vomiting  Endocrine: Negative  Genitourinary: Negative  Musculoskeletal: Positive for myalgias  Skin: Negative  Allergic/Immunologic: Negative  Neurological: Negative for headaches  Hematological: Negative  Psychiatric/Behavioral: Negative  Objective: There were no vitals filed for this visit  Physical Exam  Vitals reviewed: limited due to phone only exam    Neurological:      Mental Status: He is alert  VIRTUAL VISIT DISCLAIMER    Jeannine Huber verbally agrees to participate in [facility]  Pt is aware that [facility] could be limited without vital signs or the ability to perform a full hands-on physical Verla Sean understands he or the provider may request at any time to terminate the video visit and request the patient to seek care or treatment in person

## 2022-01-18 ENCOUNTER — APPOINTMENT (OUTPATIENT)
Dept: LAB | Facility: CLINIC | Age: 47
End: 2022-01-18
Payer: COMMERCIAL

## 2022-01-18 DIAGNOSIS — D72.829 LEUKOCYTOSIS, UNSPECIFIED TYPE: ICD-10-CM

## 2022-01-18 LAB
BASOPHILS # BLD AUTO: 0.02 THOUSANDS/ΜL (ref 0–0.1)
BASOPHILS NFR BLD AUTO: 0 % (ref 0–1)
EOSINOPHIL # BLD AUTO: 0.09 THOUSAND/ΜL (ref 0–0.61)
EOSINOPHIL NFR BLD AUTO: 1 % (ref 0–6)
ERYTHROCYTE [DISTWIDTH] IN BLOOD BY AUTOMATED COUNT: 12.8 % (ref 11.6–15.1)
HCT VFR BLD AUTO: 44.1 % (ref 36.5–49.3)
HGB BLD-MCNC: 14.7 G/DL (ref 12–17)
IMM GRANULOCYTES # BLD AUTO: 0.02 THOUSAND/UL (ref 0–0.2)
IMM GRANULOCYTES NFR BLD AUTO: 0 % (ref 0–2)
LYMPHOCYTES # BLD AUTO: 1.66 THOUSANDS/ΜL (ref 0.6–4.47)
LYMPHOCYTES NFR BLD AUTO: 24 % (ref 14–44)
MCH RBC QN AUTO: 30.6 PG (ref 26.8–34.3)
MCHC RBC AUTO-ENTMCNC: 33.3 G/DL (ref 31.4–37.4)
MCV RBC AUTO: 92 FL (ref 82–98)
MONOCYTES # BLD AUTO: 0.59 THOUSAND/ΜL (ref 0.17–1.22)
MONOCYTES NFR BLD AUTO: 9 % (ref 4–12)
NEUTROPHILS # BLD AUTO: 4.5 THOUSANDS/ΜL (ref 1.85–7.62)
NEUTS SEG NFR BLD AUTO: 66 % (ref 43–75)
NRBC BLD AUTO-RTO: 0 /100 WBCS
PLATELET # BLD AUTO: 285 THOUSANDS/UL (ref 149–390)
PMV BLD AUTO: 11 FL (ref 8.9–12.7)
RBC # BLD AUTO: 4.8 MILLION/UL (ref 3.88–5.62)
WBC # BLD AUTO: 6.88 THOUSAND/UL (ref 4.31–10.16)

## 2022-01-18 PROCEDURE — 85025 COMPLETE CBC W/AUTO DIFF WBC: CPT

## 2022-01-18 PROCEDURE — 36415 COLL VENOUS BLD VENIPUNCTURE: CPT

## 2022-06-13 ENCOUNTER — APPOINTMENT (OUTPATIENT)
Dept: LAB | Facility: CLINIC | Age: 47
End: 2022-06-13
Payer: COMMERCIAL

## 2022-06-13 DIAGNOSIS — E78.00 PURE HYPERCHOLESTEROLEMIA: ICD-10-CM

## 2022-06-13 DIAGNOSIS — E11.9 TYPE 2 DIABETES MELLITUS WITHOUT COMPLICATION, WITHOUT LONG-TERM CURRENT USE OF INSULIN (HCC): ICD-10-CM

## 2022-06-13 DIAGNOSIS — R49.0 CHRONIC HOARSENESS: ICD-10-CM

## 2022-06-13 DIAGNOSIS — D72.829 LEUKOCYTOSIS, UNSPECIFIED TYPE: ICD-10-CM

## 2022-06-13 DIAGNOSIS — E66.9 OBESITY (BMI 30-39.9): ICD-10-CM

## 2022-06-13 DIAGNOSIS — J30.9 ALLERGIC RHINITIS, UNSPECIFIED SEASONALITY, UNSPECIFIED TRIGGER: ICD-10-CM

## 2022-06-13 DIAGNOSIS — Z98.84 WEIGHT GAIN FOLLOWING GASTRIC BYPASS SURGERY: ICD-10-CM

## 2022-06-13 DIAGNOSIS — R63.5 WEIGHT GAIN FOLLOWING GASTRIC BYPASS SURGERY: ICD-10-CM

## 2022-06-13 LAB
ALBUMIN SERPL BCP-MCNC: 3.7 G/DL (ref 3.5–5)
ALP SERPL-CCNC: 90 U/L (ref 46–116)
ALT SERPL W P-5'-P-CCNC: 40 U/L (ref 12–78)
ANION GAP SERPL CALCULATED.3IONS-SCNC: 7 MMOL/L (ref 4–13)
AST SERPL W P-5'-P-CCNC: 25 U/L (ref 5–45)
BILIRUB SERPL-MCNC: 0.77 MG/DL (ref 0.2–1)
BILIRUB UR QL STRIP: NEGATIVE
BUN SERPL-MCNC: 12 MG/DL (ref 5–25)
CALCIUM SERPL-MCNC: 9.2 MG/DL (ref 8.3–10.1)
CHLORIDE SERPL-SCNC: 110 MMOL/L (ref 100–108)
CHOLEST SERPL-MCNC: 153 MG/DL
CLARITY UR: CLEAR
CO2 SERPL-SCNC: 24 MMOL/L (ref 21–32)
COLOR UR: NORMAL
CREAT SERPL-MCNC: 0.88 MG/DL (ref 0.6–1.3)
CREAT UR-MCNC: 168 MG/DL
ERYTHROCYTE [DISTWIDTH] IN BLOOD BY AUTOMATED COUNT: 13.2 % (ref 11.6–15.1)
EST. AVERAGE GLUCOSE BLD GHB EST-MCNC: 143 MG/DL
GFR SERPL CREATININE-BSD FRML MDRD: 101 ML/MIN/1.73SQ M
GLUCOSE P FAST SERPL-MCNC: 163 MG/DL (ref 65–99)
GLUCOSE UR STRIP-MCNC: NEGATIVE MG/DL
HBA1C MFR BLD: 6.6 %
HCT VFR BLD AUTO: 46.1 % (ref 36.5–49.3)
HDLC SERPL-MCNC: 42 MG/DL
HGB BLD-MCNC: 15.5 G/DL (ref 12–17)
HGB UR QL STRIP.AUTO: NEGATIVE
KETONES UR STRIP-MCNC: NEGATIVE MG/DL
LDLC SERPL CALC-MCNC: 95 MG/DL (ref 0–100)
LEUKOCYTE ESTERASE UR QL STRIP: NEGATIVE
MCH RBC QN AUTO: 30.9 PG (ref 26.8–34.3)
MCHC RBC AUTO-ENTMCNC: 33.6 G/DL (ref 31.4–37.4)
MCV RBC AUTO: 92 FL (ref 82–98)
MICROALBUMIN UR-MCNC: 7.9 MG/L (ref 0–20)
MICROALBUMIN/CREAT 24H UR: 5 MG/G CREATININE (ref 0–30)
NITRITE UR QL STRIP: NEGATIVE
PH UR STRIP.AUTO: 5.5 [PH]
PLATELET # BLD AUTO: 278 THOUSANDS/UL (ref 149–390)
PMV BLD AUTO: 10.9 FL (ref 8.9–12.7)
POTASSIUM SERPL-SCNC: 4.2 MMOL/L (ref 3.5–5.3)
PROT SERPL-MCNC: 6.9 G/DL (ref 6.4–8.2)
PROT UR STRIP-MCNC: NEGATIVE MG/DL
RBC # BLD AUTO: 5.01 MILLION/UL (ref 3.88–5.62)
SODIUM SERPL-SCNC: 141 MMOL/L (ref 136–145)
SP GR UR STRIP.AUTO: 1.02 (ref 1–1.03)
TRIGL SERPL-MCNC: 78 MG/DL
TSH SERPL DL<=0.05 MIU/L-ACNC: 3.13 UIU/ML (ref 0.45–4.5)
UROBILINOGEN UR STRIP-ACNC: <2 MG/DL
WBC # BLD AUTO: 6.69 THOUSAND/UL (ref 4.31–10.16)

## 2022-06-13 PROCEDURE — 82043 UR ALBUMIN QUANTITATIVE: CPT

## 2022-06-13 PROCEDURE — 80061 LIPID PANEL: CPT

## 2022-06-13 PROCEDURE — 82570 ASSAY OF URINE CREATININE: CPT

## 2022-06-13 PROCEDURE — 84443 ASSAY THYROID STIM HORMONE: CPT

## 2022-06-13 PROCEDURE — 83036 HEMOGLOBIN GLYCOSYLATED A1C: CPT

## 2022-06-13 PROCEDURE — 85027 COMPLETE CBC AUTOMATED: CPT

## 2022-06-13 PROCEDURE — 36415 COLL VENOUS BLD VENIPUNCTURE: CPT

## 2022-06-13 PROCEDURE — 81003 URINALYSIS AUTO W/O SCOPE: CPT

## 2022-06-13 PROCEDURE — 80053 COMPREHEN METABOLIC PANEL: CPT

## 2022-06-20 ENCOUNTER — OFFICE VISIT (OUTPATIENT)
Dept: FAMILY MEDICINE CLINIC | Facility: CLINIC | Age: 47
End: 2022-06-20
Payer: COMMERCIAL

## 2022-06-20 ENCOUNTER — APPOINTMENT (OUTPATIENT)
Dept: RADIOLOGY | Facility: MEDICAL CENTER | Age: 47
End: 2022-06-20
Payer: COMMERCIAL

## 2022-06-20 VITALS
OXYGEN SATURATION: 95 % | HEART RATE: 80 BPM | BODY MASS INDEX: 34.05 KG/M2 | HEIGHT: 72 IN | DIASTOLIC BLOOD PRESSURE: 70 MMHG | SYSTOLIC BLOOD PRESSURE: 108 MMHG | WEIGHT: 251.4 LBS

## 2022-06-20 DIAGNOSIS — E78.00 PURE HYPERCHOLESTEROLEMIA: ICD-10-CM

## 2022-06-20 DIAGNOSIS — U07.1 COVID-19 VIRUS INFECTION: ICD-10-CM

## 2022-06-20 DIAGNOSIS — Z12.11 SCREENING FOR COLON CANCER: ICD-10-CM

## 2022-06-20 DIAGNOSIS — Z11.59 NEED FOR HEPATITIS C SCREENING TEST: ICD-10-CM

## 2022-06-20 DIAGNOSIS — E66.9 OBESITY (BMI 30-39.9): ICD-10-CM

## 2022-06-20 DIAGNOSIS — R06.00 DYSPNEA, UNSPECIFIED TYPE: ICD-10-CM

## 2022-06-20 DIAGNOSIS — J30.9 ALLERGIC RHINITIS, UNSPECIFIED SEASONALITY, UNSPECIFIED TRIGGER: ICD-10-CM

## 2022-06-20 DIAGNOSIS — E11.9 TYPE 2 DIABETES MELLITUS WITHOUT COMPLICATION, WITHOUT LONG-TERM CURRENT USE OF INSULIN (HCC): Primary | ICD-10-CM

## 2022-06-20 PROCEDURE — 71046 X-RAY EXAM CHEST 2 VIEWS: CPT

## 2022-06-20 PROCEDURE — 99214 OFFICE O/P EST MOD 30 MIN: CPT | Performed by: FAMILY MEDICINE

## 2022-06-20 PROCEDURE — 93000 ELECTROCARDIOGRAM COMPLETE: CPT | Performed by: FAMILY MEDICINE

## 2022-06-20 NOTE — ASSESSMENT & PLAN NOTE
Lab Results   Component Value Date    HGBA1C 6 6 (H) 06/13/2022   Foot exam completed   Increase metformin from 500 or 1000 mg twice daily

## 2022-06-20 NOTE — PATIENT INSTRUCTIONS
Increase metformin from 500 to a 1000 mg twice daily with meals   Continue diet exercise weight loss   Complete chest x-ray  Complete barium swallow  Complete pulmonary function testing  If episodes shortness of breath get worse please contact office  Complete colonoscopy for colon cancer screen  Return in 6 months for office visit blood work sooner if need

## 2022-06-20 NOTE — PROGRESS NOTES
Assessment and Plan:  1  Type 2 diabetes, hemoglobin A1c has risen 6 4 up to 6 6 fastings in the 150-160 range  Increase metformin from 500 to a 1000 twice daily  2  BMI 34 1 patient has lost 3 lb continue diet exercise weight loss  3  Hyperlipidemia , diet controlled  4  Per allergic rhinitis, asymptomatic  5  COVID, resolved   6  Dyspnea, very sporadically only in bed  Check EKG EKG normal chest x-ray will check barium swallow rule out silent reflux  PFT ordered  7  Anxiety, possible cause for above will monitor   8  Screening colon cancer refer for colonoscopy  9  Screening hepatitis-C blood work ordered  10  Return in 6 months for office visit blood work sooner if needed      Problem List Items Addressed This Visit        Endocrine    Type 2 diabetes mellitus without complication, without long-term current use of insulin ([facility] [address] ) - Primary       Lab Results   Component Value Date    HGBA1C 6 6 (H) 06/13/2022   Foot exam completed   Increase metformin from 500 or 1000 mg twice daily           Relevant Medications    metFORMIN (GLUCOPHAGE) 1000 MG tablet    Other Relevant Orders    CBC    Comprehensive metabolic panel    Hemoglobin A1C    Lipid Panel with Direct LDL reflex    Microalbumin / creatinine urine ratio    TSH, 3rd generation with Free T4 reflex       Respiratory    Allergic rhinitis    Relevant Orders    CBC    Comprehensive metabolic panel    Hemoglobin A1C    Lipid Panel with Direct LDL reflex    Microalbumin / creatinine urine ratio    TSH, 3rd generation with Free T4 reflex       Other    Pure hypercholesterolemia    Relevant Orders    CBC    Comprehensive metabolic panel    Hemoglobin A1C    Lipid Panel with Direct LDL reflex    Microalbumin / creatinine urine ratio    TSH, 3rd generation with Free T4 reflex    Obesity (BMI 30-39  9)    Relevant Orders    CBC    Comprehensive metabolic panel    Hemoglobin A1C    Lipid Panel with Direct LDL reflex    Microalbumin / creatinine urine ratio    TSH, 3rd generation with Free T4 reflex    Screening for colon cancer     Refer for colonoscopy           Relevant Orders    CBC    Comprehensive metabolic panel    Hemoglobin A1C    Lipid Panel with Direct LDL reflex    Microalbumin / creatinine urine ratio    TSH, 3rd generation with Free T4 reflex    Ambulatory referral for colonoscopy    RESOLVED: COVID-19 virus infection    Relevant Orders    CBC    Comprehensive metabolic panel    Hemoglobin A1C    Lipid Panel with Direct LDL reflex    Microalbumin / creatinine urine ratio    TSH, 3rd generation with Free T4 reflex      Other Visit Diagnoses     Need for hepatitis C screening test        Relevant Orders    Hepatitis C Antibody ([facility])    CBC    Comprehensive metabolic panel    Hemoglobin A1C    Lipid Panel with Direct LDL reflex    Microalbumin / creatinine urine ratio    TSH, 3rd generation with Free T4 reflex    Dyspnea, unspecified type        Relevant Orders    XR chest pa & lateral    FL barium swallow ROUTINE esophagus    CBC    Comprehensive metabolic panel    Hemoglobin A1C    Lipid Panel with Direct LDL reflex    Microalbumin / creatinine urine ratio    TSH, 3rd generation with Free T4 reflex    POCT ECG (Completed)    Complete PFT with post bronchodilator                 Diagnoses and all orders for this visit:    Type 2 diabetes mellitus without complication, without long-term current use of insulin (HCC)  -     metFORMIN (GLUCOPHAGE) 1000 MG tablet; Take 1 tablet (1,000 mg total) by mouth 2 (two) times a day with meals  -     CBC; Future  -     Comprehensive metabolic panel; Future  -     Hemoglobin A1C; Future  -     Lipid Panel with Direct LDL reflex; Future  -     Microalbumin / creatinine urine ratio; Future  -     TSH, 3rd generation with Free T4 reflex; Future    Need for hepatitis C screening test  -     Hepatitis C Antibody ([facility]); Future  -     CBC; Future  -     Comprehensive metabolic panel;  Future  -     Hemoglobin A1C; Future  -     Lipid Panel with Direct LDL reflex; Future  -     Microalbumin / creatinine urine ratio; Future  -     TSH, 3rd generation with Free T4 reflex; Future    Obesity (BMI 30-39 9)  -     CBC; Future  -     Comprehensive metabolic panel; Future  -     Hemoglobin A1C; Future  -     Lipid Panel with Direct LDL reflex; Future  -     Microalbumin / creatinine urine ratio; Future  -     TSH, 3rd generation with Free T4 reflex; Future    Pure hypercholesterolemia  -     CBC; Future  -     Comprehensive metabolic panel; Future  -     Hemoglobin A1C; Future  -     Lipid Panel with Direct LDL reflex; Future  -     Microalbumin / creatinine urine ratio; Future  -     TSH, 3rd generation with Free T4 reflex; Future    Allergic rhinitis, unspecified seasonality, unspecified trigger  -     CBC; Future  -     Comprehensive metabolic panel; Future  -     Hemoglobin A1C; Future  -     Lipid Panel with Direct LDL reflex; Future  -     Microalbumin / creatinine urine ratio; Future  -     TSH, 3rd generation with Free T4 reflex; Future    COVID-19 virus infection  -     CBC; Future  -     Comprehensive metabolic panel; Future  -     Hemoglobin A1C; Future  -     Lipid Panel with Direct LDL reflex; Future  -     Microalbumin / creatinine urine ratio; Future  -     TSH, 3rd generation with Free T4 reflex; Future    Screening for colon cancer  -     CBC; Future  -     Comprehensive metabolic panel; Future  -     Hemoglobin A1C; Future  -     Lipid Panel with Direct LDL reflex; Future  -     Microalbumin / creatinine urine ratio; Future  -     TSH, 3rd generation with Free T4 reflex; Future  -     Ambulatory referral for colonoscopy; Future    Dyspnea, unspecified type  -     XR chest pa & lateral; Future  -     FL barium swallow ROUTINE esophagus; Future  -     CBC; Future  -     Comprehensive metabolic panel; Future  -     Hemoglobin A1C; Future  -     Lipid Panel with Direct LDL reflex;  Future  -     Microalbumin / creatinine urine ratio; Future  -     TSH, 3rd generation with Free T4 reflex; Future  -     POCT ECG  -     Complete PFT with post bronchodilator; Future            Subjective:      Patient ID: Cadence Lopez is a 49 y o  male  CC:    Chief Complaint   Patient presents with    Follow-up     Pt here for a follow up and to review lab results  Rcruz       HPI:    Fasting sugars have been between 150 and 160  Seem little bit higher  Hemoglobin A1c is slightly risen from 6 4 up to 6 6  Will increase metformin from 500 b i d  to a 1000 b i d   Patient states about once a month when he is lying in bed he feels acute short of breath he gets up walks around and it resolves  Patient feels that is anxiety  The following portions of the patient's history were reviewed and updated as appropriate: allergies, current medications, past family history, past medical history, past social history, past surgical history and problem list       Review of Systems   Constitutional: Negative  HENT: Negative  Eyes: Negative  Respiratory:        HPI   Cardiovascular: Negative  Gastrointestinal: Negative  Endocrine:        HPI   Genitourinary: Negative  Musculoskeletal: Negative  Skin: Negative  Allergic/Immunologic: Negative  Neurological: Negative  Hematological: Negative  Psychiatric/Behavioral:        HPI         Data to review:       Objective:    Vitals:    06/20/22 0827 06/20/22 0846   BP: 108/70    BP Location: Left arm    Patient Position: Sitting    Cuff Size: Large    Pulse: 80    SpO2:  95%   Weight: 114 kg (251 lb 6 4 oz)    Height: 6' (1 829 m)         Physical Exam  Vitals and nursing note reviewed  Constitutional:       Appearance: Normal appearance  HENT:      Head: Normocephalic and atraumatic  Eyes:      General: No scleral icterus  Neck:      Vascular: No carotid bruit  Cardiovascular:      Rate and Rhythm: Normal rate and regular rhythm  Pulses: Normal pulses   no weak pulses Dorsalis pedis pulses are 2+ on the right side and 2+ on the left side  Posterior tibial pulses are 2+ on the right side and 2+ on the left side  Heart sounds: Normal heart sounds  Pulmonary:      Effort: Pulmonary effort is normal       Breath sounds: Normal breath sounds  Abdominal:      General: Bowel sounds are normal       Palpations: Abdomen is soft  Tenderness: There is no abdominal tenderness  There is no guarding  Musculoskeletal:      Cervical back: Neck supple  Right lower leg: No edema  Left lower leg: No edema  Feet:      Right foot:      Skin integrity: No ulcer, skin breakdown, erythema, warmth, callus or dry skin  Left foot:      Skin integrity: No ulcer, skin breakdown, erythema, warmth, callus or dry skin  Skin:     General: Skin is warm and dry  Neurological:      General: No focal deficit present  Mental Status: He is alert  Psychiatric:         Mood and Affect: Mood normal           Patient's shoes and socks removed  Right Foot/Ankle   Right Foot Inspection  Skin Exam: skin normal and skin intact  No dry skin, no warmth, no callus, no erythema, no maceration, no abnormal color, no pre-ulcer, no ulcer and no callus  Toe Exam: ROM and strength within normal limits  Sensory   Vibration: intact  Proprioception: intact  Monofilament testing: intact    Vascular  Capillary refills: < 3 seconds  The right DP pulse is 2+  The right PT pulse is 2+  Right Toe  - Comprehensive Exam  Arch: normal  Hammertoes: absent  Claw Toes: absent  Swelling: none   Tenderness: none         Left Foot/Ankle  Left Foot Inspection  Skin Exam: skin normal and skin intact  No dry skin, no warmth, no erythema, no maceration, normal color, no pre-ulcer, no ulcer and no callus  Toe Exam: ROM and strength within normal limits       Sensory   Vibration: intact  Proprioception: intact  Monofilament testing: intact    Vascular  Capillary refills: < 3 seconds  The left DP pulse is 2+  The left PT pulse is 2+  Left Toe  - Comprehensive Exam  Arch: normal  Hammertoes: absent  Claw toes: absent  Swelling: none   Tenderness: none           Assign Risk Category  No deformity present  No loss of protective sensation  No weak pulses  Risk: 0      BMI Counseling: Body mass index is 34 1 kg/m²  The BMI is above normal  Nutrition recommendations include moderation in carbohydrate intake and reducing intake of cholesterol  Rationale for BMI follow-up plan is due to patient being overweight or obese  Depression Screening and Follow-up Plan: Patient was screened for depression during today's encounter  They screened negative with a PHQ-2 score of 0

## 2022-06-27 ENCOUNTER — HOSPITAL ENCOUNTER (OUTPATIENT)
Dept: RADIOLOGY | Facility: HOSPITAL | Age: 47
Discharge: HOME | End: 2022-06-27
Payer: COMMERCIAL

## 2022-06-27 DIAGNOSIS — R06.00 DYSPNEA, UNSPECIFIED TYPE: ICD-10-CM

## 2022-06-27 PROCEDURE — 74220 X-RAY XM ESOPHAGUS 1CNTRST: CPT

## 2022-08-08 ENCOUNTER — HOSPITAL ENCOUNTER (OUTPATIENT)
Dept: PULMONOLOGY | Facility: HOSPITAL | Age: 47
Discharge: HOME | End: 2022-08-08
Payer: COMMERCIAL

## 2022-08-08 DIAGNOSIS — R06.00 DYSPNEA, UNSPECIFIED TYPE: ICD-10-CM

## 2022-08-08 PROCEDURE — 94729 DIFFUSING CAPACITY: CPT | Performed by: INTERNAL MEDICINE

## 2022-08-08 PROCEDURE — 94726 PLETHYSMOGRAPHY LUNG VOLUMES: CPT | Performed by: INTERNAL MEDICINE

## 2022-08-08 PROCEDURE — 94760 N-INVAS EAR/PLS OXIMETRY 1: CPT

## 2022-08-08 PROCEDURE — 94060 EVALUATION OF WHEEZING: CPT | Performed by: INTERNAL MEDICINE

## 2022-08-08 PROCEDURE — 94726 PLETHYSMOGRAPHY LUNG VOLUMES: CPT

## 2022-08-08 PROCEDURE — 94060 EVALUATION OF WHEEZING: CPT

## 2022-08-08 PROCEDURE — 94729 DIFFUSING CAPACITY: CPT

## 2022-08-08 RX ORDER — ALBUTEROL SULFATE 2.5 MG/3ML
2.5 SOLUTION RESPIRATORY (INHALATION) ONCE AS NEEDED
Status: COMPLETED | OUTPATIENT
Start: 2022-08-08 | End: 2022-08-08

## 2022-08-08 RX ADMIN — ALBUTEROL SULFATE 2.5 MG: 2.5 SOLUTION RESPIRATORY (INHALATION) at 09:46

## 2022-08-12 ENCOUNTER — TELEPHONE (OUTPATIENT)
Dept: FAMILY MEDICINE CLINIC | Facility: CLINIC | Age: 47
End: 2022-08-12

## 2022-08-12 DIAGNOSIS — R06.02 SHORTNESS OF BREATH: Primary | ICD-10-CM

## 2022-08-12 NOTE — TELEPHONE ENCOUNTER
----- Message from Matilda Davidson DO sent at 8/12/2022  7:12 AM EDT -----  Pulmonary function testing was normal

## 2022-08-12 NOTE — TELEPHONE ENCOUNTER
Pt informed about NORMAL PFT results, understand and will follow up still having SOB     PLEASE ADVICE

## 2022-08-16 ENCOUNTER — TELEPHONE (OUTPATIENT)
Dept: PULMONOLOGY | Facility: CLINIC | Age: 47
End: 2022-08-16

## 2022-08-16 NOTE — TELEPHONE ENCOUNTER
Lvm for pt to schedule a consult appt per referral placed by PCP @ our [address] office  Please offer pt Dr Betty Pina 08/23, 08/24, 08/25 or first available

## 2022-10-11 ENCOUNTER — CONSULT (OUTPATIENT)
Dept: PULMONOLOGY | Facility: CLINIC | Age: 47
End: 2022-10-11
Payer: COMMERCIAL

## 2022-10-11 VITALS
BODY MASS INDEX: 33.86 KG/M2 | DIASTOLIC BLOOD PRESSURE: 70 MMHG | WEIGHT: 250 LBS | SYSTOLIC BLOOD PRESSURE: 128 MMHG | OXYGEN SATURATION: 96 % | HEART RATE: 70 BPM | HEIGHT: 72 IN | RESPIRATION RATE: 18 BRPM

## 2022-10-11 DIAGNOSIS — E66.09 CLASS 1 OBESITY DUE TO EXCESS CALORIES WITHOUT SERIOUS COMORBIDITY WITH BODY MASS INDEX (BMI) OF 33.0 TO 33.9 IN ADULT: ICD-10-CM

## 2022-10-11 DIAGNOSIS — J30.89 NON-SEASONAL ALLERGIC RHINITIS, UNSPECIFIED TRIGGER: ICD-10-CM

## 2022-10-11 DIAGNOSIS — R06.02 SHORTNESS OF BREATH: Primary | ICD-10-CM

## 2022-10-11 DIAGNOSIS — G47.33 OSA (OBSTRUCTIVE SLEEP APNEA): ICD-10-CM

## 2022-10-11 PROBLEM — Z12.11 SCREENING FOR COLON CANCER: Status: RESOLVED | Noted: 2022-06-20 | Resolved: 2022-10-11

## 2022-10-11 PROCEDURE — 99244 OFF/OP CNSLTJ NEW/EST MOD 40: CPT | Performed by: INTERNAL MEDICINE

## 2022-10-11 NOTE — PROGRESS NOTES
Pulmonary Consultation   Samuel Monday 49 y o  male MRN: [id number]    Encounter: [id number]      Reason for consultation:   Dyspnea    Requesting physician:  Scott Samayoa DO       Impressions:   · Dyspnea  This is nonspecific  Probably related to obstructive sleep apnea  · Obesity  · Obstructive sleep apnea  Recommendations:  · Sleep study  · Follow-up as needed  Discussion:  The patient's dyspnea the happens at night is nonspecific  It is probably due to obstructive sleep apnea  The patient has history of sleep apnea and was using CPAP prior to his gastric bypass surgery  He was never tested again before the CPAP was stopped  I have ordered a home sleep study  The patient is PFTs are normal   The chest x-ray done in June also was unremarkable  I have reassured the patient that he does not have a lung disease  I have not scheduled him for another appointment however if his sleep study confirms obstructive sleep apnea I told him to call and we will arrange for follow-up  History of Present Illness   HPI:   Monday is a 49 y o  male who is here for evaluation of dyspnea  The patient stated that for the last 3 years he gets episodes of shortness of breath while he is asleep  It wakes him up from his sleep  He gets out of bed and walks around and then he gets back to sleep  It happens on average probably 2 to 3 times a week  He denies shortness of breath during the day  He has no cough, wheezing or sputum production  Denies any chest pain or palpitations  The patient had morbid obesity  He had gastric bypass done in the past   He lost about 90 lb  He had obstructive sleep apnea was on CPAP  His CPAP was stop by the bariatric surgeon after the gastric bypass surgery  Review of systems:  12 point review of systems was completed and was otherwise negative except as listed in HPI        Historical Information   Past Medical History:   Diagnosis Date   • Allergic rhinitis    • Diabetes ([facility] [address] )    • Diabetes mellitus ([facility] [address] )      Past Surgical History:   Procedure Laterality Date   • GASTRIC BYPASS  2008    gastric surgery for morbid obesity-last assessed-9/19/2017   • VASECTOMY      surgery vas deferens-last assessed-9/19/2017     Family History   Problem Relation Age of Onset   • Leukemia Mother    • Cancer Mother    • Heart attack Father         acute MI, initial episode of care   • Depression Father         with anxiety   • Stroke Father         CVA   • Hypertension Father    • Diabetes type I Father        Family History:  No family history of chronic lung disease  Social History:  The patient is  and lives with his wife  Lifelong nonsmoker  He works as a paramedic  Meds/Allergies   No current facility-administered medications for this visit  (Not in a hospital admission)    Allergies   Allergen Reactions   • Nsaids      Hx gastric bypass       Vitals: Blood pressure 128/70, pulse 70, resp  rate 18, height 6' (1 829 m), weight 113 kg (250 lb), SpO2 96 %  ,      Physical exam:        Head/eyes:    Normocephalic, without obvious abnormality, atraumatic,         PERRL, extraocular muscles intact, no scleral icterus    Nose:   Nares normal, septum midline, mucosa normal, no drainage    or sinus tenderness   Throat:   Moist mucous membranes, no thrush   Neck:   Supple, trachea midline, no adenopathy; no carotid    bruit or JVD   Lungs:     Clear breath sounds  No wheezing or rhonchi          Heart:    Regular rate and rhythm, S1 and S2 normal, no murmur, rub   or gallop   Abdomen:     Soft, non-tender, bowel sounds active all four quadrants,     no masses, no organomegaly   Extremities:   Extremities normal, atraumatic, no cyanosis or edema   Skin:   Warm, dry, turgor normal, no rashes or lesions   Neurologic:   CNII-XII intact, normal strength, non-focal             Imaging and other studies: I have personally reviewed pertinent films in PACS chest x-rays reviewed on the [facility] system  It showed no acute or chronic findings  Complete pulmonary function test is reviewed  It showed normal lung volumes  Normal airflow on vital capacity  Normal diffusion capacity  Normal airway resistance as indicated by the specific airway conductance      Lab Results   Component Value Date    WBC 6 69 06/13/2022    HGB 15 5 06/13/2022    HCT 46 1 06/13/2022    MCV 92 06/13/2022     06/13/2022     Lab Results   Component Value Date    SODIUM 141 06/13/2022    K 4 2 06/13/2022     (H) 06/13/2022    CO2 24 06/13/2022    BUN 12 06/13/2022    CREATININE 0 88 06/13/2022    CALCIUM 9 2 06/13/2022             Corie Webb Alert

## 2022-11-02 ENCOUNTER — TELEPHONE (OUTPATIENT)
Dept: SLEEP CENTER | Facility: CLINIC | Age: 47
End: 2022-11-02

## 2022-12-12 ENCOUNTER — HOSPITAL ENCOUNTER (OUTPATIENT)
Dept: SLEEP CENTER | Facility: CLINIC | Age: 47
Discharge: HOME | End: 2022-12-12

## 2022-12-12 DIAGNOSIS — G47.33 OSA (OBSTRUCTIVE SLEEP APNEA): ICD-10-CM

## 2022-12-13 NOTE — PROGRESS NOTES
Home Sleep Study Documentation    HOME STUDY DEVICE: Noxturnal yes                                           Maggi G3 no      Pre-Sleep Home Study:    Set-up and instructions performed by:  LINDA Yang    Technician performed demonstration for Patient: yes    Return demonstration performed by Patient: yes    Written instructions provided to Patient: yes    Patient signed consent form: yes        Post-Sleep Home Study:    Additional comments by Patient: None    Home Sleep Study Failed:no:    Failure reason: N/A    Reported or Detected: N/A    Scored by: DEVIN Drummond

## 2022-12-15 DIAGNOSIS — G47.33 OSA (OBSTRUCTIVE SLEEP APNEA): Primary | ICD-10-CM

## 2022-12-19 ENCOUNTER — APPOINTMENT (OUTPATIENT)
Dept: LAB | Facility: CLINIC | Age: 47
End: 2022-12-19

## 2022-12-19 DIAGNOSIS — J30.9 ALLERGIC RHINITIS, UNSPECIFIED SEASONALITY, UNSPECIFIED TRIGGER: ICD-10-CM

## 2022-12-19 DIAGNOSIS — U07.1 COVID-19 VIRUS INFECTION: ICD-10-CM

## 2022-12-19 DIAGNOSIS — E11.9 TYPE 2 DIABETES MELLITUS WITHOUT COMPLICATION, WITHOUT LONG-TERM CURRENT USE OF INSULIN (HCC): ICD-10-CM

## 2022-12-19 DIAGNOSIS — R06.00 DYSPNEA, UNSPECIFIED TYPE: ICD-10-CM

## 2022-12-19 DIAGNOSIS — E66.9 OBESITY (BMI 30-39.9): ICD-10-CM

## 2022-12-19 DIAGNOSIS — Z12.11 SCREENING FOR COLON CANCER: ICD-10-CM

## 2022-12-19 DIAGNOSIS — E78.00 PURE HYPERCHOLESTEROLEMIA: ICD-10-CM

## 2022-12-19 DIAGNOSIS — Z11.59 NEED FOR HEPATITIS C SCREENING TEST: ICD-10-CM

## 2022-12-19 PROBLEM — F52.32 DELAYED EJACULATION: Status: RESOLVED | Noted: 2020-09-17 | Resolved: 2022-12-19

## 2022-12-19 LAB
ALBUMIN SERPL BCP-MCNC: 4.1 G/DL (ref 3.5–5)
ALP SERPL-CCNC: 87 U/L (ref 46–116)
ALT SERPL W P-5'-P-CCNC: 28 U/L (ref 12–78)
ANION GAP SERPL CALCULATED.3IONS-SCNC: 7 MMOL/L (ref 4–13)
AST SERPL W P-5'-P-CCNC: 20 U/L (ref 5–45)
BILIRUB SERPL-MCNC: 0.84 MG/DL (ref 0.2–1)
BUN SERPL-MCNC: 17 MG/DL (ref 5–25)
CALCIUM SERPL-MCNC: 9.2 MG/DL (ref 8.3–10.1)
CHLORIDE SERPL-SCNC: 107 MMOL/L (ref 96–108)
CHOLEST SERPL-MCNC: 169 MG/DL
CO2 SERPL-SCNC: 26 MMOL/L (ref 21–32)
CREAT SERPL-MCNC: 0.87 MG/DL (ref 0.6–1.3)
CREAT UR-MCNC: 181 MG/DL
ERYTHROCYTE [DISTWIDTH] IN BLOOD BY AUTOMATED COUNT: 13.2 % (ref 11.6–15.1)
EST. AVERAGE GLUCOSE BLD GHB EST-MCNC: 134 MG/DL
GFR SERPL CREATININE-BSD FRML MDRD: 102 ML/MIN/1.73SQ M
GLUCOSE P FAST SERPL-MCNC: 121 MG/DL (ref 65–99)
HBA1C MFR BLD: 6.3 %
HCT VFR BLD AUTO: 48.9 % (ref 36.5–49.3)
HCV AB SER QL: NORMAL
HDLC SERPL-MCNC: 50 MG/DL
HGB BLD-MCNC: 15.8 G/DL (ref 12–17)
LDLC SERPL CALC-MCNC: 100 MG/DL (ref 0–100)
MCH RBC QN AUTO: 30.3 PG (ref 26.8–34.3)
MCHC RBC AUTO-ENTMCNC: 32.3 G/DL (ref 31.4–37.4)
MCV RBC AUTO: 94 FL (ref 82–98)
MICROALBUMIN UR-MCNC: 10.8 MG/L (ref 0–20)
MICROALBUMIN/CREAT 24H UR: 6 MG/G CREATININE (ref 0–30)
PLATELET # BLD AUTO: 315 THOUSANDS/UL (ref 149–390)
PMV BLD AUTO: 10.7 FL (ref 8.9–12.7)
POTASSIUM SERPL-SCNC: 4.2 MMOL/L (ref 3.5–5.3)
PROT SERPL-MCNC: 6.9 G/DL (ref 6.4–8.4)
RBC # BLD AUTO: 5.22 MILLION/UL (ref 3.88–5.62)
SODIUM SERPL-SCNC: 140 MMOL/L (ref 135–147)
TRIGL SERPL-MCNC: 95 MG/DL
TSH SERPL DL<=0.05 MIU/L-ACNC: 2.74 UIU/ML (ref 0.45–4.5)
WBC # BLD AUTO: 6.46 THOUSAND/UL (ref 4.31–10.16)

## 2022-12-21 ENCOUNTER — OFFICE VISIT (OUTPATIENT)
Dept: PULMONOLOGY | Facility: CLINIC | Age: 47
End: 2022-12-21

## 2022-12-21 VITALS
OXYGEN SATURATION: 96 % | DIASTOLIC BLOOD PRESSURE: 78 MMHG | SYSTOLIC BLOOD PRESSURE: 120 MMHG | HEART RATE: 65 BPM | WEIGHT: 254 LBS | BODY MASS INDEX: 34.4 KG/M2 | HEIGHT: 72 IN

## 2022-12-21 DIAGNOSIS — J30.9 ALLERGIC RHINITIS, UNSPECIFIED SEASONALITY, UNSPECIFIED TRIGGER: ICD-10-CM

## 2022-12-21 DIAGNOSIS — G47.33 OSA (OBSTRUCTIVE SLEEP APNEA): Primary | ICD-10-CM

## 2022-12-21 DIAGNOSIS — R09.89 PULMONARY AIR TRAPPING: ICD-10-CM

## 2022-12-21 NOTE — PATIENT INSTRUCTIONS
1   Continue use of CPAP equipment nightly - use any time you are laying down to rest, watch TV, etc to increase use and in case you fall asleep to prevent falling asleep without it  2  If air is too hot, turn down the tubing heating setting  3  If excessive dry mouth in the morning, turn up humidifier setting and be sure to only use distilled water in your humidifier  4   Change your filter once a month and wash the non-disposable filter  5  Continue to clean your equipment, as discussed, using mild soap and water  You can use unscented baby wipe on the mask  6   Contact the [facility] with any questions or concerns prior to your next visit, as needed  7  Schedule visit for follow-up in 3 months  You should see your sleep physician at least once a year  Nursing Support:  When: Monday through Friday 7A-5PM except holidays  Where: Our direct line is [phone]  If you are having a true emergency please call 911  In the event that the line is busy or it is after hours please leave a voice message and we will return your call  Please speak clearly, leaving your full name, birth date, best number to reach you and the reason for your call  Medication refills: We will need the name of the medication, the dosage, the ordering provider, whether you get a 30 or 90 day refill, and the pharmacy name and address  Medications will be ordered by the provider only  Nurses cannot call in prescriptions  Please allow 7 days for medication refills  Physician requested updates: If your provider requested that you call with an update after starting medication, please be ready to provide us the medication and dosage, what time you take your medication, the time you attempt to fall asleep, time you fall asleep, when you wake up, and what time you get out of bed  Sleep Study Results: We will contact you with sleep study results and/or next steps after the physician has reviewed your testing

## 2022-12-21 NOTE — ASSESSMENT & PLAN NOTE
Increase predicted residual volume and pulmonary function testing with % of predicted  This may be indicative of air trapping in the appropriate setting  However this patient does not have overt symptoms of asthma and I do not think he has significant air trapping based on his symptomatology  This may be a reflection of a normal variant higher lung volume or mild discrepancy and lung volume measurement during the PFT

## 2022-12-21 NOTE — PROGRESS NOTES
Pulmonary Outpatient Note   Adiel Putnam 49 y o  male MRN: [id number]  12/21/2022    Reason for Consultation:    Chief Complaint   Patient presents with   • Sleep Apnea         Assessment/Plan:    1  ENMA (obstructive sleep apnea)  Assessment & Plan:  He has a respiratory index of 5 3, with primarily obstructive apneas and hypopneas  Total time that was 6 hours and 31 minutes  I suspect that his gasping overnight is due to obstructive sleep apnea  Because he is symptomatic I prefer to treat his mild ENMA  I have ordered CPAP 5-20 cm H2O and we discussed the potential challenges he may meet with CPAP therapy, common pitfalls he may encounter  Likely he can do well with a nasal mask  Reevaluate compliance data in 1 to 2 months after CPAP initiation and reevaluate if CPAP is helping his nocturnal symptoms  He had a history of severe ENMA, status post gastric bypass surgery in 2008  He has lost upwards of 90 pounds  Obviously his burden of ENMA has significantly been improved by gastric bypass surgery  Orders:  -     CPAP Auto New DME    2  Allergic rhinitis, unspecified seasonality, unspecified trigger  Assessment & Plan:  Currently asymptomatic, does not need nasal sprays at the time  3  Pulmonary air trapping  Assessment & Plan:  Increase predicted residual volume and pulmonary function testing with % of predicted  This may be indicative of air trapping in the appropriate setting  However this patient does not have overt symptoms of asthma and I do not think he has significant air trapping based on his symptomatology  This may be a reflection of a normal variant higher lung volume or mild discrepancy and lung volume measurement during the PFT            Health Maintenance  Immunization History   Administered Date(s) Administered   • COVID-19 MODERNA VACC 0 5 ML IM 12/26/2020, 01/23/2021, 12/27/2021   • H1N1, All Formulations 10/30/2009   • INFLUENZA 10/21/2015, 10/13/2022   • Influenza, seasonal, injectable 12/02/2019   • Tdap 07/11/2019        Return in about 3 months (around 3/21/2023)  History of Present Illness   HPI:  Adiel Putnam is a 49 y o  male who has a past medical history of gastric bypass surgery, allergic rhinitis, diabetes mellitus who is presenting for follow-up for obstructive sleep apnea  Last visit with Dr Jone Alba 10/11/2022  At that time he was reporting episodes of shortness of breath while sleeping that wakes him up from sleep  These appear to be gasping episodes  He gets out of bed and walks around and goes back to sleep  This happens 2-3 times a week  He has a history of gastric bypass surgery in 2008  Previously he weighed up to 340 pounds and had a diagnosis of severe sleep apnea  He was on CPAP for 3 to 4 years  After gastric bypass surgery his CPAP was taken away by his bariatric surgeon to avoid excessive aerophagia that may interfere with the gastric bypass surgery  Currently 254 pounds  Weight relatively stable  Continues to have these gasping episodes overnight most nights  He says he grew up in a house of smokers and received a lot of secondhand smoke  He is a never smoker himself  He has 2 dogs at home  No birds or farm animal exposure  He works as a paramedic  His home was built in the 26s  Previously had a mold problem but this was eradicated  He does not know if he has had water damage to his home  No down comforters or pillows at home  He is not on inhalers  Review of systems:  12 point review of systems was completed and was otherwise negative except as listed in HPI      Historical Information   Past Medical History:   Diagnosis Date   • Allergic rhinitis    • Diabetes ([facility] [address] )    • Diabetes mellitus ([facility] [address] )      Past Surgical History:   Procedure Laterality Date   • GASTRIC BYPASS  2008    gastric surgery for morbid obesity-last assessed-9/19/2017   • VASECTOMY      surgery vas deferens-last assessed-9/19/2017     Family History Problem Relation Age of Onset   • Leukemia Mother    • Cancer Mother    • Heart attack Father         acute MI, initial episode of care   • Depression Father         with anxiety   • Stroke Father         CVA   • Hypertension Father    • Diabetes type I Father        Meds/Allergies     Current Outpatient Medications:   •  metFORMIN (GLUCOPHAGE) 1000 MG tablet, Take 1 tablet (1,000 mg total) by mouth 2 (two) times a day with meals, Disp: 180 tablet, Rfl: 3  Allergies   Allergen Reactions   • Nsaids      Hx gastric bypass       Vitals: Blood pressure 120/78, pulse 65, height 6' (1 829 m), weight 115 kg (254 lb), SpO2 96 %  Body mass index is 34 45 kg/m²  Oxygen Therapy  SpO2: 96 %  Oxygen Therapy: None (Room air)    Physical Exam  Constitutional:       General: He is not in acute distress  Appearance: Normal appearance  He is normal weight  He is not ill-appearing, toxic-appearing or diaphoretic  HENT:      Head: Normocephalic and atraumatic  Right Ear: External ear normal       Left Ear: External ear normal       Nose: Nose normal       Mouth/Throat:      Mouth: Mucous membranes are moist       Pharynx: Oropharynx is clear  No oropharyngeal exudate  Comments: Mallampati 3, scalloping of the tongue, no tonsillar hypertrophy noted  Eyes:      General: No scleral icterus  Extraocular Movements: Extraocular movements intact  Conjunctiva/sclera: Conjunctivae normal    Cardiovascular:      Rate and Rhythm: Normal rate and regular rhythm  Heart sounds: Normal heart sounds  Pulmonary:      Effort: Pulmonary effort is normal  No respiratory distress  Breath sounds: Normal breath sounds  No stridor  No wheezing, rhonchi or rales  Abdominal:      General: Abdomen is flat  There is no distension  Palpations: Abdomen is soft  Musculoskeletal:         General: No swelling or deformity  Normal range of motion  Cervical back: Normal range of motion and neck supple        Right lower leg: No edema  Left lower leg: No edema  Skin:     General: Skin is warm and dry  Capillary Refill: Capillary refill takes less than 2 seconds  Coloration: Skin is not jaundiced or pale  Findings: No lesion or rash  Neurological:      General: No focal deficit present  Mental Status: He is alert and oriented to person, place, and time  Mental status is at baseline  Motor: No weakness  Psychiatric:         Mood and Affect: Mood normal          Behavior: Behavior normal          Thought Content: Thought content normal          Labs: I have personally reviewed pertinent lab results  ABG: No results found for: PHART, EJW3UYS, PO2ART, UUK6PNJ, J6FUVGSR, BEART, SOURCE,   BNP: No results found for: BNP,   CBC:  Lab Results   Component Value Date    WBC 6 46 12/19/2022    HGB 15 8 12/19/2022    HCT 48 9 12/19/2022    MCV 94 12/19/2022     12/19/2022    EOSPCT 1 01/18/2022    EOSABS 0 09 01/18/2022    NEUTOPHILPCT 66 01/18/2022    LYMPHOPCT 24 01/18/2022   ,   CMP:   Lab Results   Component Value Date    SODIUM 140 12/19/2022    K 4 2 12/19/2022     12/19/2022    CO2 26 12/19/2022    BUN 17 12/19/2022    CREATININE 0 87 12/19/2022    CALCIUM 9 2 12/19/2022    AST 20 12/19/2022    ALT 28 12/19/2022    ALKPHOS 87 12/19/2022    EGFR 102 12/19/2022   ,   PT/INR: No results found for: PT, INR,   Troponin: No results found for: TROPONINI      Imaging and other studies: I have personally reviewed pertinent reports  and I have personally reviewed pertinent films in PACS    Sleep study  TESTING RESULTS:  The test results are from Night of 12/12/22  The total time in bed (analysis time) was 6 hours 31 minutes  The patient had a total of 34 respiratory events made up of 11 obstructive apneas, 2 central apneas/mixed apneas and 21 hypopneas resulting in a respiratory event index (EZ) of 5 3  The lowest SpO2 recorded is 87 0%      IMPRESSION:  1  This test is consistent with mild obstructive sleep apnea  Respiratory events were worse in the supine position  2 Baseline oxygen saturation was normal   3 Average heart rate was normal      Pulmonary function testing:   Pulmonary Functions Testing Results:  Results:  FEV1/FVC Ratio:  84%, FEV1 3 62 L/85%, FVC 4 31 L/80%     Lung volumes by body plethysmography: TLC 99%, %, DLCO 102%       Trey Fortune MD  Pulmonary, Critical Care and Sleep Medicine  [facility]     Portions of the record may have been created with voice recognition software  Occasional wrong word or "sound a like" substitutions may have occurred due to the inherent limitations of voice recognition software  Please read the chart carefully and recognize, using context, where substitutions have occurred

## 2022-12-21 NOTE — ASSESSMENT & PLAN NOTE
He has a respiratory index of 5 3, with primarily obstructive apneas and hypopneas  Total time that was 6 hours and 31 minutes  I suspect that his gasping overnight is due to obstructive sleep apnea  Because he is symptomatic I prefer to treat his mild ENMA  I have ordered CPAP 5-20 cm H2O and we discussed the potential challenges he may meet with CPAP therapy, common pitfalls he may encounter  Likely he can do well with a nasal mask  Reevaluate compliance data in 1 to 2 months after CPAP initiation and reevaluate if CPAP is helping his nocturnal symptoms  He had a history of severe ENMA, status post gastric bypass surgery in 2008  He has lost upwards of 90 pounds  Obviously his burden of ENMA has significantly been improved by gastric bypass surgery

## 2022-12-27 ENCOUNTER — OFFICE VISIT (OUTPATIENT)
Dept: FAMILY MEDICINE CLINIC | Facility: CLINIC | Age: 47
End: 2022-12-27

## 2022-12-27 VITALS
WEIGHT: 251 LBS | DIASTOLIC BLOOD PRESSURE: 70 MMHG | OXYGEN SATURATION: 96 % | SYSTOLIC BLOOD PRESSURE: 122 MMHG | BODY MASS INDEX: 33.27 KG/M2 | RESPIRATION RATE: 16 BRPM | HEART RATE: 72 BPM | HEIGHT: 73 IN

## 2022-12-27 DIAGNOSIS — Z12.11 SCREENING FOR COLON CANCER: ICD-10-CM

## 2022-12-27 DIAGNOSIS — J30.9 ALLERGIC RHINITIS, UNSPECIFIED SEASONALITY, UNSPECIFIED TRIGGER: ICD-10-CM

## 2022-12-27 DIAGNOSIS — E78.00 PURE HYPERCHOLESTEROLEMIA: ICD-10-CM

## 2022-12-27 DIAGNOSIS — E11.9 TYPE 2 DIABETES MELLITUS WITHOUT COMPLICATION, WITHOUT LONG-TERM CURRENT USE OF INSULIN (HCC): Primary | ICD-10-CM

## 2022-12-27 DIAGNOSIS — R09.89 PULMONARY AIR TRAPPING: ICD-10-CM

## 2022-12-27 DIAGNOSIS — Z12.11 SCREEN FOR COLON CANCER: ICD-10-CM

## 2022-12-27 DIAGNOSIS — G47.33 OSA (OBSTRUCTIVE SLEEP APNEA): ICD-10-CM

## 2022-12-27 DIAGNOSIS — R06.02 SHORTNESS OF BREATH: ICD-10-CM

## 2022-12-27 DIAGNOSIS — E66.9 OBESITY (BMI 30-39.9): ICD-10-CM

## 2022-12-27 NOTE — PATIENT INSTRUCTIONS
Continue diet exercise weight loss  If not contact within next 2 weeks for colonoscopy please call office  Return in 6 months for office visit blood work sooner if needed

## 2022-12-27 NOTE — PROGRESS NOTES
Name: Christina Ko      : 1975      MRN: [id number]  Encounter Provider: Maritza Shelton DO  Encounter Date: 2022   Encounter department: [facility]    Assessment & Plan     1  Type 2 diabetes mellitus without complication, without long-term current use of insulin (HCC)  Assessment & Plan:    Lab Results   Component Value Date    HGBA1C 6 3 (H) 2022   Stable well controlled on metformin  Patient to make appoint with his ophthalmologist after the holidays  Orders:  -     Hemoglobin A1C; Future; Expected date: 2023  -     Comprehensive metabolic panel; Future; Expected date: 2023  -     CBC and Platelet; Future; Expected date: 2023  -     Microalbumin / creatinine urine ratio; Future; Expected date: 2023  -     Lipid Panel with Direct LDL reflex; Future; Expected date: 2023  -     TSH, 3rd generation with Free T4 reflex; Future; Expected date: 2023    2  Screen for colon cancer    3  Pure hypercholesterolemia  Assessment & Plan:  Diet controlled    Orders:  -     Hemoglobin A1C; Future; Expected date: 2023  -     Comprehensive metabolic panel; Future; Expected date: 2023  -     CBC and Platelet; Future; Expected date: 2023  -     Microalbumin / creatinine urine ratio; Future; Expected date: 2023  -     Lipid Panel with Direct LDL reflex; Future; Expected date: 2023  -     TSH, 3rd generation with Free T4 reflex; Future; Expected date: 2023    4  Obesity (BMI 30-39  9)  Assessment & Plan:  BMI 33 12 patient has lost 3 lb continue diet exercise weight loss    Orders:  -     Hemoglobin A1C; Future; Expected date: 2023  -     Comprehensive metabolic panel; Future; Expected date: 2023  -     CBC and Platelet; Future; Expected date: 2023  -     Microalbumin / creatinine urine ratio; Future; Expected date: 2023  -     Lipid Panel with Direct LDL reflex;  Future; Expected date: 06/25/2023  -     TSH, 3rd generation with Free T4 reflex; Future; Expected date: 06/25/2023    5  Allergic rhinitis, unspecified seasonality, unspecified trigger  Assessment & Plan:  Asymptomatic at the present time    Orders:  -     Hemoglobin A1C; Future; Expected date: 06/25/2023  -     Comprehensive metabolic panel; Future; Expected date: 06/25/2023  -     CBC and Platelet; Future; Expected date: 06/25/2023  -     Microalbumin / creatinine urine ratio; Future; Expected date: 06/25/2023  -     Lipid Panel with Direct LDL reflex; Future; Expected date: 06/25/2023  -     TSH, 3rd generation with Free T4 reflex; Future; Expected date: 06/25/2023    6  ENMA (obstructive sleep apnea)  Assessment & Plan:  Start CPAP with sleep specialist    Orders:  -     Hemoglobin A1C; Future; Expected date: 06/25/2023  -     Comprehensive metabolic panel; Future; Expected date: 06/25/2023  -     CBC and Platelet; Future; Expected date: 06/25/2023  -     Microalbumin / creatinine urine ratio; Future; Expected date: 06/25/2023  -     Lipid Panel with Direct LDL reflex; Future; Expected date: 06/25/2023  -     TSH, 3rd generation with Free T4 reflex; Future; Expected date: 06/25/2023    7  Shortness of breath  Assessment & Plan:  Evaluated by pulmonology    Orders:  -     Hemoglobin A1C; Future; Expected date: 06/25/2023  -     Comprehensive metabolic panel; Future; Expected date: 06/25/2023  -     CBC and Platelet; Future; Expected date: 06/25/2023  -     Microalbumin / creatinine urine ratio; Future; Expected date: 06/25/2023  -     Lipid Panel with Direct LDL reflex; Future; Expected date: 06/25/2023  -     TSH, 3rd generation with Free T4 reflex; Future; Expected date: 06/25/2023    8  Screening for colon cancer  Assessment & Plan:  Patient is awaiting call from GI to schedule colonoscopy    Orders:  -     Hemoglobin A1C; Future; Expected date: 06/25/2023  -     Comprehensive metabolic panel;  Future; Expected date: 06/25/2023  - CBC and Platelet; Future; Expected date: 06/25/2023  -     Microalbumin / creatinine urine ratio; Future; Expected date: 06/25/2023  -     Lipid Panel with Direct LDL reflex; Future; Expected date: 06/25/2023  -     TSH, 3rd generation with Free T4 reflex; Future; Expected date: 06/25/2023    9  Pulmonary air trapping  Assessment & Plan:  Stable follow-up pulmonology    Orders:  -     Hemoglobin A1C; Future; Expected date: 06/25/2023  -     Comprehensive metabolic panel; Future; Expected date: 06/25/2023  -     CBC and Platelet; Future; Expected date: 06/25/2023  -     Microalbumin / creatinine urine ratio; Future; Expected date: 06/25/2023  -     Lipid Panel with Direct LDL reflex; Future; Expected date: 06/25/2023  -     TSH, 3rd generation with Free T4 reflex; Future; Expected date: 06/25/2023        Depression Screening and Follow-up Plan: Patient was screened for depression during today's encounter  They screened negative with a PHQ-2 score of 0  Subjective      Patient's home glucometer seems to be running about 40 points higher than the lab  He will check his controls  Otherwise he is doing very well lost 3 lb hemoglobin A1c shows he is very well controlled his diabetes  Patient is following with Pulmonary Medicine and has started CPAP for ENMA    Review of Systems   Constitutional: Negative  HENT: Negative  Eyes: Negative  Respiratory:        HPI   Cardiovascular: Negative  Gastrointestinal: Negative  Endocrine: Negative  Genitourinary: Negative  Musculoskeletal: Negative  Skin: Negative  Allergic/Immunologic: Negative  Neurological: Negative  Hematological: Negative  Psychiatric/Behavioral: Negative          Current Outpatient Medications on File Prior to Visit   Medication Sig   • metFORMIN (GLUCOPHAGE) 1000 MG tablet Take 1 tablet (1,000 mg total) by mouth 2 (two) times a day with meals       Objective     /70 (BP Location: Left arm, Patient Position: Sitting, Cuff Size: Large)   Pulse 72   Resp 16   Ht 6' 1" (1 854 m)   Wt 114 kg (251 lb)   SpO2 96%   BMI 33 12 kg/m²     Physical Exam  Vitals and nursing note reviewed  Constitutional:       Appearance: Normal appearance  HENT:      Head: Normocephalic and atraumatic  Eyes:      General: No scleral icterus  Neck:      Vascular: No carotid bruit  Cardiovascular:      Rate and Rhythm: Normal rate and regular rhythm  Heart sounds: Normal heart sounds  Pulmonary:      Effort: Pulmonary effort is normal       Breath sounds: Normal breath sounds  Abdominal:      General: Bowel sounds are normal       Palpations: Abdomen is soft  Tenderness: There is no abdominal tenderness  Musculoskeletal:      Cervical back: Neck supple  Right lower leg: No edema  Left lower leg: No edema  Skin:     General: Skin is warm  Neurological:      General: No focal deficit present  Mental Status: He is alert     Psychiatric:         Mood and Affect: Mood normal        Pramod Jack DO

## 2022-12-27 NOTE — ASSESSMENT & PLAN NOTE
Lab Results   Component Value Date    HGBA1C 6 3 (H) 12/19/2022   Stable well controlled on metformin  Patient to make appoint with his ophthalmologist after the holidays

## 2022-12-31 LAB

## 2023-01-04 LAB
LEFT EYE DIABETIC RETINOPATHY: NORMAL
RIGHT EYE DIABETIC RETINOPATHY: NORMAL

## 2023-01-11 ENCOUNTER — TELEPHONE (OUTPATIENT)
Dept: PULMONOLOGY | Facility: CLINIC | Age: 48
End: 2023-01-11

## 2023-01-11 NOTE — TELEPHONE ENCOUNTER
Patient is calling, he said he received his new Cpap machine in the mail 2 weeks ago  He said no one called him about this and it was just dropped off at his door  He doesn't know what to do now, no one advised him of next steps  He said he does not have a mask or anything and has no idea how to use this machine  He doesn't know which DME company he is supplied with either  He hopes for a return call with more detailed information of what to do now   Please advise

## 2023-01-16 NOTE — TELEPHONE ENCOUNTER
I did call Mr Payton Menchaca back  I have to do a little research on how we can assist with getting him set up  Patient voiced understanding and will wait for my call back

## 2023-01-24 ENCOUNTER — TELEPHONE (OUTPATIENT)
Dept: SLEEP CENTER | Facility: CLINIC | Age: 48
End: 2023-01-24

## 2023-01-24 ENCOUNTER — TELEPHONE (OUTPATIENT)
Dept: PULMONOLOGY | Facility: CLINIC | Age: 48
End: 2023-01-24

## 2023-01-24 NOTE — TELEPHONE ENCOUNTER
Pt  was shipped a machine but no mask and has not used it at all b/c of this  I demonstrated the machine to him RESMED S10 (NO MODEM) and fit him with the F20 (M) with (L) HG

## 2023-01-24 NOTE — TELEPHONE ENCOUNTER
Spoke with patient to schedule a 3 month follow up at our [facility] office with Dr Elijah Kitchen or ROSENDA in March but patient stated he will call us back to schedule the follow up  Informed patient I would mail him a letter to remind him to schedule his follow up

## 2023-02-25 PROBLEM — Z12.11 SCREENING FOR COLON CANCER: Status: RESOLVED | Noted: 2022-06-20 | Resolved: 2023-02-25

## 2023-03-13 ENCOUNTER — OFFICE VISIT (OUTPATIENT)
Dept: SLEEP CENTER | Facility: CLINIC | Age: 48
End: 2023-03-13

## 2023-03-13 VITALS
DIASTOLIC BLOOD PRESSURE: 78 MMHG | WEIGHT: 251 LBS | HEIGHT: 73 IN | BODY MASS INDEX: 33.27 KG/M2 | SYSTOLIC BLOOD PRESSURE: 112 MMHG

## 2023-03-13 DIAGNOSIS — G47.33 OSA ON CPAP: Primary | ICD-10-CM

## 2023-03-13 DIAGNOSIS — E66.9 OBESITY (BMI 30-39.9): ICD-10-CM

## 2023-03-13 DIAGNOSIS — Z99.89 OSA ON CPAP: Primary | ICD-10-CM

## 2023-03-13 NOTE — PATIENT INSTRUCTIONS
Sleep Apnea   AMBULATORY CARE:   Sleep apnea  is a condition that causes you to stop breathing often during sleep  Types of sleep apnea:   Obstructive sleep apnea (ENMA)  is the most common kind  The muscles and tissues around your throat relax and block air from passing through  Obesity, use of alcohol or cigarettes, or a family history are common causes  ENMA may increase your risk for complications after surgery  Central sleep apnea (CSA)  means your brain does not send signals to the muscles that control breathing  You do not take a breath even though your airway is open  Common causes include medical conditions such as heart failure, being older than 40, or use of opioids  Complex (or mixed) sleep apnea  means you have both obstructive and central sleep apnea  Common signs and symptoms:   Loud snoring or long pauses in breathing    Feeling sleepy, slow, and tired during the day    Snorting, gasping, or choking while you sleep, and waking up suddenly because of these    Feeling irritable during the day    Dry mouth or a headache in the mornings    Heavy night sweating    A hard time thinking, remembering things, or focusing on your tasks the following day    Call your local emergency number (911 in the [address]) if:   You have chest pain or trouble breathing  Call your doctor if:   You have new or worsening signs or symptoms  You have questions or concerns about your condition or care  Treatment  depends on the kind of apnea you have  A mouth device  may be needed if you have mild sleep apnea  These are designed to keep your throat open  Ask your dentist or healthcare provider about the best mouth device for you  A machine  may be used to help you get more air during sleep  A mask may be placed over your nose and mouth, or just your nose  The mask is hooked to the machine  You will get air through the mask      A continuous positive airway pressure (CPAP) machine  is used to keep your airway open during sleep  The machine blows a gentle stream of air into the mask when you breathe  This helps keep your airway open so you can breathe more regularly  Extra oxygen may be given through the machine  A bilevel positive airway pressure (BiPAP) machine  gives air but lowers the pressure when you breathe out  An adaptive servo-ventilator (ASV)  is a machine that learns your usual breathing pattern  Then, it uses pressure to give you air and prevent stops in your breathing  Surgery  to expand your airway or remove extra tissues may be needed  Surgery is usually only considered if other treatments do not work  Manage or prevent sleep apnea:   Reach and maintain a healthy weight  Ask your healthcare provider what a healthy weight is for you  Ask your provider to help you create a safe weight loss plan if you are overweight  Even a small goal of a 10% weight loss can improve your symptoms  Do not smoke  Nicotine and other chemicals in cigarettes and cigars can cause lung damage  Ask your healthcare provider for information if you currently smoke and need help to quit  E-cigarettes or smokeless tobacco still contain nicotine  Talk to your healthcare provider before you use these products  Do not drink alcohol or take sedative medicine before you go to sleep  Alcohol and sedatives can relax the muscles and tissues around your throat  This can block the airflow to your lungs  Sleep on your side or use pillows designed to prevent sleep apnea  This prevents your tongue or other tissues from blocking your throat  You can also raise the head of your bed  Follow up with your doctor or specialist as directed: You may need to have blood tests during your follow-up visits  Work with your provider to find the right breathing support equipment and settings for you  Write down your questions so you remember to ask them during your visits    © Copyright [employer] 2022 Information is for End User's use only and may not be sold, redistributed or otherwise used for commercial purposes  The above information is an  only  It is not intended as medical advice for individual conditions or treatments  Talk to your doctor, nurse or pharmacist before following any medical regimen to see if it is safe and effective for you

## 2023-03-13 NOTE — ASSESSMENT & PLAN NOTE
· Delano Frederick had reestablish his diagnosis of sleep apnea in December 2022 he has mild ENMA with AHI 5 3 events per hour, he was set up on auto CPAP with pressures of 5-20 he is here for compliance check up and he is doing very well  His symptoms have resolved and he is feels that his excessive daytime sleepiness has gotten significantly better, has been tolerating the CPAP very well has been using it almost every night except if he has to sleep at his workplace for long hours of work sometimes which happens rarely, I reviewed his compliance today he has minimal residual AHI of 0 6 events per hour  · I explained to the patient in details the pathophysiology of obstructive sleep apnea  I also explained the importance of treatment, and the consequences of untreated obstructive sleep apnea on underlying cardiovascular and cerebrovascular risk, morbidity, and mortality

## 2023-03-13 NOTE — ASSESSMENT & PLAN NOTE
BMI 33 12, noted he has history of gastric bypass in the past he would benefit from continuing healthy lifestyle and weight loss

## 2023-03-13 NOTE — PROGRESS NOTES
Pulmonary/Sleep Follow Up Note   Rikki Menchaca 51 y o  male MRN: [id number]  3/13/2023      Assessment and Plan:    1  ENMA on CPAP  Assessment & Plan:  · Nikos Samayoa had reestablish his diagnosis of sleep apnea in December 2022 he has mild ENMA with AHI 5 3 events per hour, he was set up on auto CPAP with pressures of 5-20 he is here for compliance check up and he is doing very well  His symptoms have resolved and he is feels that his excessive daytime sleepiness has gotten significantly better, has been tolerating the CPAP very well has been using it almost every night except if he has to sleep at his workplace for long hours of work sometimes which happens rarely, I reviewed his compliance today he has minimal residual AHI of 0 6 events per hour  · I explained to the patient in details the pathophysiology of obstructive sleep apnea  I also explained the importance of treatment, and the consequences of untreated obstructive sleep apnea on underlying cardiovascular and cerebrovascular risk, morbidity, and mortality  2  Obesity (BMI 30-39  9)  Assessment & Plan:  BMI 33 12, noted he has history of gastric bypass in the past he would benefit from continuing healthy lifestyle and weight loss          Return in about 1 year (around 3/13/2024)  History of Present Illness   HPI:  Rikki Menchaca is a 51 y o  male who is here for a follow-up appointment he was last seen by Dr Sheng Ge earlier this year to follow-up on recently established diagnosis of mild ENMA  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 and excessive daytime sleepiness, he reports history of remotely diagnosed ENMA after which he had a gastric bypass surgery and lost significant weight and stopped using CPAP until he got a repeat home sleep study in December 2022 found to have a mild ENMA with AHI 5 3 events per hour    He was started on a trial of auto CPAP with pressure range of 5 to 20 cm H2O with significant improvement of his symptoms, he reports adequate adherence to the machine and adequate response in his excessive sleepiness and disappearance of his snoring  Review of Systems   All other systems reviewed and are negative  Historical Information   Past Medical History:   Diagnosis Date   • Allergic rhinitis    • Diabetes ([facility] [address] )    • Diabetes mellitus ([facility] )      Past Surgical History:   Procedure Laterality Date   • GASTRIC BYPASS  2008    gastric surgery for morbid obesity-last assessed-9/19/2017   • VASECTOMY      surgery vas deferens-last assessed-9/19/2017     Family History   Problem Relation Age of Onset   • Leukemia Mother    • Cancer Mother    • Heart attack Father         acute MI, initial episode of care   • Depression Father         with anxiety   • Stroke Father         CVA   • Hypertension Father    • Diabetes type I Father          Meds/Allergies     Current Outpatient Medications:   •  metFORMIN (GLUCOPHAGE) 1000 MG tablet, Take 1 tablet (1,000 mg total) by mouth 2 (two) times a day with meals, Disp: 180 tablet, Rfl: 3  Allergies   Allergen Reactions   • Nsaids      Hx gastric bypass       Vitals: Blood pressure 112/78, height 6' 1" (1 854 m), weight 114 kg (251 lb)  Body mass index is 33 12 kg/m²  Physical Exam  General:  Awake alert and oriented x 3, conversant without conversational dyspnea, NAD, normal affect  HEENT:   Sclera noninjected, nonicteric OU, Nares patent,  no craniofacial abnormalities, Mucous membranes, moist, no oral lesions, normal dentition  NECK:  Trachea midline, no accessory muscle use, no stridor,  JVP not elevated  CARDIAC: Reg, single s1/S2, no m/r/g  PULM: CTA bilaterally no wheezing, rhonchi or rales  ABD: Soft nontender, nondistended, no rebound, no rigidity, no guarding  EXT: No cyanosis, no clubbing, no edema, normal capillary refill  NEURO: no focal neurologic deficits, AAOx3, moving all extremities appropriately    Labs:  I have personally reviewed pertinent lab results  , ABG: No results found for: PHART, JIT4VMO, PO2ART, HWP0BPJ, D6EXDSWD, BEART, SOURCE, BNP: No results found for: BNP, CBC: No results found for: WBC, HGB, HCT, MCV, PLT, ADJUSTEDWBC, MCH, MCHC, RDW, MPV, NRBC, CMP: No results found for: SODIUM, K, CL, CO2, ANIONGAP, BUN, CREATININE, GLUCOSE, CALCIUM, AST, ALT, ALKPHOS, PROT, BILITOT, EGFR, PT/INR: No results found for: PT, INR, Troponin: No results found for: TROPONINI  Lab Results   Component Value Date    WBC 6 46 12/19/2022    HGB 15 8 12/19/2022    HCT 48 9 12/19/2022    MCV 94 12/19/2022     12/19/2022     Lab Results   Component Value Date    CALCIUM 9 2 12/19/2022    K 4 2 12/19/2022    CO2 26 12/19/2022     12/19/2022    BUN 17 12/19/2022    CREATININE 0 87 12/19/2022     No results found for: IGE  Lab Results   Component Value Date    ALT 28 12/19/2022    AST 20 12/19/2022    ALKPHOS 87 12/19/2022           Pulmonary function testing 8/2022:   FEV1/FVC Ratio:  84%, FEV1 3 62 L/85%, FVC 4 31 L/80%     Lung volumes by body plethysmography: TLC 99%, %, DLCO 102%        Sleep studies: I have personally reviewed pertinent reports  HST 12/2022: AHI 5 3 events/hr     Compliance report:I have personally reviewed pertinent reports  Type of CPAP:  APAP 5-20                                   Percent usage: 96%                                   Average time used: 6 hr 6 min                                  Time in large leak: not sig                                   Residual AHI: 0 6             Dawna Morillo MD  [facility]       Portions of the record may have been created with voice recognition software  Occasional wrong word or "sound a like" substitutions may have occurred due to the inherent limitations of voice recognition software  Read the chart carefully and recognize, using context, where substitutions have occurred

## 2023-06-28 ENCOUNTER — APPOINTMENT (OUTPATIENT)
Dept: RADIOLOGY | Facility: CLINIC | Age: 48
End: 2023-06-28
Payer: COMMERCIAL

## 2023-06-28 ENCOUNTER — OFFICE VISIT (OUTPATIENT)
Dept: FAMILY MEDICINE CLINIC | Facility: CLINIC | Age: 48
End: 2023-06-28
Payer: COMMERCIAL

## 2023-06-28 VITALS
HEIGHT: 73 IN | DIASTOLIC BLOOD PRESSURE: 62 MMHG | HEART RATE: 76 BPM | WEIGHT: 241 LBS | BODY MASS INDEX: 31.94 KG/M2 | SYSTOLIC BLOOD PRESSURE: 104 MMHG

## 2023-06-28 DIAGNOSIS — S12.9XXD CLOSED FRACTURE OF TRANSVERSE PROCESS OF CERVICAL VERTEBRA, SUBSEQUENT ENCOUNTER: ICD-10-CM
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
## 2023-06-28 DIAGNOSIS — R07.89 CHEST WALL PAIN: ICD-10-CM

## 2023-06-28 DIAGNOSIS — E66.9 OBESITY (BMI 30-39.9): ICD-10-CM
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
## 2023-06-28 DIAGNOSIS — Z98.84 HX OF GASTRIC BYPASS: ICD-10-CM

## 2023-06-28 DIAGNOSIS — S40.211D ABRASION OF RIGHT SHOULDER, SUBSEQUENT ENCOUNTER: ICD-10-CM

## 2023-06-28 PROBLEM — R06.02 SHORTNESS OF BREATH: Status: RESOLVED | Noted: 2022-08-12 | Resolved: 2023-06-28

## 2023-06-28 PROBLEM — S12.9XXA CLOSED FRACTURE OF TRANSVERSE PROCESS OF CERVICAL VERTEBRA (HCC): Status: ACTIVE | Noted: 2023-06-22

## 2023-06-28 PROCEDURE — 99214 OFFICE O/P EST MOD 30 MIN: CPT | Performed by: FAMILY MEDICINE

## 2023-06-28 PROCEDURE — 71101 X-RAY EXAM UNILAT RIBS/CHEST: CPT

## 2023-06-28 RX ORDER — CELECOXIB 200 MG/1
200 CAPSULE ORAL 2 TIMES DAILY
Qty: 60 CAPSULE | Refills: 0 | Status: SHIPPED | OUTPATIENT
Start: 2023-06-28

## 2023-06-28 RX ORDER — AMOXICILLIN 250 MG
1 CAPSULE ORAL 2 TIMES DAILY
COMMUNITY
Start: 2023-06-22 | End: 2024-06-21

## 2023-06-28 RX ORDER — CELECOXIB 200 MG/1
200 CAPSULE ORAL 2 TIMES DAILY
Qty: 60 CAPSULE | Refills: 0 | Status: SHIPPED | OUTPATIENT
Start: 2023-06-28 | End: 2023-06-28

## 2023-06-28 RX ORDER — ACETAMINOPHEN 500 MG
1000 TABLET ORAL EVERY 8 HOURS PRN
COMMUNITY
Start: 2023-06-22 | End: 2023-07-02

## 2023-06-28 RX ORDER — LIDOCAINE 50 MG/G
1 PATCH TOPICAL DAILY
Qty: 30 PATCH | Refills: 0 | Status: SHIPPED | OUTPATIENT
Start: 2023-06-28

## 2023-06-28 RX ORDER — OXYCODONE HYDROCHLORIDE 5 MG/1
5 TABLET ORAL EVERY 4 HOURS PRN
COMMUNITY
Start: 2023-06-22

## 2023-06-28 RX ORDER — LIDOCAINE 50 MG/G
1 PATCH TOPICAL DAILY
Qty: 30 PATCH | Refills: 0 | Status: SHIPPED | OUTPATIENT
Start: 2023-06-28 | End: 2023-06-28

## 2023-06-28 NOTE — PROGRESS NOTES
Name: Raven Garza      : 1975      MRN: [id number]  Encounter Provider: Kosta Haines DO  Encounter Date: 2023   Encounter department: [facility] PR[facility]    Assessment & Plan   1  Fall downstairs seen at shock trauma lab 2023  2  C6 transverse process fracture, doing well to follow-up with neurosurgery/orthopedics as needed  3  Right shoulder abrasion, healing well up-to-date with tetanus at [facility]  4  Chest wall pain  Rib x-ray/chest ordered, initial CT was negative  Lidoderm patch ordered  Celebrex ordered  Patient's Tylenol No  5   History of gastric bypass, NSAID cannot be used however Mcgregor 2 is appropriate  6  BMI 31 8 patient lost 10 pounds continue diet exercise weight loss      1  Fall down stairs, subsequent encounter  -     XR ribs right w pa chest min 3 views; Future; Expected date: 2023  -     lidocaine (Lidoderm) 5 %; Apply 1 patch topically over 12 hours daily Remove & Discard patch within 12 hours or as directed by MD  -     celecoxib (CeleBREX) 200 mg capsule; Take 1 capsule (200 mg total) by mouth 2 (two) times a day    2  Closed fracture of transverse process of cervical vertebra, subsequent encounter  Assessment & Plan:  Treated at [facility], shock trauma unit to return to orthopedics as needed    Orders:  -     XR ribs right w pa chest min 3 views; Future; Expected date: 2023  -     lidocaine (Lidoderm) 5 %; Apply 1 patch topically over 12 hours daily Remove & Discard patch within 12 hours or as directed by MD  -     celecoxib (CeleBREX) 200 mg capsule; Take 1 capsule (200 mg total) by mouth 2 (two) times a day    3  Abrasion of right shoulder, subsequent encounter  -     XR ribs right w pa chest min 3 views; Future; Expected date: 2023  -     lidocaine (Lidoderm) 5 %; Apply 1 patch topically over 12 hours daily Remove & Discard patch within 12 hours or as directed by MD  -     celecoxib (CeleBREX) 200 mg capsule;  Take 1 capsule (200 mg total) by mouth 2 (two) times a day    4  Chest wall pain  -     XR ribs right w pa chest min 3 views; Future; Expected date: 06/28/2023  -     lidocaine (Lidoderm) 5 %; Apply 1 patch topically over 12 hours daily Remove & Discard patch within 12 hours or as directed by MD  -     celecoxib (CeleBREX) 200 mg capsule; Take 1 capsule (200 mg total) by mouth 2 (two) times a day    5  Hx of gastric bypass  Assessment & Plan:  Because of this no NSAID will use Mcgregor 2      6  Obesity (BMI 30-39  9)  Assessment & Plan:  BMI 31 8, patient has lost 10 pounds continue diet exercise and weight loss        BMI Counseling: Body mass index is 31 8 kg/m²  The BMI is above normal  Nutrition recommendations include moderation in carbohydrate intake and reducing intake of cholesterol  Exercise recommendations include exercising 3-5 times per week  Rationale for BMI follow-up plan is due to patient being overweight or obese  Subjective      Patient fell down 14 stairs was taken to the shock trauma unit [facility]   Patient sustained a transverse process fracture C6 and was observed for 24 hours and discharged  Patient states his neck is doing well however is having increasing right lateral chest wall pain  Patient initial CTs were negative for fracture    Review of Systems   Constitutional: Negative  HENT: Negative  Eyes: Negative  Respiratory: Negative  Cardiovascular: Negative  Gastrointestinal: Negative  Endocrine: Negative  Genitourinary: Negative  Musculoskeletal:        HPI   Skin: Negative  Allergic/Immunologic: Negative  Neurological: Negative  Hematological: Negative  Psychiatric/Behavioral: Negative          Current Outpatient Medications on File Prior to Visit   Medication Sig   • acetaminophen (TYLENOL) 500 mg tablet Take 1,000 mg by mouth every 8 (eight) hours as needed   • metFORMIN (GLUCOPHAGE) 1000 MG tablet Take 1 tablet (1,000 mg total) by mouth 2 (two) times "a day with meals   • oxyCODONE (ROXICODONE) 5 immediate release tablet Take 5 mg by mouth every 4 (four) hours as needed   • senna-docusate sodium (SENOKOT S) 8 6-50 mg per tablet Take 1 tablet by mouth 2 (two) times a day       Objective     /62   Pulse 76   Ht 6' 1\" (1 854 m)   Wt 109 kg (241 lb)   BMI 31 80 kg/m²     Physical Exam  Vitals reviewed  Constitutional:       Appearance: Normal appearance  HENT:      Head: Normocephalic and atraumatic  Mouth/Throat:      Mouth: Mucous membranes are moist    Neck:      Comments: Mild right tenderness about C6 negative gross deformity  Cardiovascular:      Rate and Rhythm: Normal rate and regular rhythm  Heart sounds: Normal heart sounds  Pulmonary:      Effort: Pulmonary effort is normal       Breath sounds: Normal breath sounds  Musculoskeletal:         General: Tenderness present  No deformity  Cervical back: Neck supple  Tenderness present  Comments: Right inferior lateral chest wall tenderness negative gross deformity negative crepitus   Skin:     General: Skin is warm and dry  Findings: Bruising present  Comments: Right clavicular abrasion, healing well positive ecchymosis   Neurological:      General: No focal deficit present  Mental Status: He is alert and oriented to person, place, and time  Motor: No weakness     Psychiatric:         Mood and Affect: Mood normal        Pramod Jack DO"

## 2023-06-28 NOTE — PATIENT INSTRUCTIONS
Pleat chest/rib x-ray  May use ice 15 minutes 3-4 times a day 15 minutes  Use Lidoderm patch 12 hours on 12 hours off  Use Celebrex twice daily as needed for pain  May use Tylenol  Return in 1 week if no improvement

## 2023-07-03 ENCOUNTER — TELEPHONE (OUTPATIENT)
Dept: FAMILY MEDICINE CLINIC | Facility: CLINIC | Age: 48
End: 2023-07-03

## 2023-07-03 NOTE — TELEPHONE ENCOUNTER
Patient needs a note to go back to work. He will pick it up Wednesday.  He seen Dr. Ranjan Shahid on 6/28 and sent a my chart note also

## 2023-07-03 NOTE — TELEPHONE ENCOUNTER
----- Message from Sage paredesejo, [address] sent at 7/3/2023  8:52 AM EDT -----  Regarding: FW: Rib pain   Contact: [phone]    ----- Message -----  From: Malcolm Lopez  Sent: 7/2/2023  11:36 PM EDT  To: [facility]  Subject: Rib pain                                         I saw results are no fracture. I am supposed too go back to work on Thursday. I am feeling better but I still can’t bend over and pick anything up without pain and I still have the popping sensation in right rib area with any movement. This popping is usually just an annoyance but sometimes very painful. Work wants me to either come back or go on short term disability. They need to know how long you feel I would need to be out before I can start that paperwork. I would rather go back to work but I am concerned that one wrong movement and I could make it worse. What are your thoughts?     Malcolm Lopez

## 2023-07-03 NOTE — LETTER
July 5, 2023     Patient: Yuridia Myers   YOB: 1975   Date of Visit: 7/3/2023       To Whom It May Concern: It is my medical opinion that Yuridia Myers may return to work on 7/5/2023 . If you have any questions or concerns, please don't hesitate to call.          Sincerely,        Pramod Jack,     CC: No Recipients

## 2023-07-05 ENCOUNTER — TELEPHONE (OUTPATIENT)
Dept: FAMILY MEDICINE CLINIC | Facility: CLINIC | Age: 48
End: 2023-07-05

## 2023-07-05 ENCOUNTER — APPOINTMENT (OUTPATIENT)
Dept: LAB | Facility: CLINIC | Age: 48
End: 2023-07-05
Payer: COMMERCIAL

## 2023-07-05 DIAGNOSIS — R06.02 SHORTNESS OF BREATH: ICD-10-CM

## 2023-07-05 DIAGNOSIS — Z12.11 SCREENING FOR COLON CANCER: ICD-10-CM

## 2023-07-05 DIAGNOSIS — J30.9 ALLERGIC RHINITIS, UNSPECIFIED SEASONALITY, UNSPECIFIED TRIGGER: ICD-10-CM

## 2023-07-05 DIAGNOSIS — E78.00 PURE HYPERCHOLESTEROLEMIA: ICD-10-CM

## 2023-07-05 DIAGNOSIS — R09.89 PULMONARY AIR TRAPPING: ICD-10-CM

## 2023-07-05 DIAGNOSIS — G47.33 OSA (OBSTRUCTIVE SLEEP APNEA): ICD-10-CM

## 2023-07-05 DIAGNOSIS — E66.9 OBESITY (BMI 30-39.9): ICD-10-CM

## 2023-07-05 DIAGNOSIS — E11.9 TYPE 2 DIABETES MELLITUS WITHOUT COMPLICATION, WITHOUT LONG-TERM CURRENT USE OF INSULIN (HCC): ICD-10-CM

## 2023-07-05 LAB
ALBUMIN SERPL BCP-MCNC: 3.7 G/DL (ref 3.5–5)
ALP SERPL-CCNC: 83 U/L (ref 46–116)
ALT SERPL W P-5'-P-CCNC: 27 U/L (ref 12–78)
ANION GAP SERPL CALCULATED.3IONS-SCNC: 4 MMOL/L
AST SERPL W P-5'-P-CCNC: 20 U/L (ref 5–45)
BILIRUB SERPL-MCNC: 0.83 MG/DL (ref 0.2–1)
BUN SERPL-MCNC: 11 MG/DL (ref 5–25)
CALCIUM SERPL-MCNC: 9.1 MG/DL (ref 8.3–10.1)
CHLORIDE SERPL-SCNC: 111 MMOL/L (ref 96–108)
CHOLEST SERPL-MCNC: 161 MG/DL
CO2 SERPL-SCNC: 25 MMOL/L (ref 21–32)
CREAT SERPL-MCNC: 0.96 MG/DL (ref 0.6–1.3)
CREAT UR-MCNC: 114 MG/DL
ERYTHROCYTE [DISTWIDTH] IN BLOOD BY AUTOMATED COUNT: 12.9 % (ref 11.6–15.1)
GFR SERPL CREATININE-BSD FRML MDRD: 92 ML/MIN/1.73SQ M
GLUCOSE P FAST SERPL-MCNC: 118 MG/DL (ref 65–99)
HCT VFR BLD AUTO: 42.5 % (ref 36.5–49.3)
HDLC SERPL-MCNC: 51 MG/DL
HGB BLD-MCNC: 14.4 G/DL (ref 12–17)
LDLC SERPL CALC-MCNC: 87 MG/DL (ref 0–100)
MCH RBC QN AUTO: 31.2 PG (ref 26.8–34.3)
MCHC RBC AUTO-ENTMCNC: 33.9 G/DL (ref 31.4–37.4)
MCV RBC AUTO: 92 FL (ref 82–98)
MICROALBUMIN UR-MCNC: 6.8 MG/L (ref 0–20)
MICROALBUMIN/CREAT 24H UR: 6 MG/G CREATININE (ref 0–30)
PLATELET # BLD AUTO: 339 THOUSANDS/UL (ref 149–390)
PMV BLD AUTO: 10.2 FL (ref 8.9–12.7)
POTASSIUM SERPL-SCNC: 4.5 MMOL/L (ref 3.5–5.3)
PROT SERPL-MCNC: 6.8 G/DL (ref 6.4–8.4)
RBC # BLD AUTO: 4.62 MILLION/UL (ref 3.88–5.62)
SODIUM SERPL-SCNC: 140 MMOL/L (ref 135–147)
TRIGL SERPL-MCNC: 116 MG/DL
TSH SERPL DL<=0.05 MIU/L-ACNC: 2.78 UIU/ML (ref 0.45–4.5)
WBC # BLD AUTO: 6.17 THOUSAND/UL (ref 4.31–10.16)

## 2023-07-05 PROCEDURE — 80053 COMPREHEN METABOLIC PANEL: CPT

## 2023-07-05 PROCEDURE — 83036 HEMOGLOBIN GLYCOSYLATED A1C: CPT

## 2023-07-05 PROCEDURE — 84443 ASSAY THYROID STIM HORMONE: CPT

## 2023-07-05 PROCEDURE — 85027 COMPLETE CBC AUTOMATED: CPT

## 2023-07-05 PROCEDURE — 82570 ASSAY OF URINE CREATININE: CPT

## 2023-07-05 PROCEDURE — 80061 LIPID PANEL: CPT

## 2023-07-05 PROCEDURE — 82043 UR ALBUMIN QUANTITATIVE: CPT

## 2023-07-05 PROCEDURE — 36415 COLL VENOUS BLD VENIPUNCTURE: CPT

## 2023-07-05 NOTE — TELEPHONE ENCOUNTER
Pt dropped off FMLA forms for TS to fill out. Pt was out of work for a fall. He will not be using FMLA going forward, but he will need the forms filled out for the days he was out of work. .. if the forms aren't filled out for those days, the absences will be "unexcused". Forms were placed on TS's desk.

## 2023-07-07 LAB
EST. AVERAGE GLUCOSE BLD GHB EST-MCNC: 126 MG/DL
HBA1C MFR BLD: 6 %

## 2023-07-10 ENCOUNTER — OFFICE VISIT (OUTPATIENT)
Dept: FAMILY MEDICINE CLINIC | Facility: CLINIC | Age: 48
End: 2023-07-10
Payer: COMMERCIAL

## 2023-07-10 VITALS
DIASTOLIC BLOOD PRESSURE: 70 MMHG | SYSTOLIC BLOOD PRESSURE: 100 MMHG | TEMPERATURE: 97.2 F | WEIGHT: 245.4 LBS | HEIGHT: 73 IN | HEART RATE: 70 BPM | BODY MASS INDEX: 32.52 KG/M2 | RESPIRATION RATE: 16 BRPM

## 2023-07-10 DIAGNOSIS — E66.9 OBESITY (BMI 30-39.9): ICD-10-CM

## 2023-07-10 DIAGNOSIS — Z12.11 SCREENING FOR COLON CANCER: ICD-10-CM

## 2023-07-10 DIAGNOSIS — E11.9 TYPE 2 DIABETES MELLITUS WITHOUT COMPLICATION, WITHOUT LONG-TERM CURRENT USE OF INSULIN (HCC): ICD-10-CM

## 2023-07-10 DIAGNOSIS — Z99.89 OSA ON CPAP: ICD-10-CM

## 2023-07-10 DIAGNOSIS — G47.33 OSA ON CPAP: ICD-10-CM

## 2023-07-10 DIAGNOSIS — E78.00 PURE HYPERCHOLESTEROLEMIA: ICD-10-CM

## 2023-07-10 DIAGNOSIS — S12.9XXD CLOSED FRACTURE OF TRANSVERSE PROCESS OF CERVICAL VERTEBRA, SUBSEQUENT ENCOUNTER: ICD-10-CM

## 2023-07-10 DIAGNOSIS — Z98.84 HX OF GASTRIC BYPASS: ICD-10-CM

## 2023-07-10 DIAGNOSIS — E11.9 TYPE 2 DIABETES MELLITUS WITHOUT COMPLICATION, WITHOUT LONG-TERM CURRENT USE OF INSULIN (HCC): Primary | ICD-10-CM

## 2023-07-10 PROCEDURE — 99214 OFFICE O/P EST MOD 30 MIN: CPT | Performed by: FAMILY MEDICINE

## 2023-07-10 NOTE — ASSESSMENT & PLAN NOTE
Lab Results   Component Value Date    HGBA1C 6.0 (H) 07/05/2023   Stable continue present therapy, foot exam completed

## 2023-07-10 NOTE — PATIENT INSTRUCTIONS
Continue present therapy  If you do not receive Cologuard in the next 2 weeks please call my office  Diet exercise and weight loss to continue  Return in 6 months for office visit and blood work sooner if needed

## 2023-07-10 NOTE — PROGRESS NOTES
Name: Nando Limon      : 1975      MRN: [id number]  Encounter Provider: Wilton Pena DO  Encounter Date: 7/10/2023   Encounter department: [facility] PRIMARY CARE    Assessment & Plan     1. Type 2 diabetes, stable continue present therapy foot exam completed  2. ENMA continue CPAP follow sleep specialist  3. Transverse process fracture cervical spine, doing well asymptomatic  4. Hypercholesterolemia diet controlled  5. BMI 32.38 diet exercise weight loss recommended  6. Screening colon cancer, Cologuard is ordered  7. History of gastric bypass monitor blood work  8. Patient to return in 6 months for office visit and blood work sooner if needed      1. Type 2 diabetes mellitus without complication, without long-term current use of insulin (HCC)  Assessment & Plan:    Lab Results   Component Value Date    HGBA1C 6.0 (H) 2023   Stable continue present therapy, foot exam completed    Orders:  -     Albumin / creatinine urine ratio; Future; Expected date: 2024  -     Comprehensive metabolic panel; Future; Expected date: 2024  -     Hemoglobin A1C; Future; Expected date: 2024  -     CBC and Platelet; Future; Expected date: 2024  -     Lipid Panel with Direct LDL reflex; Future; Expected date: 2024  -     TSH, 3rd generation with Free T4 reflex; Future; Expected date: 2024    2. Screening for colon cancer  -     Cologuard  -     Albumin / creatinine urine ratio; Future; Expected date: 2024  -     Comprehensive metabolic panel; Future; Expected date: 2024  -     Hemoglobin A1C; Future; Expected date: 2024  -     CBC and Platelet; Future; Expected date: 2024  -     Lipid Panel with Direct LDL reflex; Future; Expected date: 2024  -     TSH, 3rd generation with Free T4 reflex; Future; Expected date: 2024    3.  ENMA on CPAP  Assessment & Plan:  Continue CPAP follow sleep specialist    Orders:  -     Albumin / creatinine urine ratio; Future; Expected date: 01/06/2024  -     Comprehensive metabolic panel; Future; Expected date: 01/06/2024  -     Hemoglobin A1C; Future; Expected date: 01/06/2024  -     CBC and Platelet; Future; Expected date: 01/06/2024  -     Lipid Panel with Direct LDL reflex; Future; Expected date: 01/06/2024  -     TSH, 3rd generation with Free T4 reflex; Future; Expected date: 01/06/2024    4. Closed fracture of transverse process of cervical vertebra, subsequent encounter  Assessment & Plan:  Symptom-free    Orders:  -     Albumin / creatinine urine ratio; Future; Expected date: 01/06/2024  -     Comprehensive metabolic panel; Future; Expected date: 01/06/2024  -     Hemoglobin A1C; Future; Expected date: 01/06/2024  -     CBC and Platelet; Future; Expected date: 01/06/2024  -     Lipid Panel with Direct LDL reflex; Future; Expected date: 01/06/2024  -     TSH, 3rd generation with Free T4 reflex; Future; Expected date: 01/06/2024    5. Pure hypercholesterolemia  Assessment & Plan:  Diet controlled    Orders:  -     Albumin / creatinine urine ratio; Future; Expected date: 01/06/2024  -     Comprehensive metabolic panel; Future; Expected date: 01/06/2024  -     Hemoglobin A1C; Future; Expected date: 01/06/2024  -     CBC and Platelet; Future; Expected date: 01/06/2024  -     Lipid Panel with Direct LDL reflex; Future; Expected date: 01/06/2024  -     TSH, 3rd generation with Free T4 reflex; Future; Expected date: 01/06/2024    6. Obesity (BMI 30-39. 9)  Assessment & Plan:  BMI 32.38 patient lost 6 pounds in the last 6 months continue diet exercise and weight loss    Orders:  -     Albumin / creatinine urine ratio; Future; Expected date: 01/06/2024  -     Comprehensive metabolic panel; Future; Expected date: 01/06/2024  -     Hemoglobin A1C; Future; Expected date: 01/06/2024  -     CBC and Platelet; Future; Expected date: 01/06/2024  -     Lipid Panel with Direct LDL reflex;  Future; Expected date: 01/06/2024  - TSH, 3rd generation with Free T4 reflex; Future; Expected date: 01/06/2024    7. Hx of gastric bypass  Assessment & Plan:  Continue to monitor blood work    Orders:  -     Albumin / creatinine urine ratio; Future; Expected date: 01/06/2024  -     Comprehensive metabolic panel; Future; Expected date: 01/06/2024  -     Hemoglobin A1C; Future; Expected date: 01/06/2024  -     CBC and Platelet; Future; Expected date: 01/06/2024  -     Lipid Panel with Direct LDL reflex; Future; Expected date: 01/06/2024  -     TSH, 3rd generation with Free T4 reflex; Future; Expected date: 01/06/2024        Depression Screening and Follow-up Plan: Patient was screened for depression during today's encounter. They screened negative with a PHQ-2 score of 0. Subjective      Patient doing well without any new medical complaints or concerns at the present time. Home blood sugars range from 1 10-1 40. Review of Systems   Constitutional: Negative for chills and fever. HENT: Negative for ear pain and sore throat. Eyes: Negative for pain and visual disturbance. Respiratory: Negative for cough and shortness of breath. Cardiovascular: Negative for chest pain and palpitations. Gastrointestinal: Negative for abdominal pain and vomiting. Endocrine:        HPI   Genitourinary: Negative for dysuria and hematuria. Musculoskeletal: Negative for arthralgias and back pain. Skin: Negative for color change and rash. Neurological: Negative for seizures and syncope. All other systems reviewed and are negative.       Current Outpatient Medications on File Prior to Visit   Medication Sig   • metFORMIN (GLUCOPHAGE) 1000 MG tablet Take 1 tablet (1,000 mg total) by mouth 2 (two) times a day with meals   • senna-docusate sodium (SENOKOT S) 8.6-50 mg per tablet Take 1 tablet by mouth 2 (two) times a day   • [DISCONTINUED] celecoxib (CeleBREX) 200 mg capsule Take 1 capsule (200 mg total) by mouth 2 (two) times a day   • [DISCONTINUED] lidocaine (Lidoderm) 5 % Apply 1 patch topically over 12 hours daily Remove & Discard patch within 12 hours or as directed by MD   • [DISCONTINUED] oxyCODONE (ROXICODONE) 5 immediate release tablet Take 5 mg by mouth every 4 (four) hours as needed       Objective     /70 (BP Location: Right arm, Patient Position: Sitting, Cuff Size: Adult)   Pulse 70   Temp (!) 97.2 °F (36.2 °C) (Temporal)   Resp 16   Ht 6' 1" (1.854 m)   Wt 111 kg (245 lb 6.4 oz)   BMI 32.38 kg/m²     Diabetic Foot Exam    Patient's shoes and socks removed. Right Foot/Ankle   Right Foot Inspection  Skin Exam: skin normal and skin intact. No dry skin, no warmth, no callus, no erythema, no maceration, no abnormal color, no pre-ulcer, no ulcer and no callus. Toe Exam: ROM and strength within normal limits. Sensory   Vibration: intact  Proprioception: intact  Monofilament testing: intact    Vascular  Capillary refills: < 3 seconds  The right DP pulse is 2+. The right PT pulse is 2+. Right Toe  - Comprehensive Exam  Arch: normal  Hammertoes: absent  Claw Toes: absent  Swelling: none   Tenderness: none         Left Foot/Ankle  Left Foot Inspection  Skin Exam: skin normal and skin intact. No dry skin, no warmth, no erythema, no maceration, normal color, no pre-ulcer, no ulcer and no callus. Toe Exam: ROM and strength within normal limits. Sensory   Vibration: intact  Proprioception: intact  Monofilament testing: intact    Vascular  Capillary refills: < 3 seconds  The left DP pulse is 2+. The left PT pulse is 2+. Left Toe  - Comprehensive Exam  Arch: normal  Hammertoes: absent  Claw toes: absent  Swelling: none   Tenderness: none           Assign Risk Category  No deformity present  No loss of protective sensation  No weak pulses  Risk: 0      Physical Exam  Vitals and nursing note reviewed. Constitutional:       Appearance: Normal appearance. HENT:      Head: Normocephalic and atraumatic.       Mouth/Throat: Mouth: Mucous membranes are moist.   Eyes:      General: No scleral icterus. Neck:      Vascular: No carotid bruit. Cardiovascular:      Rate and Rhythm: Normal rate and regular rhythm. Pulses: no weak pulses          Dorsalis pedis pulses are 2+ on the right side and 2+ on the left side. Posterior tibial pulses are 2+ on the right side and 2+ on the left side. Heart sounds: Normal heart sounds. Pulmonary:      Effort: Pulmonary effort is normal.      Breath sounds: Normal breath sounds. Abdominal:      General: Bowel sounds are normal.      Palpations: Abdomen is soft. Tenderness: There is no abdominal tenderness. Musculoskeletal:      Cervical back: Neck supple. Right lower leg: No edema. Left lower leg: No edema. Feet:      Right foot:      Skin integrity: No ulcer, skin breakdown, erythema, warmth, callus or dry skin. Left foot:      Skin integrity: No ulcer, skin breakdown, erythema, warmth, callus or dry skin. Skin:     General: Skin is warm and dry. Neurological:      General: No focal deficit present. Mental Status: He is alert.    Psychiatric:         Mood and Affect: Mood normal.       Pramod Jack DO

## 2023-08-11 LAB — COLOGUARD RESULT REPORTABLE: NEGATIVE

## 2023-10-10 PROBLEM — Z12.11 SCREENING FOR COLON CANCER: Status: RESOLVED | Noted: 2022-06-20 | Resolved: 2023-10-10

## 2023-10-26 DIAGNOSIS — E11.9 TYPE 2 DIABETES MELLITUS WITHOUT COMPLICATION, WITHOUT LONG-TERM CURRENT USE OF INSULIN (HCC): ICD-10-CM

## 2024-01-13 ENCOUNTER — APPOINTMENT (OUTPATIENT)
Dept: LAB | Facility: MEDICAL CENTER | Age: 49
End: 2024-01-13
Payer: COMMERCIAL

## 2024-01-13 DIAGNOSIS — S12.9XXD CLOSED FRACTURE OF TRANSVERSE PROCESS OF CERVICAL VERTEBRA, SUBSEQUENT ENCOUNTER: ICD-10-CM

## 2024-01-13 DIAGNOSIS — Z98.84 HX OF GASTRIC BYPASS: ICD-10-CM

## 2024-01-13 DIAGNOSIS — E78.00 PURE HYPERCHOLESTEROLEMIA: ICD-10-CM

## 2024-01-13 DIAGNOSIS — E66.9 OBESITY (BMI 30-39.9): ICD-10-CM

## 2024-01-13 DIAGNOSIS — G47.33 OSA ON CPAP: ICD-10-CM

## 2024-01-13 DIAGNOSIS — E11.9 TYPE 2 DIABETES MELLITUS WITHOUT COMPLICATION, WITHOUT LONG-TERM CURRENT USE OF INSULIN (HCC): ICD-10-CM

## 2024-01-13 DIAGNOSIS — Z12.11 SCREENING FOR COLON CANCER: ICD-10-CM

## 2024-01-13 LAB
ALBUMIN SERPL BCP-MCNC: 4.4 G/DL (ref 3.5–5)
ALP SERPL-CCNC: 80 U/L (ref 34–104)
ALT SERPL W P-5'-P-CCNC: 22 U/L (ref 7–52)
ANION GAP SERPL CALCULATED.3IONS-SCNC: 10 MMOL/L
AST SERPL W P-5'-P-CCNC: 27 U/L (ref 13–39)
BILIRUB SERPL-MCNC: 0.83 MG/DL (ref 0.2–1)
BUN SERPL-MCNC: 16 MG/DL (ref 5–25)
CALCIUM SERPL-MCNC: 9.6 MG/DL (ref 8.4–10.2)
CHLORIDE SERPL-SCNC: 105 MMOL/L (ref 96–108)
CHOLEST SERPL-MCNC: 143 MG/DL
CO2 SERPL-SCNC: 28 MMOL/L (ref 21–32)
CREAT SERPL-MCNC: 0.89 MG/DL (ref 0.6–1.3)
CREAT UR-MCNC: 143.2 MG/DL
ERYTHROCYTE [DISTWIDTH] IN BLOOD BY AUTOMATED COUNT: 12.6 % (ref 11.6–15.1)
EST. AVERAGE GLUCOSE BLD GHB EST-MCNC: 143 MG/DL
GFR SERPL CREATININE-BSD FRML MDRD: 99 ML/MIN/1.73SQ M
GLUCOSE P FAST SERPL-MCNC: 128 MG/DL (ref 65–99)
HBA1C MFR BLD: 6.6 %
HCT VFR BLD AUTO: 46.8 % (ref 36.5–49.3)
HDLC SERPL-MCNC: 44 MG/DL
HGB BLD-MCNC: 15.4 G/DL (ref 12–17)
LDLC SERPL CALC-MCNC: 79 MG/DL (ref 0–100)
MCH RBC QN AUTO: 30.7 PG (ref 26.8–34.3)
MCHC RBC AUTO-ENTMCNC: 32.9 G/DL (ref 31.4–37.4)
MCV RBC AUTO: 93 FL (ref 82–98)
MICROALBUMIN UR-MCNC: <7 MG/L
MICROALBUMIN/CREAT 24H UR: <5 MG/G CREATININE (ref 0–30)
PLATELET # BLD AUTO: 289 THOUSANDS/UL (ref 149–390)
PMV BLD AUTO: 10.5 FL (ref 8.9–12.7)
POTASSIUM SERPL-SCNC: 4.2 MMOL/L (ref 3.5–5.3)
PROT SERPL-MCNC: 6.6 G/DL (ref 6.4–8.4)
RBC # BLD AUTO: 5.01 MILLION/UL (ref 3.88–5.62)
SODIUM SERPL-SCNC: 143 MMOL/L (ref 135–147)
TRIGL SERPL-MCNC: 99 MG/DL
TSH SERPL DL<=0.05 MIU/L-ACNC: 3.28 UIU/ML (ref 0.45–4.5)
WBC # BLD AUTO: 6.24 THOUSAND/UL (ref 4.31–10.16)

## 2024-01-13 PROCEDURE — 84443 ASSAY THYROID STIM HORMONE: CPT

## 2024-01-13 PROCEDURE — 36415 COLL VENOUS BLD VENIPUNCTURE: CPT

## 2024-01-13 PROCEDURE — 85027 COMPLETE CBC AUTOMATED: CPT

## 2024-01-13 PROCEDURE — 80053 COMPREHEN METABOLIC PANEL: CPT

## 2024-01-13 PROCEDURE — 83036 HEMOGLOBIN GLYCOSYLATED A1C: CPT

## 2024-01-13 PROCEDURE — 82570 ASSAY OF URINE CREATININE: CPT

## 2024-01-13 PROCEDURE — 82043 UR ALBUMIN QUANTITATIVE: CPT

## 2024-01-13 PROCEDURE — 80061 LIPID PANEL: CPT

## 2024-01-15 ENCOUNTER — OFFICE VISIT (OUTPATIENT)
Dept: FAMILY MEDICINE CLINIC | Facility: CLINIC | Age: 49
End: 2024-01-15
Payer: COMMERCIAL

## 2024-01-15 VITALS
WEIGHT: 247 LBS | HEIGHT: 73 IN | DIASTOLIC BLOOD PRESSURE: 74 MMHG | SYSTOLIC BLOOD PRESSURE: 104 MMHG | BODY MASS INDEX: 32.74 KG/M2 | HEART RATE: 72 BPM

## 2024-01-15 DIAGNOSIS — E11.9 TYPE 2 DIABETES MELLITUS WITHOUT COMPLICATION, WITHOUT LONG-TERM CURRENT USE OF INSULIN (HCC): Primary | ICD-10-CM

## 2024-01-15 DIAGNOSIS — R63.5 WEIGHT GAIN FOLLOWING GASTRIC BYPASS SURGERY: ICD-10-CM

## 2024-01-15 DIAGNOSIS — Z98.84 HX OF GASTRIC BYPASS: ICD-10-CM

## 2024-01-15 DIAGNOSIS — G89.29 CHRONIC LEFT SHOULDER PAIN: ICD-10-CM

## 2024-01-15 DIAGNOSIS — S12.9XXD CLOSED FRACTURE OF TRANSVERSE PROCESS OF CERVICAL VERTEBRA, SUBSEQUENT ENCOUNTER: ICD-10-CM

## 2024-01-15 DIAGNOSIS — E78.00 PURE HYPERCHOLESTEROLEMIA: ICD-10-CM

## 2024-01-15 DIAGNOSIS — Z98.84 WEIGHT GAIN FOLLOWING GASTRIC BYPASS SURGERY: ICD-10-CM

## 2024-01-15 DIAGNOSIS — M72.2 PLANTAR FASCIITIS OF RIGHT FOOT: ICD-10-CM

## 2024-01-15 DIAGNOSIS — E66.9 OBESITY (BMI 30-39.9): ICD-10-CM

## 2024-01-15 DIAGNOSIS — Z12.5 SCREENING FOR PROSTATE CANCER: ICD-10-CM

## 2024-01-15 DIAGNOSIS — G47.33 OSA ON CPAP: ICD-10-CM

## 2024-01-15 DIAGNOSIS — M25.512 CHRONIC LEFT SHOULDER PAIN: ICD-10-CM

## 2024-01-15 PROBLEM — S12.9XXA CLOSED FRACTURE OF TRANSVERSE PROCESS OF CERVICAL VERTEBRA (HCC): Status: RESOLVED | Noted: 2023-06-22 | Resolved: 2024-01-15

## 2024-01-15 PROBLEM — Z00.00 HEALTHCARE MAINTENANCE: Status: ACTIVE | Noted: 2024-01-15

## 2024-01-15 PROCEDURE — 99214 OFFICE O/P EST MOD 30 MIN: CPT | Performed by: FAMILY MEDICINE

## 2024-01-15 RX ORDER — ROSUVASTATIN CALCIUM 5 MG/1
5 TABLET, COATED ORAL DAILY
Qty: 90 TABLET | Refills: 3 | Status: SHIPPED | OUTPATIENT
Start: 2024-01-15

## 2024-01-15 NOTE — PATIENT INSTRUCTIONS
Right foot pain I recommend arch supports by Ishaan or Dr. Luis's and change every 2 to 3 months  Use ice and stretching 2-3 times a day to feet  Left shoulder complete x-ray follow physical therapy follow with orthopedics  Diet exercise weight loss recommended  Start Crestor/rosuvastatin 5 mg 1 daily.  This will lower cholesterol and decrease your risk of cardiovascular disease  Return in 6 months for office visit and blood work sooner if needed

## 2024-01-15 NOTE — PROGRESS NOTES
Name: Ej Gastelum      : 1975      MRN: [id number]  Encounter Provider: Pramod Jack DO  Encounter Date: 1/15/2024   Encounter department: [facility] PRIMARY CARE    Assessment & Plan     1.  Type 2 diabetes, stable continue present therapy #2 pure ENMA continue CPAP follow with sleep specialist #3.  History of gastric bypass monitor blood work  4.  BMI 32.59 diet exercise weight loss recommended #6 pure hypercholesterolemia with history of diabetes even though diet controlled we will start Crestor 5 mg daily patient agrees  7.  Screening prostate cancer PSA ordered  8.  Weight gain following gastric bypass diet exercise weight loss recommended #9.  Transverse process cervical vertebral fracture, resolved  10.  Plantar fasciitis right foot I recommend ice and stretching patient use arch supports and change every 2 to 3 months  11.  Chronic left shoulder pain x-ray ordered physical therapy and orthopedic consultations ordered  12.  Return in 6 months for office visit and blood work sooner if needed        1. Type 2 diabetes mellitus without complication, without long-term current use of insulin (HCC)  Assessment & Plan:    Lab Results   Component Value Date    HGBA1C 6.6 (H) 2024   Stable continue present therapy    Orders:  -     rosuvastatin (CRESTOR) 5 mg tablet; Take 1 tablet (5 mg total) by mouth daily  -     Albumin / creatinine urine ratio; Future; Expected date: 07/15/2024  -     Comprehensive metabolic panel; Future; Expected date: 07/15/2024  -     Hemoglobin A1C; Future; Expected date: 07/15/2024  -     CBC and Platelet; Future; Expected date: 07/15/2024  -     Lipid Panel with Direct LDL reflex; Future; Expected date: 07/15/2024  -     TSH, 3rd generation with Free T4 reflex; Future; Expected date: 07/15/2024  -     PSA, Total Screen; Future; Expected date: 07/15/2024    2. ENMA on CPAP  Assessment & Plan:  Continue CPAP follow-up with sleep specialist    Orders:  -      Albumin / creatinine urine ratio; Future; Expected date: 07/15/2024  -     Comprehensive metabolic panel; Future; Expected date: 07/15/2024  -     Hemoglobin A1C; Future; Expected date: 07/15/2024  -     CBC and Platelet; Future; Expected date: 07/15/2024  -     Lipid Panel with Direct LDL reflex; Future; Expected date: 07/15/2024  -     TSH, 3rd generation with Free T4 reflex; Future; Expected date: 07/15/2024  -     PSA, Total Screen; Future; Expected date: 07/15/2024    3. Hx of gastric bypass  Assessment & Plan:  Monitor blood work    Orders:  -     Albumin / creatinine urine ratio; Future; Expected date: 07/15/2024  -     Comprehensive metabolic panel; Future; Expected date: 07/15/2024  -     Hemoglobin A1C; Future; Expected date: 07/15/2024  -     CBC and Platelet; Future; Expected date: 07/15/2024  -     Lipid Panel with Direct LDL reflex; Future; Expected date: 07/15/2024  -     TSH, 3rd generation with Free T4 reflex; Future; Expected date: 07/15/2024  -     PSA, Total Screen; Future; Expected date: 07/15/2024    4. Obesity (BMI 30-39.9)  Assessment & Plan:  BMI 32.59 diet exercise weight loss recommended    Orders:  -     Albumin / creatinine urine ratio; Future; Expected date: 07/15/2024  -     Comprehensive metabolic panel; Future; Expected date: 07/15/2024  -     Hemoglobin A1C; Future; Expected date: 07/15/2024  -     CBC and Platelet; Future; Expected date: 07/15/2024  -     Lipid Panel with Direct LDL reflex; Future; Expected date: 07/15/2024  -     TSH, 3rd generation with Free T4 reflex; Future; Expected date: 07/15/2024  -     PSA, Total Screen; Future; Expected date: 07/15/2024    5. Pure hypercholesterolemia  Assessment & Plan:  Diet controlled continue to monitor    Orders:  -     rosuvastatin (CRESTOR) 5 mg tablet; Take 1 tablet (5 mg total) by mouth daily  -     Albumin / creatinine urine ratio; Future; Expected date: 07/15/2024  -     Comprehensive metabolic panel; Future; Expected date:  07/15/2024  -     Hemoglobin A1C; Future; Expected date: 07/15/2024  -     CBC and Platelet; Future; Expected date: 07/15/2024  -     Lipid Panel with Direct LDL reflex; Future; Expected date: 07/15/2024  -     TSH, 3rd generation with Free T4 reflex; Future; Expected date: 07/15/2024  -     PSA, Total Screen; Future; Expected date: 07/15/2024    6. Screening for prostate cancer  Assessment & Plan:  PSA ordered    Orders:  -     Albumin / creatinine urine ratio; Future; Expected date: 07/15/2024  -     Comprehensive metabolic panel; Future; Expected date: 07/15/2024  -     Hemoglobin A1C; Future; Expected date: 07/15/2024  -     CBC and Platelet; Future; Expected date: 07/15/2024  -     Lipid Panel with Direct LDL reflex; Future; Expected date: 07/15/2024  -     TSH, 3rd generation with Free T4 reflex; Future; Expected date: 07/15/2024  -     PSA, Total Screen; Future; Expected date: 07/15/2024    7. Weight gain following gastric bypass surgery  -     Albumin / creatinine urine ratio; Future; Expected date: 07/15/2024  -     Comprehensive metabolic panel; Future; Expected date: 07/15/2024  -     Hemoglobin A1C; Future; Expected date: 07/15/2024  -     CBC and Platelet; Future; Expected date: 07/15/2024  -     Lipid Panel with Direct LDL reflex; Future; Expected date: 07/15/2024  -     TSH, 3rd generation with Free T4 reflex; Future; Expected date: 07/15/2024  -     PSA, Total Screen; Future; Expected date: 07/15/2024    8. Closed fracture of transverse process of cervical vertebra, subsequent encounter  Assessment & Plan:  Resolved    Orders:  -     Albumin / creatinine urine ratio; Future; Expected date: 07/15/2024  -     Comprehensive metabolic panel; Future; Expected date: 07/15/2024  -     Hemoglobin A1C; Future; Expected date: 07/15/2024  -     CBC and Platelet; Future; Expected date: 07/15/2024  -     Lipid Panel with Direct LDL reflex; Future; Expected date: 07/15/2024  -     TSH, 3rd generation with Free T4  reflex; Future; Expected date: 07/15/2024  -     PSA, Total Screen; Future; Expected date: 07/15/2024    9. Plantar fasciitis of right foot  -     Albumin / creatinine urine ratio; Future; Expected date: 07/15/2024  -     Comprehensive metabolic panel; Future; Expected date: 07/15/2024  -     Hemoglobin A1C; Future; Expected date: 07/15/2024  -     CBC and Platelet; Future; Expected date: 07/15/2024  -     Lipid Panel with Direct LDL reflex; Future; Expected date: 07/15/2024  -     TSH, 3rd generation with Free T4 reflex; Future; Expected date: 07/15/2024  -     PSA, Total Screen; Future; Expected date: 07/15/2024    10. Chronic left shoulder pain  -     XR shoulder 2+ vw left; Future; Expected date: 01/15/2024  -     Ambulatory Referral to Physical Therapy; Future  -     Ambulatory Referral to Orthopedic Surgery; Future  -     Albumin / creatinine urine ratio; Future; Expected date: 07/15/2024  -     Comprehensive metabolic panel; Future; Expected date: 07/15/2024  -     Hemoglobin A1C; Future; Expected date: 07/15/2024  -     CBC and Platelet; Future; Expected date: 07/15/2024  -     Lipid Panel with Direct LDL reflex; Future; Expected date: 07/15/2024  -     TSH, 3rd generation with Free T4 reflex; Future; Expected date: 07/15/2024  -     PSA, Total Screen; Future; Expected date: 07/15/2024           Subjective      Chief Complaint   Patient presents with   • Follow-up     Follow up to chronic conditions and review bw results.  mjs     • Diabetes     Due for Eye exam. He will get us a report from his eye doctor 6 months ago.   • Shoulder Pain     Left shoulder pain   • Foot Pain     Right foot pain in arch       Sugars have been mildly higher during the holidays but is now watching diet patient did gain 2 pounds since last office visit blood work was discussed.  Patient is having some right foot pain mainly first thing in the morning gets better as the day goes on patient is using arch supports and this is much  "improved.  Patient for the past 6 to 10 months having left shoulder pain off-and-on.  No history of trauma      Review of Systems   Constitutional: Negative.    HENT: Negative.     Eyes: Negative.    Respiratory: Negative.     Cardiovascular: Negative.    Gastrointestinal: Negative.    Endocrine:        HPI   Genitourinary: Negative.    Musculoskeletal:         HPI   Skin: Negative.    Allergic/Immunologic: Negative.    Neurological: Negative.    Hematological: Negative.    Psychiatric/Behavioral: Negative.         Current Outpatient Medications on File Prior to Visit   Medication Sig   • metFORMIN (GLUCOPHAGE) 1000 MG tablet Take 1 tablet (1,000 mg total) by mouth 2 (two) times a day with meals   • [DISCONTINUED] senna-docusate sodium (SENOKOT S) 8.6-50 mg per tablet Take 1 tablet by mouth 2 (two) times a day       Objective     /74   Pulse 72   Ht 6' 1\" (1.854 m)   Wt 112 kg (247 lb)   BMI 32.59 kg/m²     Physical Exam  Vitals and nursing note reviewed.   Constitutional:       Appearance: Normal appearance.   HENT:      Head: Normocephalic and atraumatic.      Mouth/Throat:      Mouth: Mucous membranes are moist.   Eyes:      General: No scleral icterus.  Neck:      Vascular: No carotid bruit.   Cardiovascular:      Rate and Rhythm: Normal rate and regular rhythm.      Heart sounds: Normal heart sounds.   Pulmonary:      Effort: Pulmonary effort is normal.      Breath sounds: Normal breath sounds.   Abdominal:      General: Bowel sounds are normal.      Palpations: Abdomen is soft.      Tenderness: There is no abdominal tenderness.   Musculoskeletal:         General: No tenderness or deformity.      Cervical back: Neck supple.      Comments: Left shoulder full range of motion with pain at full abduction negative point tenderness negative deformity  Right foot negative deformity negative tenderness   Skin:     General: Skin is warm and dry.   Neurological:      General: No focal deficit present.      " Mental Status: He is alert.   Psychiatric:         Mood and Affect: Mood normal.       Pramod Jack DO

## 2024-01-15 NOTE — ASSESSMENT & PLAN NOTE
Lab Results   Component Value Date    HGBA1C 6.6 (H) 01/13/2024   Stable continue present therapy

## 2024-01-16 ENCOUNTER — APPOINTMENT (OUTPATIENT)
Dept: RADIOLOGY | Facility: MEDICAL CENTER | Age: 49
End: 2024-01-16
Payer: COMMERCIAL

## 2024-01-16 ENCOUNTER — OFFICE VISIT (OUTPATIENT)
Dept: OBGYN CLINIC | Facility: MEDICAL CENTER | Age: 49
End: 2024-01-16
Payer: COMMERCIAL

## 2024-01-16 VITALS
BODY MASS INDEX: 32.47 KG/M2 | WEIGHT: 245 LBS | HEIGHT: 73 IN | HEART RATE: 76 BPM | SYSTOLIC BLOOD PRESSURE: 126 MMHG | DIASTOLIC BLOOD PRESSURE: 87 MMHG

## 2024-01-16 DIAGNOSIS — G89.29 CHRONIC LEFT SHOULDER PAIN: ICD-10-CM

## 2024-01-16 DIAGNOSIS — M77.8 TENDINITIS OF LEFT SHOULDER: Primary | ICD-10-CM

## 2024-01-16 DIAGNOSIS — M25.512 CHRONIC LEFT SHOULDER PAIN: ICD-10-CM

## 2024-01-16 PROCEDURE — 73030 X-RAY EXAM OF SHOULDER: CPT

## 2024-01-16 PROCEDURE — 99204 OFFICE O/P NEW MOD 45 MIN: CPT | Performed by: EMERGENCY MEDICINE

## 2024-01-16 RX ORDER — METHYLPREDNISOLONE 4 MG/1
TABLET ORAL
Qty: 1 EACH | Refills: 0 | Status: SHIPPED | OUTPATIENT
Start: 2024-01-16

## 2024-01-16 NOTE — PATIENT INSTRUCTIONS
While taking the oral steroid Medrol Dose Pack, do not take any NSAIDs such as Advil, Motrin, ibuprofen, Motrin, Aleve, naproxen, Celebrex or Meloxicam.  You can restart the NSAIDs after you finish the steroids.    However you may take Tylenol 500mg every 4-6 hours as needed OR max 1,000mg per dose up to 3 times per day for a total of 3,000mg per day  While taking oral steroids, you may experience mild side effects such as feeling jittery or flushing.  Please call if your side effects are significant or you have any questions.        Rotator Cuff Injury Exercises   AMBULATORY CARE:   What you need to know about rotator cuff injury exercises:  Exercises help improve shoulder movement and strength, and decrease pain. Your physical therapist or healthcare provider will tell you when to start doing the exercises. He or she will tell you how often to do them. You will need to start slowly to prevent more injury. You will move through several levels over time as you get stronger and more flexible.       Safety guidelines:   Always warm up before you do the exercises.  Walk or ride a stationary bike for 5 to 10 minutes to help you warm up.    Do not put your arm over your head until directed.  You will need to wait until your injury has healed. The movement of some exercises could continue until your arm is over your head. You will need to stop the movement where directed.    Stop if you feel pain.  You may feel some tight or stiff areas when you start. This should get better as you continue the exercises. You should not feel pain. Pain means you are not ready to do the exercise yet. Stop and call your physical therapist or healthcare provider right away.    Always work both rotator cuffs.  Even if your injury is only on 1 side, it is important to do each exercise on both sides. This helps prevent injury and maintain balance in your shoulders and back.    Use correct posture.  Your physical therapist or healthcare provider  will show you the proper posture for each exercise. You will be shown how to pull your shoulders back and down to engage the correct muscles. Remember not to let your shoulders shrug during an exercise unless it is part of the movement.    How to do stretching exercises:  You will not feel every exercise in your shoulder area. You may feel some of the stretches in your back, side, or upper arm muscles. You need to work muscles in and around your rotator cuffs and down your arms. This helps stabilize your shoulders. Your physical therapist or healthcare provider will tell you how long to hold each stretch. He or she will also tell you how many times to repeat each stretch during an exercise session. You may be told to do only certain exercises, or to do them in a specific order. The following are general directions to help you remember the exercises you are taught:  Pendulum swings:  Lean forward and rest your arm on a table. Do not round your back or lock your knees during the exercise. Let your other arm hang freely by your side. Gently swing your free arm forward and backward, side to side, and in circles.         Crossover arm stretch:  Relax your shoulders. Hold your upper arm with the opposite hand. Pull your arm across your chest until you feel a stretch. Hold the stretch. Return to the starting position.         Sleeper stretch:  Lie on your side on a firm, flat surface. Bend the elbow of your top arm 90°. Use your other hand to slowly push your arm down. Stop when you feel a stretch at the back of your shoulder. Hold the stretch. Slowly return to the starting position.         Shoulder movement, up and down:  This exercise may also be called shoulder extension. Sit in a chair that has a back but no armrests. Raise your arm like you are reaching for the wall in front of you. Continue to raise the arm until it is over your head, or as high as directed. Bring your arm back down to your side. Bring it back as  far as possible behind your body. Return your arm to the starting position.         Shoulder movement, side to side:  These movements may be called flexion, internal rotation, and external rotation. Sit in a chair that has a back but no armrests. Raise your arm to the side and then up over your head as far as directed. Return your arm to your side. Bring your arm across the front of your body and reach for the opposite shoulder. Return your arm to the starting position.         Shoulder rolls:  Stand and raise both shoulders toward your ears. Lower them to the starting position. Relax your shoulders. Pull your shoulders back. Then relax them again. Roll your shoulders in a smooth [place]. Then roll your shoulders in a smooth [place] in the other direction.         Wall reach exercise:  This may also be called a flexion stretch. Stand facing a wall. Slowly walk your fingers up the wall until you feel a stretch. Hold the stretch. Return to the starting position.         Arm reach exercise:  Lie on your back with your legs straight. Reach your arms like you are trying to touch the ceiling. Reach as high as you can so you feel a stretch in the back of your arms. Hold the stretch. Then lower your arms to your sides.         Elbow bends:  Stand with your arms down to your sides. Keep your palm facing forward. Bend your elbow and try to touch your shoulder with your fingertips. Return your arm to the starting position.         Up the back stretch:  Stand and put both arms behind your back. Put one hand under the other. Move the bottom hand and slowly push the upper hand up toward your head. You should feel a stretch in the front of your arm and shoulder. Be careful not to push too hard. Hold the stretch. Then return to the starting position.         Triceps stretch:  Stand and drop your forearm down your back so your hand is pointing to the ground behind you. Your elbow should be pointing at the ceiling. Take your other hand  and place it on your elbow. Gently and slowly push on the elbow until you feel a stretch in the back of your arm. Hold the stretch. Let go of your elbow and relax your arm. You may be shown how to do this stretch with a towel. The towel can be pulled gently with a hand behind your back at waist level.       How to do strengthening exercises:  Strengthening exercises may include handheld weights or resistance bands. Your physical therapist or healthcare provider will tell you how much weight or resistance to use. The general guide is to use light weights or low resistance and to do a high number of repetitions. You may be told to do only certain exercises, or to do them in a specific order. The following are general directions to help you remember the exercises you are taught:  Scapular squeeze:  Stand with your arms at your sides. Squeeze your shoulder blades together and hold this position. Then relax the muscles. Keep your shoulder back during the entire exercise.         Wall pushups:  This exercise is similar to a pushup done on the ground. The goal is to use your back and shoulder muscles to bring your upper body toward and away from the wall. Stand facing a wall. Put your hands on the wall. Bend your elbows to bring your upper body toward the wall. Straighten your arms to return to the starting position. Keep your feet close enough to the wall that you do not take a step when you bend your elbows.         Standing row with exercise band:  Wrap the exercise band around a heavy, stable object at waist level. Make loop in the end of the band to create a handle, if needed. Hold the handle or loop and pull the band straight back toward your hip. Keep your shoulder down. Squeeze your shoulder blade. Hold this position. Then slowly return to the starting position.         External rotation with arm abducted 90 degrees:  Wrap the exercise band around a heavy, stable object at waist level. Make loop in the end of the  band to create a handle, if needed. Stand and hold the handle or loop. Bend your elbow and raise your arm to shoulder height. Keep your arm in this position. Raise your hand like you are pointing at the ceiling. Slowly return to the starting position. You may also be shown how to do this exercise lying down and with a weight.         Shoulder abduction with weight:  Stand and hold a weight in your hand with your palm facing your body. Slowly raise your arm to the side with your thumb pointing up. Then raise your arm as far as you can without pain. Hold this position. Then return to the starting position.         Shoulder abduction with exercise band:  Wrap the exercise band around a heavy, stable object near your foot. Grab the band. Keep your arm straight. Slowly raise your arm to the side with your thumb pointing up. Then, slowly pull the band as far as you can without pain. Slowly return to the starting position.         Shoulder adduction with weight:  Lie on your back on a firm surface. Hold a weight in your hand at your shoulder. Slowly raise your arm toward the ceiling and straighten your elbow. Hold this position. Then slowly return to the starting position.         Shoulder adduction with exercise band:  Wrap the exercise band around a heavy, stable object. Stand and face away from where the band is anchored. Hold each end of the band in both hands with your elbows bent. Your elbows should not be behind your body. Keep your arms parallel to the floor and slowly straighten your elbows. Hold this position. Slowly return to the starting position.       Call your doctor or physical therapist if:   You have sharp or worsening pain during exercise or at rest.    You have questions or concerns about your rotator cuff injury exercises.    © Copyright [employer] 2023 Information is for End User's use only and may not be sold, redistributed or otherwise used for commercial purposes.  The above information is an   only. It is not intended as medical advice for individual conditions or treatments. Talk to your doctor, nurse or pharmacist before following any medical regimen to see if it is safe and effective for you.

## 2024-01-16 NOTE — PROGRESS NOTES
Assessment/Plan:    Diagnoses and all orders for this visit:    Tendinitis of left shoulder    Chronic left shoulder pain  -     Ambulatory Referral to Orthopedic Surgery  -     methylPREDNISolone 4 MG tablet therapy pack; Use as directed on package  -     Ambulatory Referral to Physical Therapy; Future    1 year of left shoulder pain reviewed x-ray images showing mild degenerative changes of the AC joint.  This point in time recommend Medrol Dosepak as he is unable to take NSAIDs.  Recommend formal physical therapy working on strengthening the rotator cuff and periscapular muscles.  Given chronicity if no improvement to consider MRI versus subacromial steroid injection    Return in about 6 weeks (around 2/27/2024).      Subjective:   Patient ID: Ej Gastelum is a 49 y.o. male.    New patient significant history of adhesive capsulitis of the right shoulder presents for chronic left shoulder pain ongoing for approximately a year denies any injury at onset.  Pain is across the top of and anterior shoulder worse with abduction movements and overhead reaching as well as reaching behind.  He is unable to take NSAIDs given history of gastric bypass surgery        Review of Systems    The following portions of the patient's chart were reviewed and updated as appropriate:   Allergy:    Allergies   Allergen Reactions    Nsaids      Hx gastric bypass       Medications:    Current Outpatient Medications:     metFORMIN (GLUCOPHAGE) 1000 MG tablet, Take 1 tablet (1,000 mg total) by mouth 2 (two) times a day with meals, Disp: 180 tablet, Rfl: 1    methylPREDNISolone 4 MG tablet therapy pack, Use as directed on package, Disp: 1 each, Rfl: 0    rosuvastatin (CRESTOR) 5 mg tablet, Take 1 tablet (5 mg total) by mouth daily, Disp: 90 tablet, Rfl: 3    Patient Active Problem List   Diagnosis    Weight gain following gastric bypass surgery    Cholelithiasis    Type 2 diabetes mellitus without complication, without long-term current  "use of insulin (HCC)    Pure hypercholesterolemia    Obesity (BMI 30-39.9)    Allergic rhinitis    Chronic hoarseness    Screening for colon cancer    ENMA on CPAP    Pulmonary air trapping    Hx of gastric bypass    Screening for prostate cancer    Healthcare maintenance       Objective:  /87   Pulse 76   Ht 6' 1\" (1.854 m)   Wt 111 kg (245 lb)   BMI 32.32 kg/m²     Right Shoulder Exam     Range of Motion   Active abduction:  abnormal       Left Shoulder Exam     Tenderness   The patient is experiencing tenderness in the biceps tendon.    Range of Motion   Active abduction:  abnormal   External rotation:  normal   Internal rotation 0 degrees:  abnormal     Muscle Strength   External rotation: 5/5   Supraspinatus: 5/5   Subscapularis: 5/5     Tests   Drop arm: negative     Comments:  Negative AC sign, negative Yergason's            Physical Exam      Neurologic Exam    Procedures    I have personally reviewed pertinent films in PACS and my interpretation is X-ray left shoulder no acute fracture or dislocation no significant degenerative changes.            Past Medical History:   Diagnosis Date    Allergic rhinitis     Diabetes (HCC)     Diabetes mellitus (HCC)     Obesity        Past Surgical History:   Procedure Laterality Date    GASTRIC BYPASS  2008    gastric surgery for morbid obesity-last assessed-9/19/2017    VASECTOMY      surgery vas deferens-last assessed-9/19/2017       Social History     Socioeconomic History    Marital status: Unknown     Spouse name: Not on file    Number of children: Not on file    Years of education: Not on file    Highest education level: Not on file   Occupational History     Comment: employed   Tobacco Use    Smoking status: Never    Smokeless tobacco: Never   Vaping Use    Vaping status: Never Used   Substance and Sexual Activity    Alcohol use: Yes     Alcohol/week: 6.0 standard drinks of alcohol     Types: 4 Cans of beer, 2 Shots of liquor per week     Comment: social "    Drug use: Never    Sexual activity: Yes     Partners: Female     Birth control/protection: Male Sterilization, Female Sterilization   Other Topics Concern    Not on file   Social History Narrative    CONSUMES 3 CUPS OF COFFEE AND 1 MONSTER DRINK PER DAY     Social Determinants of Health     Financial Resource Strain: Not on file   Food Insecurity: Not on file   Transportation Needs: Not on file   Physical Activity: Not on file   Stress: Not on file   Social Connections: Not on file   Intimate Partner Violence: Not on file   Housing Stability: Not on file       Family History   Problem Relation Age of Onset    Leukemia Mother     Cancer Mother     Heart attack Father         acute MI, initial episode of care    Depression Father         with anxiety    Stroke Father         CVA    Hypertension Father     Diabetes type I Father

## 2024-02-14 ENCOUNTER — EVALUATION (OUTPATIENT)
Dept: PHYSICAL THERAPY | Facility: REHABILITATION | Age: 49
End: 2024-02-14
Payer: COMMERCIAL

## 2024-02-14 DIAGNOSIS — M25.512 CHRONIC LEFT SHOULDER PAIN: Primary | ICD-10-CM

## 2024-02-14 DIAGNOSIS — G89.29 CHRONIC LEFT SHOULDER PAIN: Primary | ICD-10-CM

## 2024-02-14 PROCEDURE — 97161 PT EVAL LOW COMPLEX 20 MIN: CPT

## 2024-02-14 PROCEDURE — 97110 THERAPEUTIC EXERCISES: CPT

## 2024-02-14 NOTE — PROGRESS NOTES
PT Evaluation     Today's date: 2024  Patient name: Ej Gastelum  : 1975  MRN: [id number]  Referring provider: Bassem De Souza MD  Dx:   Encounter Diagnosis     ICD-10-CM    1. Chronic left shoulder pain  M25.512 Ambulatory Referral to Physical Therapy    G89.29           Start Time: 1030  Stop Time: 1115  Total time in clinic (min): 45 minutes    Assessment  Assessment details: Patient is a 49 y.o. male presenting to initial examination with chief complaint of chronic L shoulder pain. Signs and symptoms are consistent with referred diagnosis with likely tendonitis of the supraspinatus. Primary impairments include painful/restricted shoulder ROM, poor seated/standing posture, gross weakness of the B/L UEs, and hypertonicity of the B/L UT/LS + pectoral musculature . As a result of impairments patient experiences limitations with functional/daily activities including those listed below. Educated patient regarding plan of care and answered all patient questions to patient satisfaction. Patient would benefit from skilled PT interventions to address above impairments, achieve goals, and to maximize function. Thank you for the referral.    Impairments: abnormal muscle tone, abnormal or restricted ROM, impaired physical strength, lacks appropriate home exercise program, pain with function, poor posture  and poor body mechanics  Functional limitations: reaching overhead, reaching behind back, sleeping on L side  Symptom irritability: moderateUnderstanding of Dx/Px/POC: good   Prognosis: good    Goals  Impairment Goals: 4-6 weeks  - Patient to decrease pain to 1-2/10  - Patient to improve shoulder AROM to equal to uninvolved side in all planes  - Patient to increase shoulder strength to 5/5 throughout    Functional Goals: by discharge  - Patient to discharge to independent [facility]  - Patient to return to prior level of function  - Patient to be able to reach overhead without increased pain/compensation/difficulty  -  Patient to be able to reach behind back without increased pain/compensation/difficulty   - Patient to improve tolerance to sleeping on L side without increased pain or difficulty         Plan  Patient would benefit from: skilled physical therapy  Referral necessary: No  Planned therapy interventions: joint mobilization, manual therapy, abdominal trunk stabilization, body mechanics training, nerve gliding, neuromuscular re-education, patient education, postural training, stretching, strengthening, therapeutic activities, therapeutic exercise, home exercise program and functional ROM exercises  Frequency: 1x week  Duration in visits: 4  Duration in weeks: 4  Plan of Care beginning date: 2024  Plan of Care expiration date: 3/15/2024  Treatment plan discussed with: patient      Subjective Evaluation    History of Present Illness  Mechanism of injury: Ej reports to PT with complaints of chronic L shoulder pain  Pain off and on in the L shoulder for about a year and over the last 6 months has been constant     Was seen by ortho where they did xrays and didn't reveal anything significant.  Was referred to PT.  Pain usually activity related and usually goes away after the activity.  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)              Recurrent probem    Quality of life: good    Patient Goals  Patient goals for therapy: return to sport/leisure activities, independence with ADLs/IADLs, increased strength, increased motion, decreased pain and return to work    Pain  Current pain ratin  At best pain ratin  At worst pain ratin  Quality: dull ache  Aggravating factors: overhead activity  Progression: no change    Hand dominance: right    Treatments  No previous or current  treatments        Objective     Postural Observations  Seated posture: poor  Standing posture: fair  Correction of posture: has no consistent effect      Palpation   Left   No palpable tenderness to the biceps and middle deltoid.   Hypertonic in the levator scapulae, pectoralis major and upper trapezius.   Tenderness of the anterior deltoid and upper trapezius.     Right   Hypertonic in the levator scapulae, pectoralis major and upper trapezius.     Tenderness     Left Shoulder   Tenderness in the AC joint and supraspinatus tendon. No tenderness in the acromion, biceps tendon (proximal) and subscapularis tendon.     Active Range of Motion   Left Shoulder   Flexion: 140 degrees with pain  Abduction: 105 degrees with pain  External rotation 0°: 45 degrees     Right Shoulder   Flexion: 165 degrees   Abduction: 155 degrees   External rotation 0°: 65 degrees     Additional Active Range of Motion Details  Pain 9/10 with shoulder ABD  Pain 5/10 with shoulder FLEX    Strength/Myotome Testing     Left Shoulder     Planes of Motion   Flexion: 4+   Abduction: 4   External rotation at 0°: 4   Internal rotation at 0°: 5     Isolated Muscles   Lower trapezius: 4-   Middle trapezius: 4-   Serratus anterior: 4     Right Shoulder     Planes of Motion   Flexion: 5   Abduction: 5   External rotation at 0°: 4+   Internal rotation at 0°: 5     Isolated Muscles   Lower trapezius: 4-   Middle trapezius: 4-   Serratus anterior: 4     Tests     Left Shoulder   Positive Hawkin's and painful arc.   Negative belly press, drop arm, external rotation lag sign and internal rotation lag sign.              Precautions: N/A      Manuals 2/14                                                                Neuro Re-Ed             B/L ER vs TB HEP            Prone Ts HEP            Rows vs TB PLAN            Pulldowns vs TB PLAN            Lower trap Set @ Wall PLAN                                      Ther Ex             Seated Thoracic EXT HEP             Doorway Pec Stretch HEP            ER Stretch at wall PLAN                                                                             Ther Activity                                       Gait Training                                       Modalities

## 2024-02-19 ENCOUNTER — OFFICE VISIT (OUTPATIENT)
Dept: PHYSICAL THERAPY | Facility: REHABILITATION | Age: 49
End: 2024-02-19
Payer: COMMERCIAL

## 2024-02-19 DIAGNOSIS — G89.29 CHRONIC LEFT SHOULDER PAIN: Primary | ICD-10-CM

## 2024-02-19 DIAGNOSIS — M25.512 CHRONIC LEFT SHOULDER PAIN: Primary | ICD-10-CM

## 2024-02-19 PROCEDURE — 97112 NEUROMUSCULAR REEDUCATION: CPT

## 2024-02-19 PROCEDURE — 97110 THERAPEUTIC EXERCISES: CPT

## 2024-02-19 NOTE — PROGRESS NOTES
"Daily Note     Today's date: 2024  Patient name: Ej Gastelum  : 1975  MRN: [id number]  Referring provider: Bassem De Souza MD  Dx:   Encounter Diagnosis     ICD-10-CM    1. Chronic left shoulder pain  M25.512     G89.29           Start Time: 0850  Stop Time: 0935  Total time in clinic (min): 45 minutes    Subjective: Ej reports that things are going well. He's been doing his best to do his HEP despite being busy with work.  Notes that he had a bit of a slip and fall at work where he reached his arm up suddenly and this increased pain but went away soon after.      Objective: See treatment diary below      Assessment: Tolerated treatment well. Patient demonstrated fatigue post treatment, exhibited good technique with therapeutic exercises, and would benefit from continued PT.  Patient capable of initiating scapular/postural strengthening today with appropriate symptom response.  Exercises reviewed in clinic reflected in HEP and patient educated regarding proper dosage/symptom response.  Will continue to progress as tolerated.      Plan: Continue per plan of care.      Precautions: N/A      Manuals                                                                Neuro Re-Ed             B/L ER vs TB HEP            Prone Ts HEP            [place]rah Rows PLAN 3x10 (#20)           Pulldowns vs TB PLAN 2x10 (#15)           Lower trap Set @ Wall PLAN HOLD                                     Ther Ex             Arm Bike  4'/4'           Seated Thoracic EXT HEP            Doorway Pec Stretch HEP 3x30\"           ER Stretch at wall PLAN 3x30\"           Wall Slide Flexion/ABD  10\"x10ea                                                               Ther Activity                                       Gait Training                                       Modalities                                            "

## 2024-02-21 PROBLEM — Z00.00 HEALTHCARE MAINTENANCE: Status: RESOLVED | Noted: 2024-01-15 | Resolved: 2024-02-21

## 2024-02-21 PROBLEM — Z12.11 SCREENING FOR COLON CANCER: Status: RESOLVED | Noted: 2022-06-20 | Resolved: 2024-02-21

## 2024-02-21 PROBLEM — Z12.5 SCREENING FOR PROSTATE CANCER: Status: RESOLVED | Noted: 2024-01-15 | Resolved: 2024-02-21

## 2024-02-26 ENCOUNTER — APPOINTMENT (OUTPATIENT)
Dept: PHYSICAL THERAPY | Facility: REHABILITATION | Age: 49
End: 2024-02-26
Payer: COMMERCIAL

## 2024-03-04 ENCOUNTER — OFFICE VISIT (OUTPATIENT)
Dept: PHYSICAL THERAPY | Facility: REHABILITATION | Age: 49
End: 2024-03-04
Payer: COMMERCIAL

## 2024-03-04 DIAGNOSIS — M25.512 CHRONIC LEFT SHOULDER PAIN: Primary | ICD-10-CM

## 2024-03-04 DIAGNOSIS — G89.29 CHRONIC LEFT SHOULDER PAIN: Primary | ICD-10-CM

## 2024-03-04 PROCEDURE — 97112 NEUROMUSCULAR REEDUCATION: CPT

## 2024-03-04 PROCEDURE — 97110 THERAPEUTIC EXERCISES: CPT

## 2024-03-04 NOTE — PROGRESS NOTES
"Daily Note     Today's date: 3/4/2024  Patient name: Ej Gastelum  : 1975  MRN: [id number]  Referring provider: Bassem De Souza MD  Dx:   Encounter Diagnosis     ICD-10-CM    1. Chronic left shoulder pain  M25.512     G89.29           Start Time: 0900  Stop Time: 0945  Total time in clinic (min): 45 minutes    Subjective: Ej reports that things are feeling better.  States that he had a little soreness with the weather over the weekend but apart from that has noticed an improvement in ROM and pain.    Objective: See treatment diary below    Assessment: Tolerated treatment well. Patient demonstrated fatigue post treatment, exhibited good technique with therapeutic exercises, and would benefit from continued PT.  Patient continues to respond well to current program and demonstrates improved postural control and reduced compensatory shrugging throughout scapular strengthening interventions. Capable of progressing resistance for cable exercises without loss of form. Plan to re-assess at time of NV      Plan: Progress note during next visit.      Precautions: N/A      Manuals  3/                                                              Neuro Re-Ed             B/L ER vs TB HEP            Prone Ts HEP  3x10 (#2)          Prone Y's   2x10          [place]oring Rows PLAN 3x10 (#20) 3x10 (#25)          Pulldowns vs TB PLAN 2x10 (#15) 2x10 (#20)          Lower trap Set @ Wall PLAN HOLD           Face Pulls vs TB   3x10 (GTB)                       Ther Ex             Arm Bike  4'/4' 4'/4'          Seated Thoracic EXT HEP            Doorway Pec Stretch HEP 3x30\" 3x30\"          ER Stretch at wall PLAN 3x30\"           Wall Slide Flexion/ABD  10\"x10ea           Functional IR Stretch w/ Strap   10\"x10                                                 Ther Activity                                       Gait Training                                       Modalities                                            "

## 2024-03-05 ENCOUNTER — OFFICE VISIT (OUTPATIENT)
Dept: OBGYN CLINIC | Facility: MEDICAL CENTER | Age: 49
End: 2024-03-05
Payer: COMMERCIAL

## 2024-03-05 VITALS — WEIGHT: 245 LBS | HEIGHT: 73 IN | BODY MASS INDEX: 32.47 KG/M2

## 2024-03-05 DIAGNOSIS — M25.512 CHRONIC LEFT SHOULDER PAIN: Primary | ICD-10-CM

## 2024-03-05 DIAGNOSIS — G89.29 CHRONIC LEFT SHOULDER PAIN: Primary | ICD-10-CM

## 2024-03-05 DIAGNOSIS — M77.8 TENDINITIS OF LEFT SHOULDER: ICD-10-CM

## 2024-03-05 PROCEDURE — 99213 OFFICE O/P EST LOW 20 MIN: CPT | Performed by: EMERGENCY MEDICINE

## 2024-03-05 NOTE — PROGRESS NOTES
Assessment/Plan:    Diagnoses and all orders for this visit:    Chronic left shoulder pain    Tendinitis of left shoulder    Improving s/p Medrol pack and PT/HEP  Ej would like to continue current treatment at this time given his improvement.  If continued symptoms to consider subacromial CSI.  Reviewed PT notes    No follow-ups on file.      Subjective:   Patient ID: Ej Gastelum is a 49 y.o. male.    Ej returns with improvement of symptoms s/p Medrol pack, he has been to PT 3 weeks (1 per week).  He notes improved ROM.  No pain yesterday minus random reaching, and also notes some pain in the morning.    Hx adhesive capsulitis Right shoulder    Initial note:  1 year of left shoulder pain reviewed x-ray images showing mild degenerative changes of the AC joint.  This point in time recommend Medrol Dosepak as he is unable to take NSAIDs.  Recommend formal physical therapy working on strengthening the rotator cuff and periscapular muscles.  Given chronicity if no improvement to consider MRI versus subacromial steroid injection        Review of Systems    The following portions of the patient's chart were reviewed and updated as appropriate:   Allergy:    Allergies   Allergen Reactions    Nsaids      Hx gastric bypass       Medications:    Current Outpatient Medications:     metFORMIN (GLUCOPHAGE) 1000 MG tablet, Take 1 tablet (1,000 mg total) by mouth 2 (two) times a day with meals, Disp: 180 tablet, Rfl: 1    rosuvastatin (CRESTOR) 5 mg tablet, Take 1 tablet (5 mg total) by mouth daily, Disp: 90 tablet, Rfl: 3    methylPREDNISolone 4 MG tablet therapy pack, Use as directed on package, Disp: 1 each, Rfl: 0    Patient Active Problem List   Diagnosis    Weight gain following gastric bypass surgery    Cholelithiasis    Type 2 diabetes mellitus without complication, without long-term current use of insulin ([facility])    Pure hypercholesterolemia    Obesity (BMI 30-39.9)    Allergic rhinitis    Chronic hoarseness    ENMA  "on CPAP    Pulmonary air trapping    Hx of gastric bypass       Objective:  Ht 6' 1\" (1.854 m)   Wt 111 kg (245 lb)   BMI 32.32 kg/m²     Left Shoulder Exam     Range of Motion   Active abduction:  abnormal   External rotation:  normal     Muscle Strength   External rotation: 5/5   Supraspinatus: 5/5     Tests   Drop arm: negative             Physical Exam      Neurologic Exam    Procedures    I have personally reviewed the written report of the pertinent studies. Xray Left Shoulder            Past Medical History:   Diagnosis Date    Allergic rhinitis     Diabetes (HCC)     Diabetes mellitus (HCC)     Obesity        Past Surgical History:   Procedure Laterality Date    GASTRIC BYPASS  2008    gastric surgery for morbid obesity-last assessed-9/19/2017    VASECTOMY      surgery vas deferens-last assessed-9/19/2017       Social History     Socioeconomic History    Marital status: Unknown     Spouse name: Not on file    Number of children: Not on file    Years of education: Not on file    Highest education level: Not on file   Occupational History     Comment: employed   Tobacco Use    Smoking status: Never    Smokeless tobacco: Never   Vaping Use    Vaping status: Never Used   Substance and Sexual Activity    Alcohol use: Yes     Alcohol/week: 6.0 standard drinks of alcohol     Types: 4 Cans of beer, 2 Shots of liquor per week     Comment: social    Drug use: Never    Sexual activity: Yes     Partners: Female     Birth control/protection: Male Sterilization, Female Sterilization   Other Topics Concern    Not on file   Social History Narrative    CONSUMES 3 CUPS OF COFFEE AND 1 MONSTER DRINK PER DAY     Social Determinants of Health     Financial Resource Strain: Not on file   Food Insecurity: Not on file   Transportation Needs: Not on file   Physical Activity: Not on file   Stress: Not on file   Social Connections: Not on file   Intimate Partner Violence: Not on file   Housing Stability: Not on file       Family " History   Problem Relation Age of Onset    Leukemia Mother     Cancer Mother     Heart attack Father         acute MI, initial episode of care    Depression Father         with anxiety    Stroke Father         CVA    Hypertension Father     Diabetes type I Father

## 2024-03-11 ENCOUNTER — EVALUATION (OUTPATIENT)
Dept: PHYSICAL THERAPY | Facility: REHABILITATION | Age: 49
End: 2024-03-11
Payer: COMMERCIAL

## 2024-03-11 DIAGNOSIS — G89.29 CHRONIC LEFT SHOULDER PAIN: Primary | ICD-10-CM

## 2024-03-11 DIAGNOSIS — M25.512 CHRONIC LEFT SHOULDER PAIN: Primary | ICD-10-CM

## 2024-03-11 PROCEDURE — 97110 THERAPEUTIC EXERCISES: CPT

## 2024-03-11 PROCEDURE — 97112 NEUROMUSCULAR REEDUCATION: CPT

## 2024-03-11 NOTE — PROGRESS NOTES
PT Re-Evaluation     Today's date: 3/11/2024  Patient name: Ej Gastelum  : 1975  MRN: [id number]  Referring provider: Bassem De Souza MD  Dx:   Encounter Diagnosis     ICD-10-CM    1. Chronic left shoulder pain  M25.512     G89.29             Start Time: 0900  Stop Time: 0945  Total time in clinic (min): 45 minutes    Assessment  Assessment details: Patient is a 49 y.o. male presenting to initial examination with chief complaint of chronic L shoulder pain. Patient exhibits favorable progress toward objective and functional goals at time of reexamination. Patient exhibits improvements with shoulder ROM in all planes, improved shoulder/scapular strength, and improved posture since initiating PT.  At this time, patient will be placed on 3-week hold to assess efficacy of independent HEP in management of current symptoms.  Patient educated regarding HEP as well as self progressions and questions answered to patient satisfaction.  Plan to contact patient at end of hold period to determine if skilled services are still required. Good prognosis.  Impairments: abnormal muscle tone, abnormal or restricted ROM, impaired physical strength, lacks appropriate home exercise program, pain with function, poor posture  and poor body mechanics  Functional limitations: reaching overhead, reaching behind back, sleeping on L side  Symptom irritability: moderateUnderstanding of Dx/Px/POC: good   Prognosis: good    Goals  Impairment Goals: 4-6 weeks  - Patient to decrease pain to 1-2/10 (50% MET)  - Patient to improve shoulder AROM to equal to uninvolved side in all planes (75% MET)  - Patient to increase shoulder strength to 5/5 throughout (50% MET)    Functional Goals: by discharge  - Patient to discharge to independent HEP (NOT MET)  - Patient to return to prior level of function (NOT MET)  - Patient to be able to reach overhead without increased pain/compensation/difficulty (80% MET)  - Patient to be able to reach behind back  without increased pain/compensation/difficulty (60% MET)  - Patient to improve tolerance to sleeping on L side without increased pain or difficulty (80% MET)        Plan  Patient would benefit from: skilled physical therapy  Referral necessary: No  Planned therapy interventions: joint mobilization, manual therapy, abdominal trunk stabilization, body mechanics training, nerve gliding, neuromuscular re-education, patient education, postural training, stretching, strengthening, therapeutic activities, therapeutic exercise, home exercise program and functional ROM exercises  Plan of Care beginning date: 3/11/2024  Treatment plan discussed with: patient      Subjective Evaluation    History of Present Illness  Mechanism of injury: Ej reports that he has made improvements since first starting PT.  He describes the following improvement and continued difficulties:    - duration of pain after doing something aggravating goes away almost immediately  - pain lingers the longest after IR stretch but even this has improved   - able to reach higher now without symptoms.  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  - Reaching overhead/reaching back are still the aggravating factors (improved) but those are the main challenges now when symptoms present  - is no longer woken up by shoulder pain though wakes up in the morning with some slight increased soreness  - would be interested in moving forward with his HEP as he feels like it has been helping him Improve steadily since starting PT where he doesn't feel the need to get any injections in the shoulder          Recurrent probem    Quality of life: good    Patient Goals  Patient goals for therapy: return to sport/leisure activities, independence with ADLs/IADLs, increased strength, increased motion, decreased pain and return to work    Pain  Current pain ratin  At best pain ratin  At worst pain ratin  Quality: dull  ache  Aggravating factors: overhead activity  Progression: improved    Hand dominance: right    Treatments  No previous or current treatments        Objective     Postural Observations  Seated posture: fair  Standing posture: fair  Correction of posture: has no consistent effect      Palpation   Left   No palpable tenderness to the biceps and middle deltoid.   Hypertonic in the levator scapulae, pectoralis major and upper trapezius.   Tenderness of the anterior deltoid and upper trapezius.     Right   Hypertonic in the levator scapulae, pectoralis major and upper trapezius.     Tenderness     Left Shoulder   Tenderness in the AC joint and supraspinatus tendon. No tenderness in the acromion, biceps tendon (proximal) and subscapularis tendon.     Active Range of Motion   Left Shoulder   Flexion: 150 degrees with pain  Abduction: 145 degrees with pain  External rotation 0°: 60 degrees     Right Shoulder   Flexion: 165 degrees   Abduction: 155 degrees   External rotation 0°: 65 degrees     Additional Active Range of Motion Details  Pain 5/10 with shoulder ABD  Pain 5/10 with shoulder FLEX      Functional IR:  R UE: T10  L UE: T12    Strength/Myotome Testing     Left Shoulder     Planes of Motion   Flexion: 5   Abduction: 4+   External rotation at 0°: 4+   Internal rotation at 0°: 5     Isolated Muscles   Lower trapezius: 4   Middle trapezius: 4   Serratus anterior: 5     Right Shoulder     Planes of Motion   Flexion: 5   Abduction: 5   External rotation at 0°: 4+   Internal rotation at 0°: 5     Isolated Muscles   Lower trapezius: 4   Middle trapezius: 4   Serratus anterior: 5     Tests     Left Shoulder   Positive Hawkin's.   Negative belly press, drop arm, external rotation lag sign, internal rotation lag sign and painful arc.              Precautions: N/A      Manuals 2/14 2/19 3/4 3/11                                                             Neuro Re-Ed    RE-EVAL         B/L ER vs TB HEP            Prone Ts HEP   "3x10 (#2)          Prone Y's   2x10          Farrah Rows PLAN 3x10 (#20) 3x10 (#25)          Pulldowns vs TB PLAN 2x10 (#15) 2x10 (#20)          Lower trap Set @ Wall PLAN HOLD           Face Pulls vs TB   3x10 (GTB)                       Ther Ex             Arm Bike  4'/4' 4'/4' 4'/4'         Seated Thoracic EXT HEP            Doorway Pec Stretch HEP 3x30\" 3x30\"          ER Stretch at wall PLAN 3x30\"           Wall Slide Flexion/ABD  10\"x10ea           Functional IR Stretch w/ Strap   10\"x10                                                 Ther Activity                                       Gait Training                                       Modalities                                              "

## 2024-04-01 ENCOUNTER — OFFICE VISIT (OUTPATIENT)
Dept: SLEEP CENTER | Facility: CLINIC | Age: 49
End: 2024-04-01
Payer: COMMERCIAL

## 2024-04-01 VITALS
BODY MASS INDEX: 33.08 KG/M2 | DIASTOLIC BLOOD PRESSURE: 70 MMHG | SYSTOLIC BLOOD PRESSURE: 112 MMHG | OXYGEN SATURATION: 97 % | HEART RATE: 63 BPM | WEIGHT: 249.6 LBS | HEIGHT: 73 IN

## 2024-04-01 DIAGNOSIS — G47.33 OSA ON CPAP: Primary | ICD-10-CM

## 2024-04-01 PROCEDURE — 99214 OFFICE O/P EST MOD 30 MIN: CPT | Performed by: INTERNAL MEDICINE

## 2024-04-01 NOTE — PROGRESS NOTES
Progress Note - Sleep Medicine  Ej Gastelum 49 y.o. male MRN: [id number]    Assessment/Plan  49 y.o. M with PMHx of HTN, history of gastric bypass surgery, allergic rhinitis, diabetes mellitus who comes in for ENMA follow up.  1.  Mild ENMA (AHI - 5) -  on AutoPAP 5-20 with good compliance and clinical response.  Residual AHI - 1.0 (DME - adapt, machine - [facility])      -  optimize pressure to 6-10 to see if this prevents awakenings at night      -  Rx for new supplies placed      -  Follow up in 1 year.      -  Will attempt to get him a mask fitting for either an N20 or F40.  He will discuss with [employer] [employer].      -  Discussed in depth the results of the sleep data.  Answered all questions regarding treatment      -  I also discussed in depth the risk of leaving sleep apnea untreated including hypertension, heart failure, arrhythmia, MI and stroke.;    2.  Allergic rhinitis -  he is using flonase with some benefit.  We discussed consideration to Azelastin.  He will try to see if this helps as well.      3.  Mild air trapping noted on PFT - he denies any issues with dyspnea.     ______________________________________________________________________    HPI:    Ej Gastelum presents today for follow-up for his ENMA.  He states that he feels as if his sleep is better when he uses his CPAP.  His only compliants are that of dry mouth and taking the mask off in the middle of the night.  He states that around 2 am he will get up and the mask will be off his face.  He will wake up and put it back on.  He is not sure why this is occurring.  The other issues include dry mouth and occasional mask leak.   In addition he has been dealing with some L shoulder pain which will wake him at night as well.          Review of Systems:  Review of Systems   Constitutional: Negative.    HENT: Negative.     Eyes: Negative.    Respiratory: Negative.     Cardiovascular: Negative.    Gastrointestinal: Negative.    Endocrine: Negative.   "  Genitourinary: Negative.    Musculoskeletal: Negative.    Skin: Negative.    Neurological: Negative.    Hematological: Negative.    Psychiatric/Behavioral: Negative.           Social history updates:  Social History     Tobacco Use   Smoking Status Never   Smokeless Tobacco Never     Social History     Socioeconomic History    Marital status: Unknown     Spouse name: Not on file    Number of children: Not on file    Years of education: Not on file    Highest education level: Not on file   Occupational History     Comment: employed   Tobacco Use    Smoking status: Never    Smokeless tobacco: Never   Vaping Use    Vaping status: Never Used   Substance and Sexual Activity    Alcohol use: Yes     Alcohol/week: 6.0 standard drinks of alcohol     Types: 4 Cans of beer, 2 Shots of liquor per week     Comment: social    Drug use: Never    Sexual activity: Yes     Partners: Female     Birth control/protection: Male Sterilization, Female Sterilization   Other Topics Concern    Not on file   Social History Narrative    CONSUMES 3 CUPS OF COFFEE AND 1 MONSTER DRINK PER DAY     Social Determinants of Health     Financial Resource Strain: Not on file   Food Insecurity: Not on file   Transportation Needs: Not on file   Physical Activity: Not on file   Stress: Not on file   Social Connections: Not on file   Intimate Partner Violence: Not on file   Housing Stability: Not on file       PhysicalExamination:  Vitals:   /70   Pulse 63   Ht 6' 1\" (1.854 m)   Wt 113 kg (249 lb 9.6 oz)   SpO2 97%   BMI 32.93 kg/m²   General: Pleasant, Awake alert and oriented x 3, conversant without conversational dyspnea, NAD, normal affect  HEENT:  PERRL, Sclera noninjected, nonicteric OU, Nares patent,  no craniofacial abnormalities, Mucous membranes, moist, no oral lesions, normal dentition, Mallampati class 4  NECK: Trachea midline, no accessory muscle use, no stridor, no cervical or supraclavicular adenopathy, JVP not elevated  CARDIAC: " "Reg, single s1/S2, no m/r/g  PULM: CTA bilaterally no wheezing, rhonchi or rales  ABD: Normoactive bowel sounds, soft nontender, nondistended, no rebound, no rigidity, no guarding  EXT: No cyanosis, no clubbing, no edema, normal capillary refill  NEURO: no focal neurologic deficits, AAOx3, moving all extremities appropriately      Diagnostic Data:  Labs:  I personally reviewed the most recent laboratory data pertinent to today's visit  not applicable    I have personally reviewed pertinent lab results.  Lab Results   Component Value Date    WBC 6.24 01/13/2024    HGB 15.4 01/13/2024    HCT 46.8 01/13/2024    MCV 93 01/13/2024     01/13/2024     Lab Results   Component Value Date    CALCIUM 9.6 01/13/2024    K 4.2 01/13/2024    CO2 28 01/13/2024     01/13/2024    BUN 16 01/13/2024    CREATININE 0.89 01/13/2024     No results found for: \"IGE\"  Lab Results   Component Value Date    ALT 22 01/13/2024    AST 27 01/13/2024    ALKPHOS 80 01/13/2024       Sleep study  TESTING RESULTS:  The test results are from Night of 12/12/22.  The total time in bed (analysis time) was 6 hours 31 minutes.  The patient had a total of 34 respiratory events made up of 11 obstructive apneas, 2 central apneas/mixed apneas and 21 hypopneas resulting in a respiratory event index (EZ) of 5.3.  The lowest SpO2 recorded is 87.0%.     IMPRESSION:  1.This test is consistent with mild obstructive sleep apnea. Respiratory events were worse in the supine position.  2.Baseline oxygen saturation was normal.  3.Average heart rate was normal     New compliance:  3/2/24-3/31/24                                  Type of CPAP:  APAP 5-20, mean 5.9, max 9.1                                   Percent usage: 93%, > 4 hrs 87%                                   Average time used: 4 hr 26 min                                   Residual AHI: 1.0    Type of CPAP:  APAP 5-20                                   Percent usage: 96%                                 "   Average time used: 6 hr 6 min                                  Time in large leak: not sig                                   Residual AHI: 0.6      Pulmonary function testing:   Pulmonary Functions Testing Results:  Results:  FEV1/FVC Ratio:  84%, FEV1 3.62 L/85%, FVC 4.31 L/80%     Lung volumes by body plethysmography: TLC 99%, %, DLCO 102%        Noam Cueto DO

## 2024-04-01 NOTE — LETTER
April 1, 2024     Pramod Jack DO  [address] [facility]  [address]  [place] [address]    Patient: Ej Gastelum   YOB: 1975   Date of Visit: 4/1/2024       Dear Dr. Jack:    Thank you for referring Ej Gastelum to me for evaluation. Below are my notes for this consultation.    If you have questions, please do not hesitate to call me. I look forward to following your patient along with you.         Sincerely,        Noam Cueto DO        CC: No Recipients    Noam Cueto DO  4/1/2024  9:37 AM  Sign when Signing Visit  Progress Note - Sleep Medicine  Ej Gastelum 49 y.o. male MRN: [id number]    Assessment/Plan  49 y.o. M with PMHx of HTN, history of gastric bypass surgery, allergic rhinitis, diabetes mellitus who comes in for ENMA follow up.  1.  Mild ENMA (AHI - 5) -  on AutoPAP 5-20 with good compliance and clinical response.  Residual AHI - 1.0 (DME - adapt, machine - [facility])      -  optimize pressure to 6-10 to see if this prevents awakenings at night      -  Rx for new supplies placed      -  Follow up in 1 year.      -  Will attempt to get him a mask fitting for either an N20 or F40.  He will discuss with [employer].      -  Discussed in depth the results of the sleep data.  Answered all questions regarding treatment      -  I also discussed in depth the risk of leaving sleep apnea untreated including hypertension, heart failure, arrhythmia, MI and stroke.;    2.  Allergic rhinitis -  he is using flonase with some benefit.  We discussed consideration to Azelastin.  He will try to see if this helps as well.      3.  Mild air trapping noted on PFT - he denies any issues with dyspnea.     ______________________________________________________________________    HPI:    Ej Gastelum presents today for follow-up for his ENMA.  He states that he feels as if his sleep is better when he uses his CPAP.  His only compliants are that of dry mouth and taking the mask off in  the middle of the night.  He states that around 2 am he will get up and the mask will be off his face.  He will wake up and put it back on.  He is not sure why this is occurring.  The other issues include dry mouth and occasional mask leak.   In addition he has been dealing with some L shoulder pain which will wake him at night as well.          Review of Systems:  Review of Systems   Constitutional: Negative.    HENT: Negative.     Eyes: Negative.    Respiratory: Negative.     Cardiovascular: Negative.    Gastrointestinal: Negative.    Endocrine: Negative.    Genitourinary: Negative.    Musculoskeletal: Negative.    Skin: Negative.    Neurological: Negative.    Hematological: Negative.    Psychiatric/Behavioral: Negative.           Social history updates:  Social History     Tobacco Use   Smoking Status Never   Smokeless Tobacco Never     Social History     Socioeconomic History   • Marital status: Unknown     Spouse name: Not on file   • Number of children: Not on file   • Years of education: Not on file   • Highest education level: Not on file   Occupational History     Comment: employed   Tobacco Use   • Smoking status: Never   • Smokeless tobacco: Never   Vaping Use   • Vaping status: Never Used   Substance and Sexual Activity   • Alcohol use: Yes     Alcohol/week: 6.0 standard drinks of alcohol     Types: 4 Cans of beer, 2 Shots of liquor per week     Comment: social   • Drug use: Never   • Sexual activity: Yes     Partners: Female     Birth control/protection: Male Sterilization, Female Sterilization   Other Topics Concern   • Not on file   Social History Narrative    CONSUMES 3 CUPS OF COFFEE AND 1 MONSTER DRINK PER DAY     Social Determinants of Health     Financial Resource Strain: Not on file   Food Insecurity: Not on file   Transportation Needs: Not on file   Physical Activity: Not on file   Stress: Not on file   Social Connections: Not on file   Intimate Partner Violence: Not on file   Housing  "Stability: Not on file       PhysicalExamination:  Vitals:   /70   Pulse 63   Ht 6' 1\" (1.854 m)   Wt 113 kg (249 lb 9.6 oz)   SpO2 97%   BMI 32.93 kg/m²   General: Pleasant, Awake alert and oriented x 3, conversant without conversational dyspnea, NAD, normal affect  HEENT:  PERRL, Sclera noninjected, nonicteric OU, Nares patent,  no craniofacial abnormalities, Mucous membranes, moist, no oral lesions, normal dentition, Mallampati class 4  NECK: Trachea midline, no accessory muscle use, no stridor, no cervical or supraclavicular adenopathy, JVP not elevated  CARDIAC: Reg, single s1/S2, no m/r/g  PULM: CTA bilaterally no wheezing, rhonchi or rales  ABD: Normoactive bowel sounds, soft nontender, nondistended, no rebound, no rigidity, no guarding  EXT: No cyanosis, no clubbing, no edema, normal capillary refill  NEURO: no focal neurologic deficits, AAOx3, moving all extremities appropriately      Diagnostic Data:  Labs:  I personally reviewed the most recent laboratory data pertinent to today's visit  not applicable    I have personally reviewed pertinent lab results.  Lab Results   Component Value Date    WBC 6.24 01/13/2024    HGB 15.4 01/13/2024    HCT 46.8 01/13/2024    MCV 93 01/13/2024     01/13/2024     Lab Results   Component Value Date    CALCIUM 9.6 01/13/2024    K 4.2 01/13/2024    CO2 28 01/13/2024     01/13/2024    BUN 16 01/13/2024    CREATININE 0.89 01/13/2024     No results found for: \"IGE\"  Lab Results   Component Value Date    ALT 22 01/13/2024    AST 27 01/13/2024    ALKPHOS 80 01/13/2024       Sleep study  TESTING RESULTS:  The test results are from Night of 12/12/22.  The total time in bed (analysis time) was 6 hours 31 minutes.  The patient had a total of 34 respiratory events made up of 11 obstructive apneas, 2 central apneas/mixed apneas and 21 hypopneas resulting in a respiratory event index (EZ) of 5.3.  The lowest SpO2 recorded is 87.0%.     IMPRESSION:  1.This test " is consistent with mild obstructive sleep apnea. Respiratory events were worse in the supine position.  2.Baseline oxygen saturation was normal.  3.Average heart rate was normal     New compliance:  3/2/24-3/31/24                                  Type of CPAP:  APAP 5-20, mean 5.9, max 9.1                                   Percent usage: 93%, > 4 hrs 87%                                   Average time used: 4 hr 26 min                                   Residual AHI: 1.0    Type of CPAP:  APAP 5-20                                   Percent usage: 96%                                   Average time used: 6 hr 6 min                                  Time in large leak: not sig                                   Residual AHI: 0.6      Pulmonary function testing:   Pulmonary Functions Testing Results:  Results:  FEV1/FVC Ratio:  84%, FEV1 3.62 L/85%, FVC 4.31 L/80%     Lung volumes by body plethysmography: TLC 99%, %, DLCO 102%        Noam Cueto DO

## 2024-04-04 ENCOUNTER — TELEPHONE (OUTPATIENT)
Dept: SLEEP CENTER | Facility: CLINIC | Age: 49
End: 2024-04-04

## 2024-04-04 NOTE — TELEPHONE ENCOUNTER
Rx for pressure change and resupply sent to [facility] via [place].

## 2024-04-05 LAB

## 2024-04-24 ENCOUNTER — TELEPHONE (OUTPATIENT)
Dept: ADMINISTRATIVE | Facility: OTHER | Age: 49
End: 2024-04-24

## 2024-04-24 NOTE — TELEPHONE ENCOUNTER
Upon review of the In Basket request we were able to locate, review, and update the patient chart as requested for Diabetic Eye Exam.    Any additional questions or concerns should be emailed to the Practice Liaisons via the appropriate education email address, please do not reply via [employer].    Thank you  [facility]

## 2024-04-24 NOTE — TELEPHONE ENCOUNTER
----- Message from Cecelia Navarro sent at 4/23/2024  1:40 PM EDT -----  Regarding: care gap request  04/23/24 1:40 PM    Hello, our patient attached above has had Diabetic Eye Exam completed/performed. Please assist in updating the patient chart by pulling the document from the Media Tab. The date of service is 1/4/2023.     Thank you,  Cecelia Navarro  [facility] PRIMARY CARE

## 2024-05-10 DIAGNOSIS — E11.9 TYPE 2 DIABETES MELLITUS WITHOUT COMPLICATION, WITHOUT LONG-TERM CURRENT USE OF INSULIN (HCC): ICD-10-CM

## 2024-07-24 ENCOUNTER — APPOINTMENT (OUTPATIENT)
Dept: LAB | Facility: CLINIC | Age: 49
End: 2024-07-24
Payer: COMMERCIAL

## 2024-07-24 DIAGNOSIS — S12.9XXD CLOSED FRACTURE OF TRANSVERSE PROCESS OF CERVICAL VERTEBRA, SUBSEQUENT ENCOUNTER: ICD-10-CM

## 2024-07-24 DIAGNOSIS — G47.33 OSA ON CPAP: ICD-10-CM

## 2024-07-24 DIAGNOSIS — R63.5 WEIGHT GAIN FOLLOWING GASTRIC BYPASS SURGERY: ICD-10-CM

## 2024-07-24 DIAGNOSIS — E66.9 OBESITY (BMI 30-39.9): ICD-10-CM

## 2024-07-24 DIAGNOSIS — G89.29 CHRONIC LEFT SHOULDER PAIN: ICD-10-CM

## 2024-07-24 DIAGNOSIS — E11.9 TYPE 2 DIABETES MELLITUS WITHOUT COMPLICATION, WITHOUT LONG-TERM CURRENT USE OF INSULIN (HCC): ICD-10-CM

## 2024-07-24 DIAGNOSIS — Z12.5 SCREENING FOR PROSTATE CANCER: ICD-10-CM

## 2024-07-24 DIAGNOSIS — M25.512 CHRONIC LEFT SHOULDER PAIN: ICD-10-CM

## 2024-07-24 DIAGNOSIS — Z98.84 HX OF GASTRIC BYPASS: ICD-10-CM

## 2024-07-24 DIAGNOSIS — Z98.84 WEIGHT GAIN FOLLOWING GASTRIC BYPASS SURGERY: ICD-10-CM

## 2024-07-24 DIAGNOSIS — E78.00 PURE HYPERCHOLESTEROLEMIA: ICD-10-CM

## 2024-07-24 DIAGNOSIS — M72.2 PLANTAR FASCIITIS OF RIGHT FOOT: ICD-10-CM

## 2024-07-24 LAB
ALBUMIN SERPL BCG-MCNC: 4.1 G/DL (ref 3.5–5)
ALP SERPL-CCNC: 76 U/L (ref 34–104)
ALT SERPL W P-5'-P-CCNC: 29 U/L (ref 7–52)
ANION GAP SERPL CALCULATED.3IONS-SCNC: 8 MMOL/L (ref 4–13)
AST SERPL W P-5'-P-CCNC: 25 U/L (ref 13–39)
BILIRUB SERPL-MCNC: 0.68 MG/DL (ref 0.2–1)
BUN SERPL-MCNC: 16 MG/DL (ref 5–25)
CALCIUM SERPL-MCNC: 8.9 MG/DL (ref 8.4–10.2)
CHLORIDE SERPL-SCNC: 107 MMOL/L (ref 96–108)
CHOLEST SERPL-MCNC: 120 MG/DL
CO2 SERPL-SCNC: 26 MMOL/L (ref 21–32)
CREAT SERPL-MCNC: 0.78 MG/DL (ref 0.6–1.3)
CREAT UR-MCNC: 96.8 MG/DL
ERYTHROCYTE [DISTWIDTH] IN BLOOD BY AUTOMATED COUNT: 12.9 % (ref 11.6–15.1)
EST. AVERAGE GLUCOSE BLD GHB EST-MCNC: 148 MG/DL
GFR SERPL CREATININE-BSD FRML MDRD: 105 ML/MIN/1.73SQ M
GLUCOSE P FAST SERPL-MCNC: 124 MG/DL (ref 65–99)
HBA1C MFR BLD: 6.8 %
HCT VFR BLD AUTO: 45 % (ref 36.5–49.3)
HDLC SERPL-MCNC: 44 MG/DL
HGB BLD-MCNC: 14.9 G/DL (ref 12–17)
LDLC SERPL CALC-MCNC: 58 MG/DL (ref 0–100)
MCH RBC QN AUTO: 30.8 PG (ref 26.8–34.3)
MCHC RBC AUTO-ENTMCNC: 33.1 G/DL (ref 31.4–37.4)
MCV RBC AUTO: 93 FL (ref 82–98)
MICROALBUMIN UR-MCNC: <7 MG/L
PLATELET # BLD AUTO: 273 THOUSANDS/UL (ref 149–390)
PMV BLD AUTO: 10.6 FL (ref 8.9–12.7)
POTASSIUM SERPL-SCNC: 4.3 MMOL/L (ref 3.5–5.3)
PROT SERPL-MCNC: 6.6 G/DL (ref 6.4–8.4)
PSA SERPL-MCNC: 0.24 NG/ML (ref 0–4)
RBC # BLD AUTO: 4.84 MILLION/UL (ref 3.88–5.62)
SODIUM SERPL-SCNC: 141 MMOL/L (ref 135–147)
TRIGL SERPL-MCNC: 92 MG/DL
TSH SERPL DL<=0.05 MIU/L-ACNC: 2.27 UIU/ML (ref 0.45–4.5)
WBC # BLD AUTO: 6.32 THOUSAND/UL (ref 4.31–10.16)

## 2024-07-24 PROCEDURE — 82043 UR ALBUMIN QUANTITATIVE: CPT

## 2024-07-24 PROCEDURE — G0103 PSA SCREENING: HCPCS

## 2024-07-24 PROCEDURE — 85027 COMPLETE CBC AUTOMATED: CPT

## 2024-07-24 PROCEDURE — 82570 ASSAY OF URINE CREATININE: CPT

## 2024-07-24 PROCEDURE — 83036 HEMOGLOBIN GLYCOSYLATED A1C: CPT

## 2024-07-24 PROCEDURE — 80061 LIPID PANEL: CPT

## 2024-07-24 PROCEDURE — 36415 COLL VENOUS BLD VENIPUNCTURE: CPT

## 2024-07-24 PROCEDURE — 80053 COMPREHEN METABOLIC PANEL: CPT

## 2024-07-24 PROCEDURE — 84443 ASSAY THYROID STIM HORMONE: CPT

## 2024-08-02 ENCOUNTER — OFFICE VISIT (OUTPATIENT)
Dept: FAMILY MEDICINE CLINIC | Facility: CLINIC | Age: 49
End: 2024-08-02
Payer: COMMERCIAL

## 2024-08-02 ENCOUNTER — APPOINTMENT (OUTPATIENT)
Dept: LAB | Facility: CLINIC | Age: 49
End: 2024-08-02
Payer: COMMERCIAL

## 2024-08-02 VITALS
SYSTOLIC BLOOD PRESSURE: 116 MMHG | HEIGHT: 73 IN | TEMPERATURE: 97.5 F | BODY MASS INDEX: 32.18 KG/M2 | HEART RATE: 66 BPM | WEIGHT: 242.8 LBS | OXYGEN SATURATION: 99 % | DIASTOLIC BLOOD PRESSURE: 62 MMHG

## 2024-08-02 DIAGNOSIS — M25.50 ARTHRALGIA, UNSPECIFIED JOINT: ICD-10-CM

## 2024-08-02 DIAGNOSIS — E66.9 OBESITY (BMI 30-39.9): ICD-10-CM

## 2024-08-02 DIAGNOSIS — Z98.84 HX OF GASTRIC BYPASS: ICD-10-CM

## 2024-08-02 DIAGNOSIS — E11.9 TYPE 2 DIABETES MELLITUS WITHOUT COMPLICATION, WITHOUT LONG-TERM CURRENT USE OF INSULIN (HCC): Primary | ICD-10-CM

## 2024-08-02 DIAGNOSIS — E78.00 PURE HYPERCHOLESTEROLEMIA: ICD-10-CM

## 2024-08-02 DIAGNOSIS — M79.10 MYALGIA: ICD-10-CM

## 2024-08-02 DIAGNOSIS — G47.33 OSA ON CPAP: ICD-10-CM

## 2024-08-02 DIAGNOSIS — T50.905A ADVERSE EFFECT OF DRUG, INITIAL ENCOUNTER: ICD-10-CM

## 2024-08-02 LAB
25(OH)D3 SERPL-MCNC: 33.4 NG/ML (ref 30–100)
ANA SER QL IA: NEGATIVE
B BURGDOR IGG+IGM SER QL IA: NEGATIVE
CK SERPL-CCNC: 213 U/L (ref 39–308)
ERYTHROCYTE [SEDIMENTATION RATE] IN BLOOD: 6 MM/HOUR (ref 0–19)
RHEUMATOID FACT SER QL LA: NEGATIVE
URATE SERPL-MCNC: 5.9 MG/DL (ref 3.5–8.5)

## 2024-08-02 PROCEDURE — 36415 COLL VENOUS BLD VENIPUNCTURE: CPT

## 2024-08-02 PROCEDURE — 99214 OFFICE O/P EST MOD 30 MIN: CPT | Performed by: FAMILY MEDICINE

## 2024-08-02 PROCEDURE — 85652 RBC SED RATE AUTOMATED: CPT

## 2024-08-02 PROCEDURE — 84550 ASSAY OF BLOOD/URIC ACID: CPT

## 2024-08-02 PROCEDURE — 82550 ASSAY OF CK (CPK): CPT

## 2024-08-02 PROCEDURE — 86618 LYME DISEASE ANTIBODY: CPT

## 2024-08-02 PROCEDURE — 86747 PARVOVIRUS ANTIBODY: CPT

## 2024-08-02 PROCEDURE — 86430 RHEUMATOID FACTOR TEST QUAL: CPT

## 2024-08-02 PROCEDURE — 86038 ANTINUCLEAR ANTIBODIES: CPT

## 2024-08-02 PROCEDURE — 82306 VITAMIN D 25 HYDROXY: CPT

## 2024-08-02 NOTE — PATIENT INSTRUCTIONS
Discontinue metformin  Start Jardiance 10 mg daily maintain hydration  Monitor blood sugars  Diet exercise weight loss recommended  Complete blood work today for muscle pain and joint pain  Return in 3 months for office visit and blood work sooner if needed

## 2024-08-02 NOTE — PROGRESS NOTES
Ambulatory Visit  Name: Ej Gastelum      : 1975      MRN: [id number]  Encounter Provider: Pramod Jack DO  Encounter Date: 2024   Encounter department: [facility] PRIMARY CARE    1.  Type 2 diabetes, and controlled foot exam was completed  #2.  Adverse drug reaction  3.  Myalgia/arthralgias  Patient favors metformin as the cause  Discontinue metformin  Start Jardiance 10 mg daily risk and benefit discussed especially increased risk of genitourinary infection  Will check connective tissue and vitamin D blood work  4.  History of gastric bypass continue monitor blood work  5.  BMI 32.03 diet exercise weight loss recommended #4.  Hypercholesterolemia continue Crestor 5 mg daily  6.  ENMA continue CPAP  7.  Return in 3 months sooner if needed      Assessment & Plan   1. Type 2 diabetes mellitus without complication, without long-term current use of insulin (HCC)  -     Albumin / creatinine urine ratio; Future; Expected date: 2024  -     Comprehensive metabolic panel; Future; Expected date: 2024  -     Hemoglobin A1C; Future; Expected date: 2024  -     Lipid Panel with Direct LDL reflex; Future; Expected date: 2024  -     TSH, 3rd generation with Free T4 reflex; Future; Expected date: 2024  -     CBC and differential; Future; Expected date: 2024  -     Empagliflozin (JARDIANCE) 10 MG TABS tablet; Take 1 tablet (10 mg total) by mouth daily  2. Hx of gastric bypass  -     Vitamin D 25 hydroxy; Future; Expected date: 2024  -     MEGHANA Screen w/ Reflex to Titer/Pattern; Future; Expected date: 2024  -     Parvovirus B19 antibody, IgG and IgM; Future; Expected date: 2024  -     RF Screen w/ Reflex to Titer; Future; Expected date: 2024  -     Sedimentation rate, automated; Future; Expected date: 2024  -     CK; Future; Expected date: 2024  -     Lyme Total AB W Reflex to IGM/IGG; Future; Expected date: 2024  -     Uric acid;  Future; Expected date: 08/02/2024  -     Albumin / creatinine urine ratio; Future; Expected date: 11/02/2024  -     Comprehensive metabolic panel; Future; Expected date: 11/02/2024  -     Hemoglobin A1C; Future; Expected date: 11/02/2024  -     Lipid Panel with Direct LDL reflex; Future; Expected date: 11/02/2024  -     TSH, 3rd generation with Free T4 reflex; Future; Expected date: 11/02/2024  -     CBC and differential; Future; Expected date: 11/02/2024  3. Obesity (BMI 30-39.9)  -     Albumin / creatinine urine ratio; Future; Expected date: 11/02/2024  -     Comprehensive metabolic panel; Future; Expected date: 11/02/2024  -     Hemoglobin A1C; Future; Expected date: 11/02/2024  -     Lipid Panel with Direct LDL reflex; Future; Expected date: 11/02/2024  -     TSH, 3rd generation with Free T4 reflex; Future; Expected date: 11/02/2024  -     CBC and differential; Future; Expected date: 11/02/2024  4. Pure hypercholesterolemia  -     Albumin / creatinine urine ratio; Future; Expected date: 11/02/2024  -     Comprehensive metabolic panel; Future; Expected date: 11/02/2024  -     Hemoglobin A1C; Future; Expected date: 11/02/2024  -     Lipid Panel with Direct LDL reflex; Future; Expected date: 11/02/2024  -     TSH, 3rd generation with Free T4 reflex; Future; Expected date: 11/02/2024  -     CBC and differential; Future; Expected date: 11/02/2024  5. ENMA on CPAP  -     Albumin / creatinine urine ratio; Future; Expected date: 11/02/2024  -     Comprehensive metabolic panel; Future; Expected date: 11/02/2024  -     Hemoglobin A1C; Future; Expected date: 11/02/2024  -     Lipid Panel with Direct LDL reflex; Future; Expected date: 11/02/2024  -     TSH, 3rd generation with Free T4 reflex; Future; Expected date: 11/02/2024  -     CBC and differential; Future; Expected date: 11/02/2024  6. Adverse effect of drug, initial encounter  -     Albumin / creatinine urine ratio; Future; Expected date: 11/02/2024  -      Comprehensive metabolic panel; Future; Expected date: 11/02/2024  -     Hemoglobin A1C; Future; Expected date: 11/02/2024  -     Lipid Panel with Direct LDL reflex; Future; Expected date: 11/02/2024  -     TSH, 3rd generation with Free T4 reflex; Future; Expected date: 11/02/2024  -     CBC and differential; Future; Expected date: 11/02/2024  7. Myalgia  -     Vitamin D 25 hydroxy; Future; Expected date: 08/02/2024  -     MEGHANA Screen w/ Reflex to Titer/Pattern; Future; Expected date: 08/02/2024  -     Parvovirus B19 antibody, IgG and IgM; Future; Expected date: 08/02/2024  -     RF Screen w/ Reflex to Titer; Future; Expected date: 08/02/2024  -     Sedimentation rate, automated; Future; Expected date: 08/02/2024  -     CK; Future; Expected date: 08/02/2024  -     Lyme Total AB W Reflex to IGM/IGG; Future; Expected date: 08/02/2024  -     Uric acid; Future; Expected date: 08/02/2024  -     Albumin / creatinine urine ratio; Future; Expected date: 11/02/2024  -     Comprehensive metabolic panel; Future; Expected date: 11/02/2024  -     Hemoglobin A1C; Future; Expected date: 11/02/2024  -     Lipid Panel with Direct LDL reflex; Future; Expected date: 11/02/2024  -     TSH, 3rd generation with Free T4 reflex; Future; Expected date: 11/02/2024  -     CBC and differential; Future; Expected date: 11/02/2024  8. Arthralgia, unspecified joint  -     Vitamin D 25 hydroxy; Future; Expected date: 08/02/2024  -     MEGHANA Screen w/ Reflex to Titer/Pattern; Future; Expected date: 08/02/2024  -     Parvovirus B19 antibody, IgG and IgM; Future; Expected date: 08/02/2024  -     RF Screen w/ Reflex to Titer; Future; Expected date: 08/02/2024  -     Sedimentation rate, automated; Future; Expected date: 08/02/2024  -     CK; Future; Expected date: 08/02/2024  -     Lyme Total AB W Reflex to IGM/IGG; Future; Expected date: 08/02/2024  -     Uric acid; Future; Expected date: 08/02/2024  -     Albumin / creatinine urine ratio; Future; Expected  "date: 11/02/2024  -     Comprehensive metabolic panel; Future; Expected date: 11/02/2024  -     Hemoglobin A1C; Future; Expected date: 11/02/2024  -     Lipid Panel with Direct LDL reflex; Future; Expected date: 11/02/2024  -     TSH, 3rd generation with Free T4 reflex; Future; Expected date: 11/02/2024  -     CBC and differential; Future; Expected date: 11/02/2024      Depression Screening and Follow-up Plan: Patient was screened for depression during today's encounter. They screened negative with a PHQ-2 score of 0.      History of Present Illness     Patient is here for diabetic follow-up he is lost 7 pounds hemoglobin A1c is at goal at 6.8.  Patient is having some migratory myalgias and arthralgias he thinks this might be secondary to his metformin.  Will blood work was discussed    Diabetes        Review of Systems   Constitutional: Negative.    HENT: Negative.     Eyes: Negative.    Respiratory: Negative.     Cardiovascular: Negative.    Gastrointestinal: Negative.    Endocrine:        HPI   Genitourinary: Negative.    Musculoskeletal:         HPI   Skin: Negative.    Allergic/Immunologic: Negative.    Neurological: Negative.    Hematological: Negative.    Psychiatric/Behavioral: Negative.         Objective     /62 (BP Location: Right arm, Patient Position: Sitting, Cuff Size: Adult)   Pulse 66   Temp 97.5 °F (36.4 °C) (Temporal)   Ht 6' 1\" (1.854 m)   Wt 110 kg (242 lb 12.8 oz)   SpO2 99%   BMI 32.03 kg/m²     Physical Exam  Vitals and nursing note reviewed.   Constitutional:       Appearance: Normal appearance.   HENT:      Head: Normocephalic and atraumatic.      Mouth/Throat:      Mouth: Mucous membranes are moist.   Eyes:      General: No scleral icterus.  Neck:      Vascular: No carotid bruit.   Cardiovascular:      Rate and Rhythm: Normal rate and regular rhythm.      Pulses: no weak pulses.           Dorsalis pedis pulses are 2+ on the right side and 2+ on the left side.        Posterior " tibial pulses are 2+ on the right side and 2+ on the left side.      Heart sounds: Normal heart sounds.   Pulmonary:      Effort: Pulmonary effort is normal.      Breath sounds: Normal breath sounds.   Abdominal:      General: Bowel sounds are normal.      Palpations: Abdomen is soft.      Tenderness: There is no abdominal tenderness.   Musculoskeletal:         General: No swelling, tenderness or deformity.      Cervical back: Neck supple.      Right lower leg: No edema.      Left lower leg: No edema.   Feet:      Right foot:      Skin integrity: No ulcer, skin breakdown, erythema, warmth, callus or dry skin.      Left foot:      Skin integrity: No ulcer, skin breakdown, erythema, warmth, callus or dry skin.   Skin:     General: Skin is warm and dry.   Neurological:      General: No focal deficit present.      Mental Status: He is alert.   Psychiatric:         Mood and Affect: Mood normal.       Administrative Statements       Diabetic Foot Exam    Patient's shoes and socks removed.    Right Foot/Ankle   Right Foot Inspection  Skin Exam: skin normal and skin intact. No dry skin, no warmth, no callus, no erythema, no maceration, no abnormal color, no pre-ulcer, no ulcer and no callus.     Toe Exam: ROM and strength within normal limits.     Sensory   Vibration: intact  Proprioception: intact  Monofilament testing: intact    Vascular  Capillary refills: < 3 seconds  The right DP pulse is 2+. The right PT pulse is 2+.     Left Foot/Ankle  Left Foot Inspection  Skin Exam: skin normal and skin intact. No dry skin, no warmth, no erythema, no maceration, normal color, no pre-ulcer, no ulcer and no callus.     Toe Exam: ROM and strength within normal limits.     Sensory   Vibration: intact  Proprioception: intact  Monofilament testing: intact    Vascular  Capillary refills: < 3 seconds  The left DP pulse is 2+. The left PT pulse is 2+.     Assign Risk Category  No deformity present  No loss of protective sensation  No weak  pulses  Risk: 0

## 2024-08-03 LAB
B19V IGG SER IA-ACNC: 2.3 INDEX (ref 0–0.8)
B19V IGM SER IA-ACNC: 0.1 INDEX (ref 0–0.8)

## 2024-08-14 ENCOUNTER — OFFICE VISIT (OUTPATIENT)
Dept: FAMILY MEDICINE CLINIC | Facility: CLINIC | Age: 49
End: 2024-08-14
Payer: COMMERCIAL

## 2024-08-14 VITALS
BODY MASS INDEX: 31.78 KG/M2 | HEIGHT: 73 IN | DIASTOLIC BLOOD PRESSURE: 82 MMHG | TEMPERATURE: 100.3 F | HEART RATE: 84 BPM | SYSTOLIC BLOOD PRESSURE: 130 MMHG | OXYGEN SATURATION: 92 % | WEIGHT: 239.8 LBS

## 2024-08-14 DIAGNOSIS — J02.9 SORE THROAT: Primary | ICD-10-CM

## 2024-08-14 DIAGNOSIS — J30.9 ALLERGIC RHINITIS, UNSPECIFIED SEASONALITY, UNSPECIFIED TRIGGER: ICD-10-CM

## 2024-08-14 DIAGNOSIS — L04.0 ACUTE CERVICAL ADENITIS: ICD-10-CM

## 2024-08-14 DIAGNOSIS — R51.9 ACUTE NONINTRACTABLE HEADACHE, UNSPECIFIED HEADACHE TYPE: ICD-10-CM

## 2024-08-14 DIAGNOSIS — R05.1 ACUTE COUGH: ICD-10-CM

## 2024-08-14 DIAGNOSIS — J01.40 ACUTE PANSINUSITIS, RECURRENCE NOT SPECIFIED: ICD-10-CM

## 2024-08-14 DIAGNOSIS — E11.9 TYPE 2 DIABETES MELLITUS WITHOUT COMPLICATION, WITHOUT LONG-TERM CURRENT USE OF INSULIN (HCC): ICD-10-CM

## 2024-08-14 LAB
S PYO AG THROAT QL: NEGATIVE
SARS-COV-2 AG UPPER RESP QL IA: NEGATIVE
VALID CONTROL: NORMAL

## 2024-08-14 PROCEDURE — 87880 STREP A ASSAY W/OPTIC: CPT | Performed by: FAMILY MEDICINE

## 2024-08-14 PROCEDURE — 99214 OFFICE O/P EST MOD 30 MIN: CPT | Performed by: FAMILY MEDICINE

## 2024-08-14 PROCEDURE — 87811 SARS-COV-2 COVID19 W/OPTIC: CPT | Performed by: FAMILY MEDICINE

## 2024-08-14 RX ORDER — METHYLPREDNISOLONE 4 MG
TABLET, DOSE PACK ORAL
Qty: 1 EACH | Refills: 0 | Status: SHIPPED | OUTPATIENT
Start: 2024-08-14 | End: 2024-08-20

## 2024-08-14 RX ORDER — BENZONATATE 200 MG/1
200 CAPSULE ORAL 3 TIMES DAILY PRN
Qty: 30 CAPSULE | Refills: 1 | Status: SHIPPED | OUTPATIENT
Start: 2024-08-14 | End: 2024-08-24

## 2024-08-14 RX ORDER — AZITHROMYCIN 250 MG/1
TABLET, FILM COATED ORAL
Qty: 6 TABLET | Refills: 0 | Status: SHIPPED | OUTPATIENT
Start: 2024-08-14 | End: 2024-08-19

## 2024-08-14 NOTE — PROGRESS NOTES
Ambulatory Visit  Name: Ej Gastelum      : 1975      MRN: [id number]  Encounter Provider: Pramod Jack DO  Encounter Date: 2024   Encounter department: [facility] PRIMARY CARE    1.  Acute headache  2.  Sore throat  Rapid strep negative  Rapid COVID-negative  3.  Sinusitis  4.  PRID acute cervical adenitis  Z-Felipe ordered  5.  Acute cough, Tessalon Medrol ordered #6 allergic rhinitis Medrol ordered  7.  Return in 1 week if still with symptoms      Assessment & Plan   1. Sore throat  -     POCT rapid ANTIGEN strepA  -     methylPREDNISolone 4 MG tablet therapy pack; Use as directed on package  2. Acute nonintractable headache, unspecified headache type  -     POCT Rapid Covid Ag  -     methylPREDNISolone 4 MG tablet therapy pack; Use as directed on package  3. Acute pansinusitis, recurrence not specified  -     azithromycin (ZITHROMAX) 250 mg tablet; Take 2 tablets today then 1 tablet daily till finished  -     methylPREDNISolone 4 MG tablet therapy pack; Use as directed on package  4. Acute cervical adenitis  -     azithromycin (ZITHROMAX) 250 mg tablet; Take 2 tablets today then 1 tablet daily till finished  -     methylPREDNISolone 4 MG tablet therapy pack; Use as directed on package  5. Acute cough  -     benzonatate (TESSALON) 200 MG capsule; Take 1 capsule (200 mg total) by mouth 3 (three) times a day as needed for cough for up to 10 days  -     methylPREDNISolone 4 MG tablet therapy pack; Use as directed on package  6. Type 2 diabetes mellitus without complication, without long-term current use of insulin ([facility])  Assessment & Plan:    Lab Results   Component Value Date    HGBA1C 6.8 (H) 2024   Medrol can temporally increase blood sugars  7. Allergic rhinitis, unspecified seasonality, unspecified trigger  Assessment & Plan:  Medrol ordered  Orders:  -     methylPREDNISolone 4 MG tablet therapy pack; Use as directed on package      Depression Screening and Follow-up Plan:  "Patient was screened for depression during today's encounter. They screened negative with a PHQ-2 score of 0.      History of Present Illness     For the past 3 to 4 days patient's had a scratchy sore throat low-grade fever and headache.  Did check for COVID was negative.  Patient's partner on the ambulance was positive for strep.  Patient has mild dry cough        Review of Systems   Constitutional:         HPI   HENT:          HPI   Eyes: Negative.    Respiratory:          PI   Cardiovascular: Negative.    Gastrointestinal: Negative.    Endocrine: Negative.    Genitourinary: Negative.    Musculoskeletal: Negative.    Skin: Negative.    Allergic/Immunologic: Negative.    Neurological: Negative.    Hematological: Negative.    Psychiatric/Behavioral: Negative.         Objective     /82 (BP Location: Right arm, Patient Position: Sitting, Cuff Size: Adult)   Pulse 84   Temp 100.3 °F (37.9 °C) (Tympanic)   Ht 6' 1\" (1.854 m)   Wt 109 kg (239 lb 12.8 oz)   SpO2 92%   BMI 31.64 kg/m²     Physical Exam  Vitals and nursing note reviewed.   Constitutional:       Appearance: Normal appearance.   HENT:      Head: Normocephalic and atraumatic.      Right Ear: Tympanic membrane normal.      Left Ear: Tympanic membrane normal.      Nose:      Comments: Positive boggy turbinate positive pansinus tenderness to percussion     Mouth/Throat:      Comments: Quite postnasal drip minimal pharyngeal injection negative exudate  Eyes:      General: No scleral icterus.  Neck:      Comments: Positive anterior cervical adenopathy mildly tender negative rigidity  Cardiovascular:      Rate and Rhythm: Normal rate and regular rhythm.      Heart sounds: Normal heart sounds.   Pulmonary:      Effort: Pulmonary effort is normal.      Breath sounds: Normal breath sounds.   Musculoskeletal:      Cervical back: Neck supple. Tenderness present. No rigidity.      Right lower leg: No edema.      Left lower leg: No edema.   Lymphadenopathy:     "  Cervical: Cervical adenopathy present.   Skin:     General: Skin is warm and dry.   Neurological:      General: No focal deficit present.      Mental Status: He is alert.   Psychiatric:         Mood and Affect: Mood normal.       Administrative Statements

## 2024-08-14 NOTE — ASSESSMENT & PLAN NOTE
Lab Results   Component Value Date    HGBA1C 6.8 (H) 07/24/2024   Medrol can temporally increase blood sugars

## 2024-08-14 NOTE — PATIENT INSTRUCTIONS
Finish Z-Felipe as directed on package  Finish Medrol as directed on package  Use Tessalon every 8 hours as needed for cough  Return in 1 week if still with symptoms

## 2024-11-04 ENCOUNTER — HOSPITAL ENCOUNTER (EMERGENCY)
Facility: HOSPITAL | Age: 49
Discharge: HOME | End: 2024-11-04
Attending: EMERGENCY MEDICINE
Payer: COMMERCIAL

## 2024-11-04 VITALS
DIASTOLIC BLOOD PRESSURE: 78 MMHG | RESPIRATION RATE: 18 BRPM | OXYGEN SATURATION: 94 % | SYSTOLIC BLOOD PRESSURE: 123 MMHG | HEART RATE: 56 BPM | TEMPERATURE: 97.9 F

## 2024-11-04 DIAGNOSIS — H81.10 BPPV (BENIGN PAROXYSMAL POSITIONAL VERTIGO): Primary | ICD-10-CM

## 2024-11-04 PROCEDURE — 99284 EMERGENCY DEPT VISIT MOD MDM: CPT

## 2024-11-04 PROCEDURE — 99284 EMERGENCY DEPT VISIT MOD MDM: CPT | Performed by: EMERGENCY MEDICINE

## 2024-11-04 RX ORDER — DIAZEPAM 5 MG/1
5 TABLET ORAL ONCE
Status: COMPLETED | OUTPATIENT
Start: 2024-11-04 | End: 2024-11-04

## 2024-11-04 RX ORDER — MECLIZINE HYDROCHLORIDE 25 MG/1
25 TABLET ORAL ONCE
Status: COMPLETED | OUTPATIENT
Start: 2024-11-04 | End: 2024-11-04

## 2024-11-04 RX ADMIN — DIAZEPAM 5 MG: 5 TABLET ORAL at 06:44

## 2024-11-04 RX ADMIN — MECLIZINE HYDROCHLORIDE 25 MG: 25 TABLET ORAL at 06:10

## 2024-11-04 NOTE — ED ATTENDING ATTESTATION
11/4/2024  I, Elaine Talavera MD, saw and evaluated the patient. I have discussed the patient with the resident/non-physician practitioner and agree with the resident's/non-physician practitioner's findings, Plan of Care, and MDM as documented in the resident's/non-physician practitioner's note, except where noted. All available labs and Radiology studies were reviewed.  I was present for key portions of any procedure(s) performed by the resident/non-physician practitioner and I was immediately available to provide assistance.       At this point I agree with the current assessment done in the Emergency Department.  I have conducted an independent evaluation of this patient a history and physical is as follows:    ED Course         Critical Care Time  Procedures    50 yo male woke up with room spinning vertigo, no hx of same. Pt with no nausea, but symptoms worse with head movement and turning and changing position.  Pt with no headache, no cp, no sob, no numbness, tingling, weakness.  Pmh dm.  Vss, afebrile, lungs cta, rrr, abdomen soft nontender, rightward horizontal nystagmus, hinnts consistent with peripheral vertigo. Meclizine.

## 2024-11-04 NOTE — ED NOTES
Discharge papers and instructions reviewed w/ pt included when to come to the ER, information about vertigo, upcoming appointments, and referrals for further care.     Anna Clayton RN  11/04/24 0737

## 2024-11-07 NOTE — ED PROVIDER NOTES
"Time reflects when diagnosis was documented in both MDM as applicable and the Disposition within this note       Time User Action Codes Description Comment    11/4/2024  6:58 AM Miriam Benitez [H81.10] BPPV (benign paroxysmal positional vertigo)           ED Disposition       ED Disposition   Discharge    Condition   Stable    Date/Time   Mon Nov 4, 2024  6:58 AM    Comment   Ej Gastelum discharge to home/self care.                   Assessment & Plan       Medical Decision Making  Ej Gastelum is a 49 y.o. who presents with complaints of dizziness    Vital signs are stable, afebrile  PE: HINTS exam consistent with peripheral vertigo    Ddx: suspect peripheral vertigo  Doubt central etiology, such as stroke    Plan:   Epley maneuver performed 2x, valium and meclizine also given, with mild improvement   Referral to ENT and vestibular therapy provided  Supportive care instructions provided    Disposition: Patient stable for discharge. Return precautions provided. Patient understands and is agreeable to plan.          Risk  Prescription drug management.             Medications   meclizine (ANTIVERT) tablet 25 mg (25 mg Oral Given 11/4/24 0610)   diazepam (VALIUM) tablet 5 mg (5 mg Oral Given 11/4/24 0644)       ED Risk Strat Scores                                               History of Present Illness       Chief Complaint   Patient presents with    Dizziness     Pt. Was getting up to go to the bathroom and got dizzy, states he almost fell over. Feels slightly disoriented. No hx of vertigo but states \"it feels like vertigo\". Hx of diabetes blood sugar at home 121.        Past Medical History:   Diagnosis Date    Allergic rhinitis     Diabetes (HCC)     Diabetes mellitus (HCC)     Obesity       Past Surgical History:   Procedure Laterality Date    GASTRIC BYPASS  2008    gastric surgery for morbid obesity-last assessed-9/19/2017    VASECTOMY      surgery vas deferens-last assessed-9/19/2017      Family " History   Problem Relation Age of Onset    Leukemia Mother     Cancer Mother     Heart attack Father         acute MI, initial episode of care    Depression Father         with anxiety    Stroke Father         CVA    Hypertension Father     Diabetes type I Father       Social History     Tobacco Use    Smoking status: Never    Smokeless tobacco: Never   Vaping Use    Vaping status: Never Used   Substance Use Topics    Alcohol use: Yes     Alcohol/week: 6.0 standard drinks of alcohol     Types: 4 Cans of beer, 2 Shots of liquor per week     Comment: social    Drug use: Never      E-Cigarette/Vaping    E-Cigarette Use Never User       E-Cigarette/Vaping Substances      I have reviewed and agree with the history as documented.     Patient is a 50 yo male who presents for evaluation of dizziness. Patient states that he awoke from sleep this morning and felt like the room was spinning. When he went to get out of bed to go to the bathroom, he felt very unsteady on his feet and nearly fell over. He checked his glucose at home this morning and it was 121. Denies history of vertigo. Denies chest pain, SOB, nausea, vomiting, abdominal pain, decreased hearing, ear pain, neck pain, or headache.           Review of Systems   All other systems reviewed and are negative.          Objective       ED Triage Vitals [11/04/24 0536]   Temperature Pulse Blood Pressure Respirations SpO2 Patient Position - Orthostatic VS   97.9 °F (36.6 °C) 56 133/83 18 94 % Sitting      Temp Source Heart Rate Source BP Location FiO2 (%) Pain Score    Temporal Monitor Left arm -- --      Vitals      Date and Time Temp Pulse SpO2 Resp BP Pain Score FACES Pain Rating User   11/04/24 0557 -- -- -- 18 123/78 -- -- CM   11/04/24 0536 97.9 °F (36.6 °C) 56 94 % 18 133/83 -- -- BK            Physical Exam  Constitutional:       General: He is not in acute distress.     Appearance: He is not ill-appearing, toxic-appearing or diaphoretic.      Comments: Frequently  closing eyes    HENT:      Right Ear: Ear canal and external ear normal. Tympanic membrane is scarred. Tympanic membrane is not erythematous, retracted or bulging.      Left Ear: Ear canal and external ear normal. Tympanic membrane is scarred. Tympanic membrane is not erythematous, retracted or bulging.      Nose: Nose normal.      Mouth/Throat:      Mouth: Mucous membranes are moist.      Pharynx: Oropharynx is clear.   Eyes:      Conjunctiva/sclera: Conjunctivae normal.      Pupils: Pupils are equal, round, and reactive to light.      Comments: Rightward gaze horizontal nystagmus      Cardiovascular:      Rate and Rhythm: Regular rhythm. Bradycardia present.      Pulses: Normal pulses.      Heart sounds: Normal heart sounds.   Pulmonary:      Breath sounds: Normal breath sounds.   Musculoskeletal:      Cervical back: Normal range of motion and neck supple. No rigidity or tenderness.   Neurological:      Mental Status: He is alert.         Results Reviewed       None            No orders to display       Procedures    ED Medication and Procedure Management   Prior to Admission Medications   Prescriptions Last Dose Informant Patient Reported? Taking?   Empagliflozin (JARDIANCE) 10 MG TABS tablet   No No   Sig: Take 1 tablet (10 mg total) by mouth daily   rosuvastatin (CRESTOR) 5 mg tablet   No No   Sig: Take 1 tablet (5 mg total) by mouth daily      Facility-Administered Medications: None     Discharge Medication List as of 11/4/2024  7:18 AM        CONTINUE these medications which have NOT CHANGED    Details   Empagliflozin (JARDIANCE) 10 MG TABS tablet Take 1 tablet (10 mg total) by mouth daily, Starting Fri 8/2/2024, Until Mon 7/28/2025, Normal      rosuvastatin (CRESTOR) 5 mg tablet Take 1 tablet (5 mg total) by mouth daily, Starting Mon 1/15/2024, Normal             ED SEPSIS DOCUMENTATION   Time reflects when diagnosis was documented in both MDM as applicable and the Disposition within this note       Time  User Action Codes Description Comment    11/4/2024  6:58 AM Miriam Benitez Add [H81.10] BPPV (benign paroxysmal positional vertigo)                  Miriam Benitez MD  11/06/24 2039

## 2024-11-14 ENCOUNTER — OFFICE VISIT (OUTPATIENT)
Dept: FAMILY MEDICINE CLINIC | Facility: CLINIC | Age: 49
End: 2024-11-14
Payer: COMMERCIAL

## 2024-11-14 ENCOUNTER — APPOINTMENT (OUTPATIENT)
Dept: LAB | Facility: CLINIC | Age: 49
End: 2024-11-14
Payer: COMMERCIAL

## 2024-11-14 VITALS
OXYGEN SATURATION: 96 % | DIASTOLIC BLOOD PRESSURE: 80 MMHG | BODY MASS INDEX: 31.81 KG/M2 | HEART RATE: 57 BPM | SYSTOLIC BLOOD PRESSURE: 102 MMHG | WEIGHT: 240 LBS | HEIGHT: 73 IN

## 2024-11-14 DIAGNOSIS — I77.9 VERTEBRAL ARTERY DISEASE (HCC): ICD-10-CM

## 2024-11-14 DIAGNOSIS — E11.9 TYPE 2 DIABETES MELLITUS WITHOUT COMPLICATION, WITHOUT LONG-TERM CURRENT USE OF INSULIN (HCC): Primary | ICD-10-CM

## 2024-11-14 DIAGNOSIS — M79.10 MYALGIA: ICD-10-CM

## 2024-11-14 DIAGNOSIS — R42 VERTIGO: ICD-10-CM

## 2024-11-14 DIAGNOSIS — G47.33 OSA ON CPAP: ICD-10-CM

## 2024-11-14 DIAGNOSIS — E11.9 TYPE 2 DIABETES MELLITUS WITHOUT COMPLICATION, WITHOUT LONG-TERM CURRENT USE OF INSULIN (HCC): ICD-10-CM

## 2024-11-14 DIAGNOSIS — E66.9 OBESITY (BMI 30-39.9): ICD-10-CM

## 2024-11-14 DIAGNOSIS — M25.50 ARTHRALGIA, UNSPECIFIED JOINT: ICD-10-CM

## 2024-11-14 DIAGNOSIS — E78.00 PURE HYPERCHOLESTEROLEMIA: ICD-10-CM

## 2024-11-14 DIAGNOSIS — T50.905A ADVERSE EFFECT OF DRUG, INITIAL ENCOUNTER: ICD-10-CM

## 2024-11-14 DIAGNOSIS — Z98.84 HX OF GASTRIC BYPASS: ICD-10-CM

## 2024-11-14 LAB
ALBUMIN SERPL BCG-MCNC: 4.5 G/DL (ref 3.5–5)
ALP SERPL-CCNC: 98 U/L (ref 34–104)
ALT SERPL W P-5'-P-CCNC: 24 U/L (ref 7–52)
ANION GAP SERPL CALCULATED.3IONS-SCNC: 7 MMOL/L (ref 4–13)
AST SERPL W P-5'-P-CCNC: 26 U/L (ref 13–39)
BASOPHILS # BLD AUTO: 0.04 THOUSANDS/ÂΜL (ref 0–0.1)
BASOPHILS NFR BLD AUTO: 1 % (ref 0–1)
BILIRUB SERPL-MCNC: 0.51 MG/DL (ref 0.2–1)
BUN SERPL-MCNC: 17 MG/DL (ref 5–25)
CALCIUM SERPL-MCNC: 9.2 MG/DL (ref 8.4–10.2)
CHLORIDE SERPL-SCNC: 108 MMOL/L (ref 96–108)
CHOLEST SERPL-MCNC: 132 MG/DL (ref ?–200)
CO2 SERPL-SCNC: 27 MMOL/L (ref 21–32)
CREAT SERPL-MCNC: 1.04 MG/DL (ref 0.6–1.3)
CREAT UR-MCNC: 57.7 MG/DL
EOSINOPHIL # BLD AUTO: 0.12 THOUSAND/ÂΜL (ref 0–0.61)
EOSINOPHIL NFR BLD AUTO: 2 % (ref 0–6)
ERYTHROCYTE [DISTWIDTH] IN BLOOD BY AUTOMATED COUNT: 12.9 % (ref 11.6–15.1)
GFR SERPL CREATININE-BSD FRML MDRD: 83 ML/MIN/1.73SQ M
GLUCOSE SERPL-MCNC: 114 MG/DL (ref 65–140)
HCT VFR BLD AUTO: 49 % (ref 36.5–49.3)
HDLC SERPL-MCNC: 45 MG/DL
HGB BLD-MCNC: 15.5 G/DL (ref 12–17)
IMM GRANULOCYTES # BLD AUTO: 0.02 THOUSAND/UL (ref 0–0.2)
IMM GRANULOCYTES NFR BLD AUTO: 0 % (ref 0–2)
LDLC SERPL CALC-MCNC: 56 MG/DL (ref 0–100)
LYMPHOCYTES # BLD AUTO: 1.7 THOUSANDS/ÂΜL (ref 0.6–4.47)
LYMPHOCYTES NFR BLD AUTO: 21 % (ref 14–44)
MCH RBC QN AUTO: 29.5 PG (ref 26.8–34.3)
MCHC RBC AUTO-ENTMCNC: 31.6 G/DL (ref 31.4–37.4)
MCV RBC AUTO: 93 FL (ref 82–98)
MICROALBUMIN UR-MCNC: <7 MG/L
MONOCYTES # BLD AUTO: 0.76 THOUSAND/ÂΜL (ref 0.17–1.22)
MONOCYTES NFR BLD AUTO: 9 % (ref 4–12)
NEUTROPHILS # BLD AUTO: 5.44 THOUSANDS/ÂΜL (ref 1.85–7.62)
NEUTS SEG NFR BLD AUTO: 67 % (ref 43–75)
NRBC BLD AUTO-RTO: 0 /100 WBCS
PLATELET # BLD AUTO: 288 THOUSANDS/UL (ref 149–390)
PMV BLD AUTO: 10.8 FL (ref 8.9–12.7)
POTASSIUM SERPL-SCNC: 4.2 MMOL/L (ref 3.5–5.3)
PROT SERPL-MCNC: 6.9 G/DL (ref 6.4–8.4)
RBC # BLD AUTO: 5.25 MILLION/UL (ref 3.88–5.62)
SL AMB POCT HEMOGLOBIN AIC: 6.6 (ref ?–6.5)
SODIUM SERPL-SCNC: 142 MMOL/L (ref 135–147)
TRIGL SERPL-MCNC: 157 MG/DL (ref ?–150)
TSH SERPL DL<=0.05 MIU/L-ACNC: 2.05 UIU/ML (ref 0.45–4.5)
WBC # BLD AUTO: 8.08 THOUSAND/UL (ref 4.31–10.16)

## 2024-11-14 PROCEDURE — 99214 OFFICE O/P EST MOD 30 MIN: CPT | Performed by: FAMILY MEDICINE

## 2024-11-14 PROCEDURE — 82570 ASSAY OF URINE CREATININE: CPT

## 2024-11-14 PROCEDURE — 85025 COMPLETE CBC W/AUTO DIFF WBC: CPT

## 2024-11-14 PROCEDURE — 80053 COMPREHEN METABOLIC PANEL: CPT

## 2024-11-14 PROCEDURE — 83036 HEMOGLOBIN GLYCOSYLATED A1C: CPT | Performed by: FAMILY MEDICINE

## 2024-11-14 PROCEDURE — 82043 UR ALBUMIN QUANTITATIVE: CPT

## 2024-11-14 PROCEDURE — 80061 LIPID PANEL: CPT

## 2024-11-14 PROCEDURE — 36415 COLL VENOUS BLD VENIPUNCTURE: CPT

## 2024-11-14 PROCEDURE — 84443 ASSAY THYROID STIM HORMONE: CPT

## 2024-11-14 RX ORDER — MECLIZINE HYDROCHLORIDE 25 MG/1
12.5 TABLET ORAL EVERY 8 HOURS PRN
Qty: 30 TABLET | Refills: 0 | Status: SHIPPED | OUTPATIENT
Start: 2024-11-14

## 2024-11-14 NOTE — ASSESSMENT & PLAN NOTE
Patient to complete blood work today    Orders:    Albumin / creatinine urine ratio; Future    Comprehensive metabolic panel; Future    Hemoglobin A1C; Future    CBC and Platelet; Future    Lipid Panel with Direct LDL reflex; Future    TSH, 3rd generation with Free T4 reflex; Future

## 2024-11-14 NOTE — ASSESSMENT & PLAN NOTE
.  BMI 31.66 patient gained 1 pound diet exercise weight loss recommended    Orders:    Albumin / creatinine urine ratio; Future    Comprehensive metabolic panel; Future    Hemoglobin A1C; Future    CBC and Platelet; Future    Lipid Panel with Direct LDL reflex; Future    TSH, 3rd generation with Free T4 reflex; Future

## 2024-11-14 NOTE — ASSESSMENT & PLAN NOTE
Check CTA head and neck, meclizine as ordered drowsiness discussed patient to see ENT and physical therapy already has orders    Orders:    CTA head and neck w wo contrast; Future    Albumin / creatinine urine ratio; Future    Comprehensive metabolic panel; Future    Hemoglobin A1C; Future    CBC and Platelet; Future    Lipid Panel with Direct LDL reflex; Future    TSH, 3rd generation with Free T4 reflex; Future

## 2024-11-14 NOTE — ASSESSMENT & PLAN NOTE
On CPAP follows with sleep specialist    Orders:    Albumin / creatinine urine ratio; Future    Comprehensive metabolic panel; Future    Hemoglobin A1C; Future    CBC and Platelet; Future    Lipid Panel with Direct LDL reflex; Future    TSH, 3rd generation with Free T4 reflex; Future

## 2024-11-14 NOTE — ASSESSMENT & PLAN NOTE
Orders:    CTA head and neck w wo contrast; Future    Albumin / creatinine urine ratio; Future    Comprehensive metabolic panel; Future    Hemoglobin A1C; Future    CBC and Platelet; Future    Lipid Panel with Direct LDL reflex; Future    TSH, 3rd generation with Free T4 reflex; Future

## 2024-11-14 NOTE — PROGRESS NOTES
Name: Ej Gastelum      : 1975      MRN: [id number]  Encounter Provider: Pramod Jack DO  Encounter Date: 2024   Encounter department: [facility]  :    Assessment & Plan  Type 2 diabetes mellitus without complication, without long-term current use of insulin (HCC)    Lab Results   Component Value Date    HGBA1C 6.8 (H) 2024   Stable fingerstick hemoglobin A1c completed continue Jardiance 10 mg daily.  Hemoglobin A1c 6.6 in office today    Orders:    POCT hemoglobin A1c    Albumin / creatinine urine ratio; Future    Comprehensive metabolic panel; Future    Hemoglobin A1C; Future    CBC and Platelet; Future    Lipid Panel with Direct LDL reflex; Future    TSH, 3rd generation with Free T4 reflex; Future    Vertigo  Check CTA head and neck, meclizine as ordered drowsiness discussed patient to see ENT and physical therapy already has orders    Orders:    CTA head and neck w wo contrast; Future    Albumin / creatinine urine ratio; Future    Comprehensive metabolic panel; Future    Hemoglobin A1C; Future    CBC and Platelet; Future    Lipid Panel with Direct LDL reflex; Future    TSH, 3rd generation with Free T4 reflex; Future    Vertebral artery disease (HCC)    Orders:    CTA head and neck w wo contrast; Future    Albumin / creatinine urine ratio; Future    Comprehensive metabolic panel; Future    Hemoglobin A1C; Future    CBC and Platelet; Future    Lipid Panel with Direct LDL reflex; Future    TSH, 3rd generation with Free T4 reflex; Future    Pure hypercholesterolemia  Patient to complete blood work today    Orders:    Albumin / creatinine urine ratio; Future    Comprehensive metabolic panel; Future    Hemoglobin A1C; Future    CBC and Platelet; Future    Lipid Panel with Direct LDL reflex; Future    TSH, 3rd generation with Free T4 reflex; Future    Obesity (BMI 30-39.9)  .  BMI 31.66 patient gained 1 pound diet exercise weight loss recommended    Orders:    Albumin /  "creatinine urine ratio; Future    Comprehensive metabolic panel; Future    Hemoglobin A1C; Future    CBC and Platelet; Future    Lipid Panel with Direct LDL reflex; Future    TSH, 3rd generation with Free T4 reflex; Future    ENMA on CPAP  On CPAP follows with sleep specialist    Orders:    Albumin / creatinine urine ratio; Future    Comprehensive metabolic panel; Future    Hemoglobin A1C; Future    CBC and Platelet; Future    Lipid Panel with Direct LDL reflex; Future    TSH, 3rd generation with Free T4 reflex; Future        1.  Type 2 diabetes, stable on Jardiance hemoglobin A1c in office, 6.6 continue present therapy  2.  Vertigo  3.  Vertebral artery disease  Check CTA head and neck  Follow with ENT and physical therapy  Meclizine as ordered drowsiness discussed  4.  BMI 31.66 diet exercise weight loss recommended  5.  Hyperlipidemia on Crestor patient to complete blood work today  6.  Prostate cancer screening, patient quit blood work which includes PSA  7.  Return in 6 months for office visit blood work sooner if needed           History of Present Illness     For the past month or so patient's had episodes of vertigo.  Patient was seen in the ER few weeks ago.  At that time neuroexam was normal no further testing was completed but told to see family doctor.  Patient's home sugars have been excellent usually between 100-120.  Patient does have ENT consultation and has order for physical therapy for vestibular therapy from the ER      Review of Systems   Constitutional: Negative.    HENT: Negative.     Eyes: Negative.    Respiratory: Negative.     Cardiovascular: Negative.    Gastrointestinal: Negative.    Endocrine:        HPI   Genitourinary: Negative.    Musculoskeletal: Negative.    Skin: Negative.    Allergic/Immunologic: Negative.    Neurological:         HPI   Hematological: Negative.    Psychiatric/Behavioral: Negative.            Objective   /80   Pulse 57   Ht 6' 1\" (1.854 m)   Wt 109 kg (240 " lb)   SpO2 96%   BMI 31.66 kg/m²      Physical Exam  Vitals and nursing note reviewed.   Constitutional:       Appearance: Normal appearance.   HENT:      Head: Normocephalic and atraumatic.      Ears:      Comments: Bilateral otosclerosis, chronic     Nose: Nose normal.      Mouth/Throat:      Mouth: Mucous membranes are moist.      Pharynx: Oropharynx is clear. No oropharyngeal exudate or posterior oropharyngeal erythema.   Eyes:      General: No scleral icterus.  Neck:      Vascular: No carotid bruit.   Cardiovascular:      Rate and Rhythm: Normal rate and regular rhythm.      Heart sounds: Normal heart sounds.   Pulmonary:      Effort: Pulmonary effort is normal.      Breath sounds: Normal breath sounds.   Abdominal:      General: Bowel sounds are normal.      Palpations: Abdomen is soft.      Tenderness: There is no abdominal tenderness.   Musculoskeletal:      Cervical back: Neck supple.      Right lower leg: No edema.      Left lower leg: No edema.   Skin:     General: Skin is warm and dry.   Neurological:      General: No focal deficit present.      Mental Status: He is alert and oriented to person, place, and time.      Cranial Nerves: No cranial nerve deficit.      Sensory: No sensory deficit.      Motor: No weakness.      Coordination: Coordination normal.      Gait: Gait normal.      Comments: Romberg negative   Psychiatric:         Mood and Affect: Mood normal.         Behavior: Behavior normal.         Thought Content: Thought content normal.         Judgment: Judgment normal.

## 2024-11-14 NOTE — PATIENT INSTRUCTIONS
Continue present therapy and follow the ENT as planned  Follow with physical therapy  Use meclizine every 8 hours as needed for dizziness  If not improving please return to office otherwise return in 6 months for office visit blood work  Complete blood work today as ordered even if nonfasting

## 2024-11-14 NOTE — ASSESSMENT & PLAN NOTE
Lab Results   Component Value Date    HGBA1C 6.8 (H) 07/24/2024   Stable fingerstick hemoglobin A1c completed continue Jardiance 10 mg daily.  Hemoglobin A1c 6.6 in office today    Orders:    POCT hemoglobin A1c    Albumin / creatinine urine ratio; Future    Comprehensive metabolic panel; Future    Hemoglobin A1C; Future    CBC and Platelet; Future    Lipid Panel with Direct LDL reflex; Future    TSH, 3rd generation with Free T4 reflex; Future

## 2024-11-15 ENCOUNTER — RESULTS FOLLOW-UP (OUTPATIENT)
Dept: FAMILY MEDICINE CLINIC | Facility: CLINIC | Age: 49
End: 2024-11-15

## 2024-11-21 ENCOUNTER — HOSPITAL ENCOUNTER (OUTPATIENT)
Dept: CT IMAGING | Facility: HOSPITAL | Age: 49
Discharge: HOME | End: 2024-11-21
Payer: COMMERCIAL

## 2024-11-21 DIAGNOSIS — R42 VERTIGO: ICD-10-CM

## 2024-11-21 DIAGNOSIS — I77.9 VERTEBRAL ARTERY DISEASE (HCC): ICD-10-CM

## 2024-11-21 PROCEDURE — 70498 CT ANGIOGRAPHY NECK: CPT

## 2024-11-21 PROCEDURE — 70496 CT ANGIOGRAPHY HEAD: CPT

## 2024-11-21 RX ADMIN — IOHEXOL 85 ML: 350 INJECTION, SOLUTION INTRAVENOUS at 18:30

## 2024-11-25 ENCOUNTER — RESULTS FOLLOW-UP (OUTPATIENT)
Dept: FAMILY MEDICINE CLINIC | Facility: CLINIC | Age: 49
End: 2024-11-25

## 2024-11-27 ENCOUNTER — APPOINTMENT (OUTPATIENT)
Dept: RADIOLOGY | Facility: MEDICAL CENTER | Age: 49
End: 2024-11-27
Payer: OTHER MISCELLANEOUS

## 2024-11-27 ENCOUNTER — OCCMED (OUTPATIENT)
Dept: URGENT CARE | Facility: MEDICAL CENTER | Age: 49
End: 2024-11-27
Payer: OTHER MISCELLANEOUS

## 2024-11-27 DIAGNOSIS — S89.91XA KNEE INJURY, RIGHT, INITIAL ENCOUNTER: Primary | ICD-10-CM

## 2024-11-27 DIAGNOSIS — S89.91XA KNEE INJURY, RIGHT, INITIAL ENCOUNTER: ICD-10-CM

## 2024-11-27 PROCEDURE — 99283 EMERGENCY DEPT VISIT LOW MDM: CPT

## 2024-11-27 PROCEDURE — G0382 LEV 3 HOSP TYPE B ED VISIT: HCPCS

## 2024-11-27 PROCEDURE — 73564 X-RAY EXAM KNEE 4 OR MORE: CPT

## 2024-12-02 ENCOUNTER — APPOINTMENT (OUTPATIENT)
Dept: URGENT CARE | Facility: MEDICAL CENTER | Age: 49
End: 2024-12-02
Payer: OTHER MISCELLANEOUS

## 2024-12-02 PROCEDURE — 99213 OFFICE O/P EST LOW 20 MIN: CPT | Performed by: NURSE PRACTITIONER

## 2024-12-12 ENCOUNTER — EVALUATION (OUTPATIENT)
Facility: REHABILITATION | Age: 49
End: 2024-12-12
Payer: COMMERCIAL

## 2024-12-12 DIAGNOSIS — H81.10 BPPV (BENIGN PAROXYSMAL POSITIONAL VERTIGO): Primary | ICD-10-CM

## 2024-12-12 PROCEDURE — 97162 PT EVAL MOD COMPLEX 30 MIN: CPT | Performed by: PHYSICAL THERAPIST

## 2024-12-12 NOTE — PROGRESS NOTES
"PT Evaluation     Today's date: 2024  Patient name: Ej Gastelum  : 1975  MRN: [id number]  Referring provider: Elaine Talavera MD  Dx:   Encounter Diagnosis     ICD-10-CM    1. BPPV (benign paroxysmal positional vertigo)  H81.10 Ambulatory Referral to Physical Therapy          Start Time: 0812  Stop Time: 0850  Total time in clinic (min): 38 minutes    Assessment    Assessment details: Ej reports to PT with CC of dizziness episodes. Results of evaluation indicate normal oculomotor coordination, normal cervical AROM, normal VOR, and negative positional testing for BPPV. No dizziness recreated with testing today. At this time his dizziness episodes have resolved. Based on pt reports, he could have been experiencing BPPV or vestibular neuritis, which has resolved before coming in for eval today. Provided education on sources of dizziness and recommend he return if symptoms return in the future. PT not indicated at this time.     Goals  N/A    Plan    Plan of Care beginning date: 2024  Plan of Care expiration date: 2024  Treatment plan discussed with: patient  Plan details: PT not indicated at this time.         Subjective Evaluation    History of Present Illness  Mechanism of injury: Ej comes to PT with CC of dizziness episodes. On  he woke up with dizziness while trying to get out of bed in the morning. Describes it as \"warping\" had some difficulty walking and going down the stairs. Lasted for hours. Went to the ED was cleared. He saw his PCP about 10 days later and was ordered a CT scan of the brain and neck which was negative. He only had 2 episodes of getting up in the morning with dizziness, and has been sx free since.     Only trigger for his sx's was getting up in the morning. Not occurring anymore.     He works as a paramedic. He does note that when he has been kneeling down over a patient for a long time and stand up quickly he can feel dizzy for about 10 seconds. He is off " of work due to an injury at this time.     No functional limitations related to dizziness  Pain  Location: R knee        Objective    Vitals  BP: 108/72  HR: 58  SpO2: 96%           Red Flags / Other Screening:  Tinnitus:No  Aural Fullness:No  Known hearing loss:No  Nausea, Vomiting: No  Altered Vision: No  Memory Loss: No  Recent Medication Changes: No  Peripheral Neuropathy:No  Cervical Pain: Yes chronic   Headache: No      VESTIBULAR OCULAR SCREEN:  Oculomotor ROM : WNL   Resting nystagmus: No  Gaze holding nystagmus No   Smooth pursuit Normal    Vertical Saccades:Normal  Horizontal Saccades:Normal  Convergence: Normal    Cover/Uncover/Crosscover Test: Normal    Head thrust (room light): Normal      VOR x1 test: Normal         Cervical AROM:      Flexion WFL    Extension WFL     Right  Left   Rotation  WFL WFL   Side Bending          Positional testing: Right Left   [place]   (-)  (-)   Side lying [place]     Roll test:  (-)  (-)       BALANCE ASSESSMENT   MCTSIB  Eval     Eyes open firm surface np    Eyes closed firm surface     Eyes open foam surface     Eyes closed foam surface          FGA:  np            DHI: 6   0-30 mild , 30-60 moderate,  severe disability             Precautions: Type II DM       Manuals                                                                 Neuro Re-Ed                                                                                                        Ther Ex                                                                                                                     Ther Activity                                       Gait Training                                       Modalities

## 2025-01-02 DIAGNOSIS — E78.00 PURE HYPERCHOLESTEROLEMIA: ICD-10-CM

## 2025-01-02 DIAGNOSIS — E11.9 TYPE 2 DIABETES MELLITUS WITHOUT COMPLICATION, WITHOUT LONG-TERM CURRENT USE OF INSULIN (HCC): ICD-10-CM

## 2025-01-03 RX ORDER — ROSUVASTATIN CALCIUM 5 MG/1
5 TABLET, COATED ORAL DAILY
Qty: 90 TABLET | Refills: 1 | Status: SHIPPED | OUTPATIENT
Start: 2025-01-03

## 2025-04-24 ENCOUNTER — OFFICE VISIT (OUTPATIENT)
Dept: SLEEP CENTER | Facility: CLINIC | Age: 50
End: 2025-04-24
Payer: COMMERCIAL

## 2025-04-24 ENCOUNTER — TELEPHONE (OUTPATIENT)
Dept: SLEEP CENTER | Facility: CLINIC | Age: 50
End: 2025-04-24

## 2025-04-24 VITALS
DIASTOLIC BLOOD PRESSURE: 69 MMHG | OXYGEN SATURATION: 95 % | HEIGHT: 73 IN | SYSTOLIC BLOOD PRESSURE: 110 MMHG | BODY MASS INDEX: 31.14 KG/M2 | WEIGHT: 235 LBS | HEART RATE: 68 BPM

## 2025-04-24 DIAGNOSIS — G47.33 OSA ON CPAP: Primary | ICD-10-CM

## 2025-04-24 LAB

## 2025-04-24 PROCEDURE — 99214 OFFICE O/P EST MOD 30 MIN: CPT | Performed by: PHYSICIAN ASSISTANT

## 2025-04-24 NOTE — ASSESSMENT & PLAN NOTE
Patient has a history of mild obstructive sleep apnea.  He uses his CPAP consistently.  He finds it to be beneficial and effective.  He will be switching to a nasal style mask today and continued use of chinstrap that he orders online.  Pressure change was ordered today based on his compliance data.  Patient will bring in his SD card to review his compliance with the pressure change in 3 to 4 weeks.  Otherwise follow-up in 1 year or sooner if he has any concerns.  Orders:    Mask fitting only; Future    PAP DME Resupply/Reorder    PAP DME Pressure Change

## 2025-04-24 NOTE — PATIENT INSTRUCTIONS
I placed an order today for a mask fitting so that you may be able to get a nasal style mask as you asked.  I also placed an order for new supplies.  I placed a pressure change as previously recommended by Dr. Cueto to 6 to 10 cm of H2O.  Please take your SD card and have a download approximately 3 to 4 weeks after the pressure changes made to that I can review your compliance data.  I will then message you in [employer] regarding your results.

## 2025-04-24 NOTE — PROGRESS NOTES
Name: Ej Gastelum      : 1975      MRN: [id number]  Encounter Provider: Opal White PA-C  Encounter Date: 2025   Encounter department: [facility]  :  Assessment & Plan  ENMA on CPAP  Patient has a history of mild obstructive sleep apnea.  He uses his CPAP consistently.  He finds it to be beneficial and effective.  He will be switching to a nasal style mask today and continued use of chinstrap that he orders online.  Pressure change was ordered today based on his compliance data.  Patient will bring in his SD card to review his compliance with the pressure change in 3 to 4 weeks.  Otherwise follow-up in 1 year or sooner if he has any concerns.  Orders:    Mask fitting only; Future    PAP DME Resupply/Reorder    PAP DME Pressure Change        History of Present Illness   HPI Ej Gastelum is a 50-year-old male with a significant past medical history of gastric bypass surgery, allergic rhinitis, and type 2 diabetes who presents today in follow-up of mild obstructive sleep apnea.  He completed a home sleep study on 2022 at a weight of 250 pounds with an EZ of 5.3.  He then began use of auto CPAP.  He presents today to review compliance and effectiveness of treatment.  He continues to use his CPAP nightly.  He has no significant complaints or concerns today.  He states he would like to switch to a nasal mask versus a nasal pillow or nasal cradle as sometimes he gets nasal irritation.  He orders a chinstrap online which he prefers to the one available at [employer].  He finds it to be thicker and better quality.  He states at his last office visit Dr. Cueto ordered a pressure change but it never occurred.    CPAP  ResMed AirSense 10 set up on 2023, no modem  Pressure set at 5 to 20 cm of H2O, pressure change ordered to 6 to 10 cm of H2O today  Mask: has nasal pillow or cradle, wants to try nasal, mask fitting ordered today  DME provider: [facility]  "health    Compliance  84/90 days, 93%  Greater than 4 hours 79%  Average session of 5 hours and 4 minutes nightly  Residual AHI of 0.5      Sitting and reading: (Patient-Rptd) (P) Slight chance of dozing  Watching TV: (Patient-Rptd) (P) Slight chance of dozing  Sitting, inactive in a public place (e.g. a theatre or a meeting): (Patient-Rptd) (P) Slight chance of dozing  As a passenger in a car for an hour without a break: (Patient-Rptd) (P) Would never doze  Lying down to rest in the afternoon when circumstances permit: (Patient-Rptd) (P) Moderate chance of dozing  Sitting and talking to someone: (Patient-Rptd) (P) Would never doze  Sitting quietly after a lunch without alcohol: (Patient-Rptd) (P) Slight chance of dozing  In a car, while stopped for a few minutes in traffic: (Patient-Rptd) (P) Would never doze  Total score: (Patient-Rptd) (P) 6     Review of Systems   Constitutional:  Negative for fatigue and unexpected weight change.   HENT:  Negative for congestion, nosebleeds and rhinorrhea.         Nasal irritation   Respiratory:  Negative for cough, shortness of breath and wheezing.    Cardiovascular:  Negative for palpitations and leg swelling.   Allergic/Immunologic: Negative for environmental allergies.   Neurological:  Negative for headaches.   Psychiatric/Behavioral:  Negative for decreased concentration. The patient is not nervous/anxious.      Pertinent positives/negatives included in HPI and also as noted:       Objective   /69 (BP Location: Right arm, Patient Position: Sitting, Cuff Size: Standard)   Pulse 68   Ht 6' 1\" (1.854 m)   Wt 107 kg (235 lb)   SpO2 95%   BMI 31.00 kg/m²        Physical Exam    Data  Lab Results   Component Value Date    HGB 15.5 11/14/2024    HCT 49.0 11/14/2024    MCV 93 11/14/2024      Lab Results   Component Value Date    CALCIUM 9.2 11/14/2024    K 4.2 11/14/2024    CO2 27 11/14/2024     11/14/2024    BUN 17 11/14/2024    CREATININE 1.04 11/14/2024     No " "results found for: \"IRON\", \"TIBC\", \"FERRITIN\"  Lab Results   Component Value Date    AST 26 11/14/2024    ALT 24 11/14/2024         "

## 2025-04-25 ENCOUNTER — TELEPHONE (OUTPATIENT)
Dept: SLEEP CENTER | Facility: CLINIC | Age: 50
End: 2025-04-25

## 2025-04-25 NOTE — TELEPHONE ENCOUNTER
Pt was seen in [facility]. I did a manual pressure change on his S10 machine. Now set at 6-10cm and did a mask fitting. He was fit and provided with the airfit N20 size medium.

## 2025-04-28 LAB
DME PARACHUTE DELIVERY DATE ACTUAL: NORMAL
DME PARACHUTE DELIVERY DATE REQUESTED: NORMAL
DME PARACHUTE ITEM DESCRIPTION: NORMAL
DME PARACHUTE ORDER STATUS: NORMAL
DME PARACHUTE SUPPLIER NAME: NORMAL
DME PARACHUTE SUPPLIER PHONE: NORMAL

## 2025-05-15 ENCOUNTER — APPOINTMENT (OUTPATIENT)
Dept: LAB | Facility: CLINIC | Age: 50
End: 2025-05-15
Payer: COMMERCIAL

## 2025-05-15 DIAGNOSIS — E11.9 TYPE 2 DIABETES MELLITUS WITHOUT COMPLICATION, WITHOUT LONG-TERM CURRENT USE OF INSULIN (HCC): ICD-10-CM

## 2025-05-15 DIAGNOSIS — R42 VERTIGO: ICD-10-CM

## 2025-05-15 DIAGNOSIS — E78.00 PURE HYPERCHOLESTEROLEMIA: ICD-10-CM

## 2025-05-15 DIAGNOSIS — G47.33 OSA ON CPAP: ICD-10-CM

## 2025-05-15 DIAGNOSIS — E66.9 OBESITY (BMI 30-39.9): ICD-10-CM

## 2025-05-15 DIAGNOSIS — I77.9 VERTEBRAL ARTERY DISEASE (HCC): ICD-10-CM

## 2025-05-15 LAB
ALBUMIN SERPL BCG-MCNC: 4.2 G/DL (ref 3.5–5)
ALP SERPL-CCNC: 120 U/L (ref 34–104)
ALT SERPL W P-5'-P-CCNC: 28 U/L (ref 7–52)
ANION GAP SERPL CALCULATED.3IONS-SCNC: 10 MMOL/L (ref 4–13)
AST SERPL W P-5'-P-CCNC: 25 U/L (ref 13–39)
BILIRUB SERPL-MCNC: 0.79 MG/DL (ref 0.2–1)
BUN SERPL-MCNC: 18 MG/DL (ref 5–25)
CALCIUM SERPL-MCNC: 8.9 MG/DL (ref 8.4–10.2)
CHLORIDE SERPL-SCNC: 106 MMOL/L (ref 96–108)
CHOLEST SERPL-MCNC: 109 MG/DL (ref ?–200)
CO2 SERPL-SCNC: 26 MMOL/L (ref 21–32)
CREAT SERPL-MCNC: 0.82 MG/DL (ref 0.6–1.3)
CREAT UR-MCNC: 89.5 MG/DL
ERYTHROCYTE [DISTWIDTH] IN BLOOD BY AUTOMATED COUNT: 13.3 % (ref 11.6–15.1)
EST. AVERAGE GLUCOSE BLD GHB EST-MCNC: 160 MG/DL
GFR SERPL CREATININE-BSD FRML MDRD: 102 ML/MIN/1.73SQ M
GLUCOSE P FAST SERPL-MCNC: 121 MG/DL (ref 65–99)
HBA1C MFR BLD: 7.2 %
HCT VFR BLD AUTO: 46.7 % (ref 36.5–49.3)
HDLC SERPL-MCNC: 52 MG/DL
HGB BLD-MCNC: 14.8 G/DL (ref 12–17)
LDLC SERPL CALC-MCNC: 45 MG/DL (ref 0–100)
MCH RBC QN AUTO: 29 PG (ref 26.8–34.3)
MCHC RBC AUTO-ENTMCNC: 31.7 G/DL (ref 31.4–37.4)
MCV RBC AUTO: 91 FL (ref 82–98)
MICROALBUMIN UR-MCNC: <7 MG/L
PLATELET # BLD AUTO: 258 THOUSANDS/UL (ref 149–390)
PMV BLD AUTO: 10.8 FL (ref 8.9–12.7)
POTASSIUM SERPL-SCNC: 4.3 MMOL/L (ref 3.5–5.3)
PROT SERPL-MCNC: 6.5 G/DL (ref 6.4–8.4)
RBC # BLD AUTO: 5.11 MILLION/UL (ref 3.88–5.62)
SODIUM SERPL-SCNC: 142 MMOL/L (ref 135–147)
TRIGL SERPL-MCNC: 62 MG/DL (ref ?–150)
TSH SERPL DL<=0.05 MIU/L-ACNC: 2.59 UIU/ML (ref 0.45–4.5)
WBC # BLD AUTO: 6.03 THOUSAND/UL (ref 4.31–10.16)

## 2025-05-15 PROCEDURE — 80053 COMPREHEN METABOLIC PANEL: CPT

## 2025-05-15 PROCEDURE — 85027 COMPLETE CBC AUTOMATED: CPT

## 2025-05-15 PROCEDURE — 36415 COLL VENOUS BLD VENIPUNCTURE: CPT

## 2025-05-15 PROCEDURE — 80061 LIPID PANEL: CPT

## 2025-05-15 PROCEDURE — 84443 ASSAY THYROID STIM HORMONE: CPT

## 2025-05-15 PROCEDURE — 83036 HEMOGLOBIN GLYCOSYLATED A1C: CPT

## 2025-05-15 PROCEDURE — 82570 ASSAY OF URINE CREATININE: CPT

## 2025-05-15 PROCEDURE — 82043 UR ALBUMIN QUANTITATIVE: CPT

## 2025-05-22 ENCOUNTER — RESULTS FOLLOW-UP (OUTPATIENT)
Dept: FAMILY MEDICINE CLINIC | Facility: CLINIC | Age: 50
End: 2025-05-22

## 2025-05-22 ENCOUNTER — OFFICE VISIT (OUTPATIENT)
Dept: FAMILY MEDICINE CLINIC | Facility: CLINIC | Age: 50
End: 2025-05-22
Payer: COMMERCIAL

## 2025-05-22 VITALS
HEIGHT: 73 IN | RESPIRATION RATE: 14 BRPM | BODY MASS INDEX: 30.88 KG/M2 | DIASTOLIC BLOOD PRESSURE: 60 MMHG | SYSTOLIC BLOOD PRESSURE: 98 MMHG | HEART RATE: 70 BPM | WEIGHT: 233 LBS

## 2025-05-22 DIAGNOSIS — E11.9 TYPE 2 DIABETES MELLITUS WITHOUT COMPLICATION, WITHOUT LONG-TERM CURRENT USE OF INSULIN (HCC): Primary | ICD-10-CM

## 2025-05-22 DIAGNOSIS — Z12.5 SCREENING FOR PROSTATE CANCER: ICD-10-CM

## 2025-05-22 DIAGNOSIS — E78.00 PURE HYPERCHOLESTEROLEMIA: ICD-10-CM

## 2025-05-22 DIAGNOSIS — I77.9 VERTEBRAL ARTERY DISEASE (HCC): ICD-10-CM

## 2025-05-22 DIAGNOSIS — E66.9 OBESITY (BMI 30-39.9): ICD-10-CM

## 2025-05-22 DIAGNOSIS — R09.89 PULMONARY AIR TRAPPING: ICD-10-CM

## 2025-05-22 DIAGNOSIS — G47.33 OSA ON CPAP: ICD-10-CM

## 2025-05-22 PROCEDURE — 92250 FUNDUS PHOTOGRAPHY W/I&R: CPT | Performed by: FAMILY MEDICINE

## 2025-05-22 PROCEDURE — 99214 OFFICE O/P EST MOD 30 MIN: CPT | Performed by: FAMILY MEDICINE

## 2025-05-22 NOTE — ASSESSMENT & PLAN NOTE
Lungs are clear follows with pulmonary medicine  Orders:  •  Albumin / creatinine urine ratio; Future  •  Comprehensive metabolic panel; Future  •  Hemoglobin A1C; Future  •  CBC and Platelet; Future  •  Lipid Panel with Direct LDL reflex; Future  •  TSH, 3rd generation with Free T4 reflex; Future

## 2025-05-22 NOTE — ASSESSMENT & PLAN NOTE
Stable follows sleep specialty  Orders:  •  Albumin / creatinine urine ratio; Future  •  Comprehensive metabolic panel; Future  •  Hemoglobin A1C; Future  •  CBC and Platelet; Future  •  Lipid Panel with Direct LDL reflex; Future  •  TSH, 3rd generation with Free T4 reflex; Future

## 2025-05-22 NOTE — ASSESSMENT & PLAN NOTE
Patient mated to not taking some medications because moving.  He is back on his Jardiance 10 mg daily fastings have been in the 110 range.  Will check hemoglobin A1c in 3 months and return in 6 months  IRIS exam completed  Lab Results   Component Value Date    HGBA1C 7.2 (H) 05/15/2025       Orders:  •  IRIS Diabetic eye exam  •  Albumin / creatinine urine ratio; Future  •  Comprehensive metabolic panel; Future  •  Hemoglobin A1C; Future  •  CBC and Platelet; Future  •  Lipid Panel with Direct LDL reflex; Future  •  TSH, 3rd generation with Free T4 reflex; Future  •  Hemoglobin A1C; Future

## 2025-05-22 NOTE — ASSESSMENT & PLAN NOTE
BMI 30.74 Lost 2 pounds continue diet exercise weight loss  Orders:  •  Albumin / creatinine urine ratio; Future  •  Comprehensive metabolic panel; Future  •  Hemoglobin A1C; Future  •  CBC and Platelet; Future  •  Lipid Panel with Direct LDL reflex; Future  •  TSH, 3rd generation with Free T4 reflex; Future

## 2025-05-22 NOTE — PROGRESS NOTES
Name: Ej Gastelum      : 1975      MRN: [id number]  Encounter Provider: Pramod Jack DO  Encounter Date: 2025   Encounter department: [facility] return in 6 months for office visit, annual exam, and blood work sooner if needed  :  Assessment & Plan  Type 2 diabetes mellitus without complication, without long-term current use of insulin (HCC)  Patient mated to not taking some medications because moving.  He is back on his Jardiance 10 mg daily fastings have been in the 110 range.  Will check hemoglobin A1c in 3 months and return in 6 months  IRIS exam completed  Lab Results   Component Value Date    HGBA1C 7.2 (H) 05/15/2025       Orders:  •  IRIS Diabetic eye exam  •  Albumin / creatinine urine ratio; Future  •  Comprehensive metabolic panel; Future  •  Hemoglobin A1C; Future  •  CBC and Platelet; Future  •  Lipid Panel with Direct LDL reflex; Future  •  TSH, 3rd generation with Free T4 reflex; Future  •  Hemoglobin A1C; Future    Vertebral artery disease (HCC)  Stable continue Crestor 5 mg daily  Orders:  •  Albumin / creatinine urine ratio; Future  •  Comprehensive metabolic panel; Future  •  Hemoglobin A1C; Future  •  CBC and Platelet; Future  •  Lipid Panel with Direct LDL reflex; Future  •  TSH, 3rd generation with Free T4 reflex; Future    ENMA on CPAP  Stable follows sleep specialty  Orders:  •  Albumin / creatinine urine ratio; Future  •  Comprehensive metabolic panel; Future  •  Hemoglobin A1C; Future  •  CBC and Platelet; Future  •  Lipid Panel with Direct LDL reflex; Future  •  TSH, 3rd generation with Free T4 reflex; Future    Obesity (BMI 30-39.9)   BMI 30.74 Lost 2 pounds continue diet exercise weight loss  Orders:  •  Albumin / creatinine urine ratio; Future  •  Comprehensive metabolic panel; Future  •  Hemoglobin A1C; Future  •  CBC and Platelet; Future  •  Lipid Panel with Direct LDL reflex; Future  •  TSH, 3rd generation with Free T4 reflex;  "Future    Pulmonary air trapping  Lungs are clear follows with pulmonary medicine  Orders:  •  Albumin / creatinine urine ratio; Future  •  Comprehensive metabolic panel; Future  •  Hemoglobin A1C; Future  •  CBC and Platelet; Future  •  Lipid Panel with Direct LDL reflex; Future  •  TSH, 3rd generation with Free T4 reflex; Future    Pure hypercholesterolemia  Stable continue Crestor 5 mg daily  Orders:  •  Albumin / creatinine urine ratio; Future  •  Comprehensive metabolic panel; Future  •  Hemoglobin A1C; Future  •  CBC and Platelet; Future  •  Lipid Panel with Direct LDL reflex; Future  •  TSH, 3rd generation with Free T4 reflex; Future    Screening for prostate cancer    Orders:  •  PSA, Total Screen; Future  •  Albumin / creatinine urine ratio; Future  •  Comprehensive metabolic panel; Future  •  Hemoglobin A1C; Future  •  CBC and Platelet; Future  •  Lipid Panel with Direct LDL reflex; Future  •  TSH, 3rd generation with Free T4 reflex; Future           History of Present Illness   Patient is doing well.  Is lost 2 pounds.  Patient has sold 1 home and moved to another home and admitted that he has missed some medications.  Patient states taking his medication at the present time.  And current sugars are in the 110 range      Review of Systems   Constitutional: Negative.    HENT: Negative.     Eyes: Negative.    Respiratory: Negative.     Cardiovascular: Negative.    Gastrointestinal: Negative.    Endocrine:        HPI   Genitourinary: Negative.    Musculoskeletal: Negative.    Skin: Negative.    Allergic/Immunologic: Negative.    Neurological: Negative.    Hematological: Negative.    Psychiatric/Behavioral: Negative.         Objective   BP 98/60   Pulse 70   Resp 14   Ht 6' 1\" (1.854 m)   Wt 106 kg (233 lb)   BMI 30.74 kg/m²      Physical Exam  Vitals and nursing note reviewed.   Constitutional:       Appearance: Normal appearance.   HENT:      Head: Normocephalic and atraumatic.      Mouth/Throat:      " Mouth: Mucous membranes are moist.     Eyes:      General: No scleral icterus.    Neck:      Vascular: No carotid bruit.     Cardiovascular:      Rate and Rhythm: Normal rate and regular rhythm.      Heart sounds: Normal heart sounds.   Pulmonary:      Effort: Pulmonary effort is normal.      Breath sounds: Normal breath sounds.   Abdominal:      Palpations: Abdomen is soft.      Tenderness: There is no abdominal tenderness.     Musculoskeletal:      Cervical back: Neck supple.      Right lower leg: No edema.      Left lower leg: No edema.     Skin:     General: Skin is warm and dry.     Neurological:      General: No focal deficit present.      Mental Status: He is alert.     Psychiatric:         Mood and Affect: Mood normal.

## 2025-05-22 NOTE — ASSESSMENT & PLAN NOTE
Stable continue Crestor 5 mg daily  Orders:  •  Albumin / creatinine urine ratio; Future  •  Comprehensive metabolic panel; Future  •  Hemoglobin A1C; Future  •  CBC and Platelet; Future  •  Lipid Panel with Direct LDL reflex; Future  •  TSH, 3rd generation with Free T4 reflex; Future

## 2025-05-22 NOTE — PATIENT INSTRUCTIONS
Continue present therapy  Diet exercise weight loss recommended  Check hemoglobin A1c in 3 months  Return in 6 months for office visit, annual exam, and blood work sooner if needed

## 2025-07-20 ENCOUNTER — PATIENT MESSAGE (OUTPATIENT)
Dept: FAMILY MEDICINE CLINIC | Facility: CLINIC | Age: 50
End: 2025-07-20

## 2025-07-21 DIAGNOSIS — E11.9 TYPE 2 DIABETES MELLITUS WITHOUT COMPLICATION, WITHOUT LONG-TERM CURRENT USE OF INSULIN (HCC): ICD-10-CM

## 2025-07-21 DIAGNOSIS — E78.00 PURE HYPERCHOLESTEROLEMIA: ICD-10-CM

## 2025-07-23 RX ORDER — ROSUVASTATIN CALCIUM 5 MG/1
5 TABLET, COATED ORAL DAILY
Qty: 90 TABLET | Refills: 1 | Status: SHIPPED | OUTPATIENT
Start: 2025-07-23